# Patient Record
Sex: FEMALE | Race: WHITE | NOT HISPANIC OR LATINO | Employment: UNEMPLOYED | URBAN - METROPOLITAN AREA
[De-identification: names, ages, dates, MRNs, and addresses within clinical notes are randomized per-mention and may not be internally consistent; named-entity substitution may affect disease eponyms.]

---

## 2017-11-14 ENCOUNTER — HOSPITAL ENCOUNTER (OUTPATIENT)
Dept: RADIOLOGY | Facility: HOSPITAL | Age: 52
Discharge: HOME | End: 2017-11-14
Attending: ORTHOPAEDIC SURGERY
Payer: COMMERCIAL

## 2017-11-14 DIAGNOSIS — S83.241A OTH TEAR OF MEDIAL MENISCUS, CURRENT INJURY, R KNEE, INIT: ICD-10-CM

## 2017-11-14 PROCEDURE — 73721 MRI JNT OF LWR EXTRE W/O DYE: CPT | Mod: 26,RT,, | Performed by: RADIOLOGY

## 2017-11-14 PROCEDURE — 73721 MRI JNT OF LWR EXTRE W/O DYE: CPT | Mod: TC,RT

## 2017-12-14 ENCOUNTER — OFFICE VISIT (OUTPATIENT)
Dept: OBSTETRICS AND GYNECOLOGY | Facility: CLINIC | Age: 52
End: 2017-12-14
Payer: COMMERCIAL

## 2017-12-14 VITALS
WEIGHT: 227 LBS | HEART RATE: 88 BPM | RESPIRATION RATE: 10 BRPM | SYSTOLIC BLOOD PRESSURE: 138 MMHG | HEIGHT: 66 IN | DIASTOLIC BLOOD PRESSURE: 90 MMHG | BODY MASS INDEX: 36.48 KG/M2

## 2017-12-14 DIAGNOSIS — N95.1 VAGINAL DRYNESS, MENOPAUSAL: ICD-10-CM

## 2017-12-14 DIAGNOSIS — R23.2 VASOMOTOR FLUSHING: ICD-10-CM

## 2017-12-14 DIAGNOSIS — Z79.890 HORMONE REPLACEMENT THERAPY (HRT): ICD-10-CM

## 2017-12-14 DIAGNOSIS — R68.82 DECREASED LIBIDO: ICD-10-CM

## 2017-12-14 DIAGNOSIS — Z12.39 SCREENING FOR BREAST CANCER: ICD-10-CM

## 2017-12-14 DIAGNOSIS — Z01.419 WELL WOMAN EXAM WITH ROUTINE GYNECOLOGICAL EXAM: Primary | ICD-10-CM

## 2017-12-14 PROCEDURE — 99386 PREV VISIT NEW AGE 40-64: CPT | Mod: S$GLB,,, | Performed by: OBSTETRICS & GYNECOLOGY

## 2017-12-14 PROCEDURE — 99999 PR PBB SHADOW E&M-EST. PATIENT-LVL III: CPT | Mod: PBBFAC,,, | Performed by: OBSTETRICS & GYNECOLOGY

## 2017-12-14 RX ORDER — FUROSEMIDE 20 MG/1
40 TABLET ORAL DAILY
COMMUNITY
Start: 2017-12-12 | End: 2019-07-08 | Stop reason: DRUGHIGH

## 2017-12-14 RX ORDER — CHLORDIAZEPOXIDE HYDROCHLORIDE AND CLIDINIUM BROMIDE 5; 2.5 MG/1; MG/1
1 CAPSULE ORAL NIGHTLY
COMMUNITY
Start: 2017-11-14 | End: 2023-05-24

## 2017-12-14 RX ORDER — ESTRADIOL 0.1 MG/G
1 CREAM VAGINAL DAILY
Qty: 42.5 G | Refills: 6 | Status: SHIPPED | OUTPATIENT
Start: 2017-12-14 | End: 2019-07-08

## 2017-12-14 RX ORDER — ESZOPICLONE 2 MG/1
2 TABLET, FILM COATED ORAL NIGHTLY
COMMUNITY
End: 2020-07-28 | Stop reason: DRUGHIGH

## 2017-12-14 RX ORDER — ESTRADIOL 1 MG/1
1 TABLET ORAL DAILY
Qty: 30 TABLET | Refills: 11 | Status: SHIPPED | OUTPATIENT
Start: 2017-12-14 | End: 2018-12-14

## 2017-12-14 RX ORDER — FENOFIBRATE 160 MG/1
160 TABLET ORAL DAILY
COMMUNITY

## 2017-12-14 RX ORDER — METHOCARBAMOL 500 MG/1
TABLET, FILM COATED ORAL
COMMUNITY
Start: 2017-12-12 | End: 2019-07-08

## 2017-12-14 RX ORDER — LOSARTAN POTASSIUM 50 MG/1
TABLET ORAL
COMMUNITY
Start: 2017-12-12 | End: 2019-07-08

## 2017-12-14 RX ORDER — ESOMEPRAZOLE MAGNESIUM 40 MG/1
40 CAPSULE, DELAYED RELEASE ORAL DAILY
COMMUNITY
Start: 2017-12-12 | End: 2020-01-24

## 2017-12-14 RX ORDER — POTASSIUM CHLORIDE 750 MG/1
10 TABLET, EXTENDED RELEASE ORAL DAILY
COMMUNITY
Start: 2017-12-12

## 2017-12-14 RX ORDER — METOPROLOL TARTRATE 50 MG/1
50 TABLET ORAL 2 TIMES DAILY
COMMUNITY
Start: 2017-12-12

## 2017-12-14 NOTE — PROGRESS NOTES
Subjective:    Patient ID: Shelbie Santamaria is a 52 y.o. y.o. female.     Chief Complaint: Annual Well Woman Exam     History of Present Illness:  Shelbie presents today for Annual Well Woman exam. She describes her menses as absent.She denies pelvic pain.  She denies breast tenderness, masses, nipple discharge. She denies difficulty with urination or bowel movements. She admits to menopausal symptoms such as hotflashes, vaginal dryness, and night sweats. She denies bloating, early satiety, or weight changes. She is not currently sexually active. Contraception is by no method.    Patient had a hysterectomy in the 90s for endometriosis. She reports having vasomotory symptoms since however they are acutely worsening. She reports debilitating hot flashes, night sweats, and mood swings. Previously on HRT however caused headaches. She is interested in restarting. She also report decreased libido and vaginal dryness.     A full discussion of the benefit-risk ratio of hormonal replacement therapy was carried out. Improvement in vasomotor and other climacteric symptoms were discussed, including possible improvements in sleep and mood. Reduction of risk for osteoporosis was explained. We discussed the study data showing increased risk of thrombo-embolic events such as myocardial infarction, stroke and also possibly breast cancer with estrogen replacement, and how this might affect her. The range of side effects such as breast tenderness, weight gain and including possible increases in lifetime risk of breast cancer and possible thrombotic complications was discussed. We also discussed ACOG's recommendation to use hormone replacement therapy for the relief of hot flashes alone and to be on the lowest dose possible for the shortest amount of time.  Alternative such as herbal and soy-based products were reviewed as well as behavioral modifications and non hormonal prescription medications. All of her questions about this  therapy were answered.      Menstrual History:   No LMP recorded. Patient has had a hysterectomy..     OB History      Para Term  AB Living    1 1 1          SAB TAB Ectopic Multiple Live Births                       The following portions of the patient's history were reviewed and updated as appropriate: allergies, current medications, past family history, past medical history, past social history, past surgical history and problem list.        ROS:     Review of Systems   Constitutional: Negative for activity change, appetite change, chills, diaphoresis, fatigue, fever and unexpected weight change.   HENT: Negative for mouth sores and tinnitus.    Eyes: Negative for discharge and visual disturbance.   Respiratory: Negative for cough, shortness of breath and wheezing.    Cardiovascular: Negative for chest pain, palpitations and leg swelling.   Gastrointestinal: Negative for abdominal pain, bloating, blood in stool, constipation, diarrhea, nausea and vomiting.   Endocrine: Positive for hot flashes. Negative for diabetes, hair loss, hyperthyroidism and hypothyroidism.   Genitourinary: Positive for decreased libido and dyspareunia. Negative for dysuria, flank pain, frequency, genital sores, hematuria, menorrhagia, menstrual problem, pelvic pain, urgency, vaginal bleeding, vaginal discharge, vaginal pain, urinary incontinence, postcoital bleeding and vaginal odor.   Musculoskeletal: Negative for back pain, joint swelling and myalgias.   Skin:  Negative for rash and no acne.   Neurological: Negative for seizures, syncope, numbness and headaches.   Hematological: Negative for adenopathy. Does not bruise/bleed easily.   Psychiatric/Behavioral: Negative for depression and sleep disturbance. The patient is not nervous/anxious.    Breast: Negative for breast mass, breast pain, nipple discharge and skin changes      Objective:    Vital Signs:  Vitals:    17 1205   BP: (!) 138/90   Pulse: 88   Resp: 10  "  Weight: 103 kg (227 lb)   Height: 5' 6" (1.676 m)         Physical Exam:  General:  alert, cooperative, appears stated age   Skin:  Skin color, texture, turgor normal. No rashes or lesions   HEENT:  conjunctivae/corneas clear. PERRL.   Neck: supple, trachea midline, no adenopathy or thyromegally   Respiratory:  clear to auscultation bilaterally   Heart:  regular rate and rhythm, S1, S2 normal, no murmur, click, rub or gallop   Breasts:  no discharge, erythema, or tenderness   Abdomen:  normal findings: bowel sounds normal, no masses palpable, no organomegaly and soft, non-tender   Pelvis: External genitalia: normal general appearance  Urinary system: urethral meatus normal, bladder nontender  Vaginal: atrophic mucosa  Cervix: absent, removed surgically  Uterus: absent, removed surgically  Adnexa: non palpable   Extremities: Normal ROM; no edema, no cyanosis   Neurologial: Normal strength and tone. No focal numbness or weakness. Reflexes 2+ and equal.   Psychiatric: normal mood, speech, dress, and thought processes         Assessment:       Healthy female exam.     1. Well woman exam with routine gynecological exam    2. Screening for breast cancer    3. Hormone replacement therapy (HRT)    4. Vaginal dryness, menopausal    5. Vasomotor flushing    6. Decreased libido          Plan:       pap smear deferred    MMG up to date from June 2016  Rx of Estrace 1 mg  Rx of Estrace cream  Discussed weight bearing exercise, calcium, and vitamin d for osteoporosis prevention  Discussed OTC lubricants  RTC in 1-2 months if no improvement        COUNSELING:  Shelbie was counseled on STD pevention, use and side-effects of various contraceptive measures, A.C.O.G. Pap guidelines and recommendations for yearly pelvic exams in addition to recommendations for monthly self breast exams; to see her PCP for other health maintenance.   "

## 2019-07-08 ENCOUNTER — OFFICE VISIT (OUTPATIENT)
Dept: NEUROLOGY | Facility: CLINIC | Age: 54
End: 2019-07-08
Payer: COMMERCIAL

## 2019-07-08 VITALS
SYSTOLIC BLOOD PRESSURE: 142 MMHG | WEIGHT: 255.31 LBS | HEIGHT: 66 IN | BODY MASS INDEX: 41.03 KG/M2 | DIASTOLIC BLOOD PRESSURE: 92 MMHG | HEART RATE: 88 BPM | RESPIRATION RATE: 18 BRPM

## 2019-07-08 DIAGNOSIS — R20.0 NUMBNESS AND TINGLING OF BOTH FEET: Primary | ICD-10-CM

## 2019-07-08 DIAGNOSIS — R20.2 NUMBNESS AND TINGLING OF BOTH FEET: Primary | ICD-10-CM

## 2019-07-08 DIAGNOSIS — I73.9 CLAUDICATION: ICD-10-CM

## 2019-07-08 DIAGNOSIS — Z87.891 FORMER SMOKER: ICD-10-CM

## 2019-07-08 PROCEDURE — 99204 PR OFFICE/OUTPT VISIT, NEW, LEVL IV, 45-59 MIN: ICD-10-PCS | Mod: S$GLB,,, | Performed by: PSYCHIATRY & NEUROLOGY

## 2019-07-08 PROCEDURE — 3008F PR BODY MASS INDEX (BMI) DOCUMENTED: ICD-10-PCS | Mod: CPTII,S$GLB,, | Performed by: PSYCHIATRY & NEUROLOGY

## 2019-07-08 PROCEDURE — 3008F BODY MASS INDEX DOCD: CPT | Mod: CPTII,S$GLB,, | Performed by: PSYCHIATRY & NEUROLOGY

## 2019-07-08 PROCEDURE — 99999 PR PBB SHADOW E&M-EST. PATIENT-LVL III: ICD-10-PCS | Mod: PBBFAC,,, | Performed by: PSYCHIATRY & NEUROLOGY

## 2019-07-08 PROCEDURE — 99204 OFFICE O/P NEW MOD 45 MIN: CPT | Mod: S$GLB,,, | Performed by: PSYCHIATRY & NEUROLOGY

## 2019-07-08 PROCEDURE — 99999 PR PBB SHADOW E&M-EST. PATIENT-LVL III: CPT | Mod: PBBFAC,,, | Performed by: PSYCHIATRY & NEUROLOGY

## 2019-07-08 RX ORDER — METHOCARBAMOL 750 MG/1
750 TABLET, FILM COATED ORAL 4 TIMES DAILY
COMMUNITY
End: 2019-07-25 | Stop reason: SDUPTHER

## 2019-07-08 RX ORDER — AMOXICILLIN 500 MG
1 CAPSULE ORAL 2 TIMES DAILY
COMMUNITY

## 2019-07-08 RX ORDER — FUROSEMIDE 40 MG/1
TABLET ORAL
COMMUNITY

## 2019-07-08 RX ORDER — ACETAMINOPHEN 500 MG
5000 TABLET ORAL 2 TIMES DAILY
COMMUNITY

## 2019-07-08 RX ORDER — ASPIRIN 81 MG/1
81 TABLET ORAL DAILY
COMMUNITY

## 2019-07-08 RX ORDER — BETAMETHASONE DIPROPIONATE 0.5 MG/G
OINTMENT, AUGMENTED TOPICAL
COMMUNITY
End: 2021-10-27

## 2019-07-08 RX ORDER — CETIRIZINE HYDROCHLORIDE 10 MG/1
10 TABLET ORAL DAILY
COMMUNITY

## 2019-07-08 RX ORDER — EZETIMIBE 10 MG/1
10 TABLET ORAL NIGHTLY
Refills: 4 | COMMUNITY
Start: 2019-07-01

## 2019-07-08 NOTE — PROGRESS NOTES
Consult from SEBASTIAN Alexandre    HPI: Shelbie Santamaria is a 54 y.o. female with history of numbness in the feet and pain in the leg  The patient's symptoms started about 6 months ago. She first noted some tingling and numbness in both of her feet (mostly in the toes on the right side, but then all the way to the ankle on the right).  The tingling can occur at rest.  She also has a grabbing and squeezing pain in the left calf mostly with standing/walking.    She has tried biofreeze, different shoes and robaxin. Robaxin helps with some her muscle cramps in the left leg.   The numbness in the toes is mildly burning, but more aggravating than painful currently    She has been on Nexium for years for her GI upset found to be C diff prior. NO history of GI bleeding. No clear ulceration   She has no GERD currently  Patient is a former smoker (quit in 2018)  PCP ordered Venous US legs, BMP, B12, folate, TSH,CBC  She is being treated for lower K per her since those labs    Has been told she has Prediabetes prior by A1C per her    She has seen Dr Kong for back pain prior which later improved (no surgery recommended)  No radicular back pain, no back pain currently, and now pain above the knee    She cares for her son who is a dependent adult as well as her mom.    Review of Systems   Constitutional: Negative for fever.   HENT: Negative for nosebleeds.    Eyes: Negative for double vision.   Respiratory: Negative for hemoptysis.    Cardiovascular: Negative for leg swelling.   Genitourinary: Negative for hematuria.   Musculoskeletal: Negative for back pain.   Skin: Negative for rash.   Neurological: Positive for speech change.   Endo/Heme/Allergies: Does not bruise/bleed easily.         I have reviewed all of this patient's past medical and surgical histories as well as family and social histories and active allergies and medications as documented in the electronic medical record.        Exam:  Gen Appearance, well  developed/nourished in no apparent distress  CV: 2+ distal pulses with no edema or swelling  Neuro:  MS: Awake, alert, oriented to place, person, time, situation. Sustains attention. Recent/remote memory intact, Language is full to spontaneous speech/repetition/naming/comprehension. Fund of Knowledge is full  CN: Optic discs are flat with normal vasculature, PERRL, Extraoccular movements and visual fields are full. Normal facial sensation and strength, Hearing symmetric, Tongue and Palate are midline and strong. Shoulder Shrug symmetric and strong.  Motor: Normal bulk, tone, no abnormal movements. 5/5 strength bilateral upper/lower extremities with 1+ reflexes and bilateral plantar response  Sensory: symmetric to light touch, pain, temp, and vibration except reduced in the feet to all modalities  Romberg negative  Cerebellar: Finger-nose,Heal-shin, Rapid alternating movements intact  Gait: Normal stance, no ataxia      Assessment/Plan: Shelbie Sanatmaria is a 54 y.o. female with 6 months of numbness in the toes on the right sided and numbness in the feet to the ankle on the left side with some cramping in the left calf mostly with standing/walking  I recommend:   1. EMG/NCS of the legs  2. ABIs  3. PCP ordered Venous US legs, BMP, B12, folate, TSH,CBC- will request these results. She is being treated for lower K per her since those labs  -Has been told she has Prediabetes prior by A1C per her which could contribute to neuropathy  4. Patient is a former smoker-quit in 2018  5. Robaxin helps with some her muscle cramps in the left leg.   6. Nexium use noted for prior C. Diff infection per her. These meds have been possibly implicated in Neuropathy. D/C Nexium and use Zantac if able to see if any tingling symptoms improve.   -Will consider gabapentin trial for symptoms going forward depending on the above   RTC for EMG/NCS  CC: SEBASTIAN Alexandre

## 2019-07-08 NOTE — LETTER
July 8, 2019      Sunny Alexandre NP  [address]  [address]  [place]  [place] [address]           [facility]  [address] [facility] [address]  Phone: [phone]  Fax: [phone]          Patient: Shelbie Santamaria   MR Number: [id number]   YOB: 1965   Date of Visit: 7/8/2019       Dear Sunny Alexandre:    Thank you for referring Shelbie Santamaria to me for evaluation. Attached you will find relevant portions of my assessment and plan of care.    If you have questions, please do not hesitate to call me. I look forward to following Shelbie Santamaria along with you.    Sincerely,    Manuel Gray MD    Enclosure  CC:  No Recipients    If you would like to receive this communication electronically, please contact [email or web address] or [phone] to request more information on [facility] access.    For providers and/or their staff who would like to refer a patient to [facility], please contact us through our one-stop-shop provider referral line, [facility], at [phone].    If you feel you have received this communication in error or would no longer like to receive these types of communications, please e-mail [email or web address]

## 2019-07-10 ENCOUNTER — HOSPITAL ENCOUNTER (OUTPATIENT)
Dept: PULMONOLOGY | Facility: HOSPITAL | Age: 54
Discharge: HOME | End: 2019-07-10
Attending: PSYCHIATRY & NEUROLOGY
Payer: COMMERCIAL

## 2019-07-10 DIAGNOSIS — Z87.891 FORMER SMOKER: ICD-10-CM

## 2019-07-10 DIAGNOSIS — I73.9 CLAUDICATION: ICD-10-CM

## 2019-07-10 LAB
LEFT ABI: 1.24
LEFT ARM BP: 187 MMHG
LEFT DORSALIS PEDIS: 195 MMHG
LEFT POSTERIOR TIBIAL: 232 MMHG
LEFT TBI: 0.75
LEFT TOE PRESSURE: 140 MMHG
RIGHT ABI: 1.18
RIGHT ARM BP: 180 MMHG
RIGHT DORSALIS PEDIS: 220 MMHG
RIGHT POSTERIOR TIBIAL: 205 MMHG
RIGHT TBI: 1.07
RIGHT TOE PRESSURE: 200 MMHG

## 2019-07-10 PROCEDURE — 93922 UPR/L XTREMITY ART 2 LEVELS: CPT | Mod: 50

## 2019-07-10 PROCEDURE — 93922 UPR/L XTREMITY ART 2 LEVELS: CPT | Mod: 26,,, | Performed by: INTERNAL MEDICINE

## 2019-07-10 PROCEDURE — 93922 CV US ANKLE BRACHIAL INDICES WO STRESS W TOE TRACINGS (CUPID ONLY): ICD-10-PCS | Mod: 26,,, | Performed by: INTERNAL MEDICINE

## 2019-07-25 ENCOUNTER — PROCEDURE VISIT (OUTPATIENT)
Dept: NEUROLOGY | Facility: CLINIC | Age: 54
End: 2019-07-25
Payer: COMMERCIAL

## 2019-07-25 DIAGNOSIS — Z87.891 FORMER SMOKER: ICD-10-CM

## 2019-07-25 DIAGNOSIS — G62.9 POLYNEUROPATHY: Primary | ICD-10-CM

## 2019-07-25 DIAGNOSIS — R20.0 NUMBNESS AND TINGLING OF BOTH FEET: ICD-10-CM

## 2019-07-25 DIAGNOSIS — R20.2 NUMBNESS AND TINGLING OF BOTH FEET: ICD-10-CM

## 2019-07-25 PROCEDURE — 95910 NRV CNDJ TEST 7-8 STUDIES: CPT | Mod: S$GLB,,, | Performed by: PSYCHIATRY & NEUROLOGY

## 2019-07-25 PROCEDURE — 95886 MUSC TEST DONE W/N TEST COMP: CPT | Mod: S$GLB,,, | Performed by: PSYCHIATRY & NEUROLOGY

## 2019-07-25 PROCEDURE — 95910 PR NERVE CONDUCTION STUDY; 7-8 STUDIES: ICD-10-PCS | Mod: S$GLB,,, | Performed by: PSYCHIATRY & NEUROLOGY

## 2019-07-25 PROCEDURE — 95886 PR EMG COMPLETE, W/ NERVE CONDUCTION STUDIES, 5+ MUSCLES: ICD-10-PCS | Mod: S$GLB,,, | Performed by: PSYCHIATRY & NEUROLOGY

## 2019-07-25 RX ORDER — METHOCARBAMOL 750 MG/1
750 TABLET, FILM COATED ORAL 3 TIMES DAILY
Qty: 90 TABLET | Refills: 5 | Status: SHIPPED | OUTPATIENT
Start: 2019-07-25 | End: 2020-01-24 | Stop reason: SDUPTHER

## 2019-07-25 NOTE — PROCEDURES
EMG W/ ULTRASOUND AND NERVE CONDUCTION TEST 2 Extremities  Date/Time: 7/25/2019 3:18 PM  Performed by: Manuel Gray MD  Authorized by: Manuel Gray MD       REPORT OF EMG and NERVE CONDUCTION STUDY    Name: Shelbie Santamaria  Date of Study:  7/25/19  Referring Physician:  Marina  Test Performed by:  MD Marina  Full Values Attached  Informed Consent Scanned.   No anesthesia used.   Amount of Blood Loss: none. The patient tolerated this procedure well.       Informed consent was obtained prior to performing this study. Two patient identifiers were confirmed with the patient prior to performing this study. A time out to determine correct patient and and agreement on procedure performed was conducted prior to the concentric needle examination.    Reason for the study:  Numb feet      Findings:   Nerve conduction studies of the  bilateral peroneal motor, bilateral tibial motor, bilateral sural nerves and bilateral H-reflexes were performed.   Amplitudes were mildly reduced in some motor CMAPs and both sural snaps, butdistal latencies, conduction velocities, and F-waves were normal. H reflexes were absent  EMG of selected muscles of the bilateral legs were performed as indicated on the attached sheets.  Insertional activity was normal without fasciculation or fibrillation, normal sized and phasia of motor units.      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 [facility].     Recommendations (discussed at this visit):   1. Her symptoms are mildly improved with stopping Nexium to date and she will continue off of this med.   2. ABIs unremarkable and she states Venous US with PCP was negative (that result was never sent)  3. Labs from PCP including B12, Folate, TSH, CBC were all reviewed. Fasting glucose normal at times and 126 at others. This is the likely cause of her mild  neuropathy. Discussed diet with goal 10 pound weight loss in 6 months (lower carb). Repeat A1C at that time and will add SPEP/JESSE if symptoms are not resolved.   4. She declines gabapentin trial as she mostly has numbness since Robaxin has helped her leg cramping. Will continue her Robaxin.   RTC 6 months

## 2020-01-24 ENCOUNTER — OFFICE VISIT (OUTPATIENT)
Dept: NEUROLOGY | Facility: CLINIC | Age: 55
End: 2020-01-24
Payer: COMMERCIAL

## 2020-01-24 VITALS
SYSTOLIC BLOOD PRESSURE: 130 MMHG | DIASTOLIC BLOOD PRESSURE: 92 MMHG | HEART RATE: 73 BPM | WEIGHT: 251.31 LBS | HEIGHT: 66 IN | RESPIRATION RATE: 20 BRPM | BODY MASS INDEX: 40.39 KG/M2

## 2020-01-24 DIAGNOSIS — R25.2 LEG CRAMPING: ICD-10-CM

## 2020-01-24 DIAGNOSIS — R73.03 PREDIABETES: ICD-10-CM

## 2020-01-24 DIAGNOSIS — E78.5 HYPERLIPIDEMIA, UNSPECIFIED HYPERLIPIDEMIA TYPE: ICD-10-CM

## 2020-01-24 DIAGNOSIS — G62.9 POLYNEUROPATHY: Primary | ICD-10-CM

## 2020-01-24 DIAGNOSIS — I10 HYPERTENSION, UNSPECIFIED TYPE: ICD-10-CM

## 2020-01-24 PROCEDURE — 3080F PR MOST RECENT DIASTOLIC BLOOD PRESSURE >= 90 MM HG: ICD-10-PCS | Mod: CPTII,S$GLB,, | Performed by: NURSE PRACTITIONER

## 2020-01-24 PROCEDURE — 3008F PR BODY MASS INDEX (BMI) DOCUMENTED: ICD-10-PCS | Mod: CPTII,S$GLB,, | Performed by: NURSE PRACTITIONER

## 2020-01-24 PROCEDURE — 99214 OFFICE O/P EST MOD 30 MIN: CPT | Mod: S$GLB,,, | Performed by: NURSE PRACTITIONER

## 2020-01-24 PROCEDURE — 3008F BODY MASS INDEX DOCD: CPT | Mod: CPTII,S$GLB,, | Performed by: NURSE PRACTITIONER

## 2020-01-24 PROCEDURE — 3075F SYST BP GE 130 - 139MM HG: CPT | Mod: CPTII,S$GLB,, | Performed by: NURSE PRACTITIONER

## 2020-01-24 PROCEDURE — 3080F DIAST BP >= 90 MM HG: CPT | Mod: CPTII,S$GLB,, | Performed by: NURSE PRACTITIONER

## 2020-01-24 PROCEDURE — 99214 PR OFFICE/OUTPT VISIT, EST, LEVL IV, 30-39 MIN: ICD-10-PCS | Mod: S$GLB,,, | Performed by: NURSE PRACTITIONER

## 2020-01-24 PROCEDURE — 99999 PR PBB SHADOW E&M-EST. PATIENT-LVL IV: CPT | Mod: PBBFAC,,, | Performed by: NURSE PRACTITIONER

## 2020-01-24 PROCEDURE — 3075F PR MOST RECENT SYSTOLIC BLOOD PRESS GE 130-139MM HG: ICD-10-PCS | Mod: CPTII,S$GLB,, | Performed by: NURSE PRACTITIONER

## 2020-01-24 PROCEDURE — 99999 PR PBB SHADOW E&M-EST. PATIENT-LVL IV: ICD-10-PCS | Mod: PBBFAC,,, | Performed by: NURSE PRACTITIONER

## 2020-01-24 RX ORDER — METHOCARBAMOL 750 MG/1
750 TABLET, FILM COATED ORAL 3 TIMES DAILY
Qty: 90 TABLET | Refills: 5 | Status: SHIPPED | OUTPATIENT
Start: 2020-01-24 | End: 2020-07-28 | Stop reason: SDUPTHER

## 2020-01-24 NOTE — PROGRESS NOTES
HPI: Shelbie Santamaria is a 55 y.o. female with history of numbness in the feet and pain in the leg with polyneuropathy noted on EMG BLE 2019. History of borderline DM, as well as HTN, HLD, and insomnia.     She presents today for a follow up visit. Her leg cramping continues to be helped with prn Robaxin.     She continues with pain and numbness to her feet. This is tolerable, and she continues to decline a trial of Gabapentin. She tripped over something on the floor and fell once with some increased soreness to her leg, which has resolved.     She has a headache today. This occurs intermittently from stress, weather changes, odors, smoke exposure. Long history of migraine.     She cares for her son who is a dependent adult as well as her mom.    She has a rash to her hands, which occurs with water exposure. She has seen Dermatology and has taken Prednisone intermittently, which is helpful. She is asking if I can prescribe her something for this.     Review of Systems   Constitutional: Negative for fever.   HENT: Negative for nosebleeds.    Eyes: Negative for double vision.   Respiratory: Negative for hemoptysis.    Cardiovascular: Negative for leg swelling.   Genitourinary: Negative for hematuria.   Musculoskeletal: Negative for back pain.   Skin: Positive for rash.   Neurological: Positive for speech change and headaches.   Endo/Heme/Allergies: Does not bruise/bleed easily.       I have reviewed all of this patient's past medical and surgical histories as well as family and social histories and active allergies and medications as documented in the electronic medical record.    Exam:  Gen Appearance, well developed/nourished in no apparent distress  CV: 2+ distal pulses with no edema or swelling  Neuro:  MS: Awake, alert, oriented to place, person, time, situation. Sustains attention. Recent/remote memory intact, Language is full to spontaneous speech/repetition/naming/comprehension. Fund of Knowledge is  full  CN: Optic discs are flat with normal vasculature, PERRL, Extraoccular movements and visual fields are full. Normal facial sensation and strength, Hearing symmetric, Tongue and Palate are midline and strong. Shoulder Shrug symmetric and strong.  Motor: Normal bulk, tone, no abnormal movements. 5/5 strength bilateral upper/lower extremities with 1+ reflexes and bilateral plantar response  Sensory: symmetric to light touch, pain, temp, and vibration except reduced in the feet to all modalities  Romberg negative  Cerebellar: Finger-nose,Heal-shin, Rapid alternating movements intact  Gait: Normal stance, no ataxia  Skin: severe dermatitis noted to hands bilaterally    Assessment/Plan: Shelbie Santamaria is a 55 y.o. female with history of numbness in the feet and pain in the leg with polyneuropathy noted on EMG BLE 2019. History of borderline DM, as well as HTN, HLD, and insomnia.     1. Her symptoms are mildly improved with stopping Nexium to date and she will continue off of this med. Nexium use noted for prior C. Diff infection per her. These meds have been possibly implicated in Neuropathy.   2. ABIs unremarkable and she states Venous US with PCP was negative (that result was never sent)  3. Labs from PCP including B12, Folate, TSH, CBC were all reviewed. Fasting glucose normal at times and 126 at others. This is the likely cause of her mild neuropathy. Discussed diet with goal 10 pound weight loss in 6 months (lower carb). Repeat A1C at that time and will add SPEP/JESSE if symptoms are not resolved.   4. She declines gabapentin trial as she mostly has numbness since Robaxin has helped her leg cramping. Will continue her Robaxin.   5. Patient is a former smoker-quit in 2018  6. Robaxin helps with some her muscle cramps in the left leg. Continue this medication.   7. Episodic headache noted. No preventative treatment needed.   8. Former tobacco use.   9. Advised to see Dermatology for her chronic severe  bilateral hand dermatitis.     FU 6 months

## 2020-07-28 ENCOUNTER — OFFICE VISIT (OUTPATIENT)
Dept: NEUROLOGY | Facility: CLINIC | Age: 55
End: 2020-07-28
Payer: COMMERCIAL

## 2020-07-28 VITALS
SYSTOLIC BLOOD PRESSURE: 146 MMHG | HEIGHT: 66 IN | DIASTOLIC BLOOD PRESSURE: 82 MMHG | WEIGHT: 235.69 LBS | RESPIRATION RATE: 16 BRPM | HEART RATE: 76 BPM | TEMPERATURE: 98 F | BODY MASS INDEX: 37.88 KG/M2

## 2020-07-28 DIAGNOSIS — G62.9 POLYNEUROPATHY: Primary | ICD-10-CM

## 2020-07-28 DIAGNOSIS — R73.03 PREDIABETES: ICD-10-CM

## 2020-07-28 DIAGNOSIS — R25.2 LEG CRAMPING: ICD-10-CM

## 2020-07-28 DIAGNOSIS — I10 HYPERTENSION, UNSPECIFIED TYPE: ICD-10-CM

## 2020-07-28 DIAGNOSIS — E78.5 HYPERLIPIDEMIA, UNSPECIFIED HYPERLIPIDEMIA TYPE: ICD-10-CM

## 2020-07-28 PROCEDURE — 3079F PR MOST RECENT DIASTOLIC BLOOD PRESSURE 80-89 MM HG: ICD-10-PCS | Mod: CPTII,S$GLB,, | Performed by: NURSE PRACTITIONER

## 2020-07-28 PROCEDURE — 99999 PR PBB SHADOW E&M-EST. PATIENT-LVL IV: CPT | Mod: PBBFAC,,, | Performed by: NURSE PRACTITIONER

## 2020-07-28 PROCEDURE — 3079F DIAST BP 80-89 MM HG: CPT | Mod: CPTII,S$GLB,, | Performed by: NURSE PRACTITIONER

## 2020-07-28 PROCEDURE — 99999 PR PBB SHADOW E&M-EST. PATIENT-LVL IV: ICD-10-PCS | Mod: PBBFAC,,, | Performed by: NURSE PRACTITIONER

## 2020-07-28 PROCEDURE — 99214 OFFICE O/P EST MOD 30 MIN: CPT | Mod: S$GLB,,, | Performed by: NURSE PRACTITIONER

## 2020-07-28 PROCEDURE — 3008F BODY MASS INDEX DOCD: CPT | Mod: CPTII,S$GLB,, | Performed by: NURSE PRACTITIONER

## 2020-07-28 PROCEDURE — 99214 PR OFFICE/OUTPT VISIT, EST, LEVL IV, 30-39 MIN: ICD-10-PCS | Mod: S$GLB,,, | Performed by: NURSE PRACTITIONER

## 2020-07-28 PROCEDURE — 3008F PR BODY MASS INDEX (BMI) DOCUMENTED: ICD-10-PCS | Mod: CPTII,S$GLB,, | Performed by: NURSE PRACTITIONER

## 2020-07-28 PROCEDURE — 3077F PR MOST RECENT SYSTOLIC BLOOD PRESSURE >= 140 MM HG: ICD-10-PCS | Mod: CPTII,S$GLB,, | Performed by: NURSE PRACTITIONER

## 2020-07-28 PROCEDURE — 3077F SYST BP >= 140 MM HG: CPT | Mod: CPTII,S$GLB,, | Performed by: NURSE PRACTITIONER

## 2020-07-28 RX ORDER — ESZOPICLONE 3 MG/1
3 TABLET, FILM COATED ORAL NIGHTLY
COMMUNITY
End: 2023-05-24

## 2020-07-28 RX ORDER — GABAPENTIN 100 MG/1
100 CAPSULE ORAL NIGHTLY
Qty: 90 CAPSULE | Refills: 1 | Status: SHIPPED | OUTPATIENT
Start: 2020-07-28 | End: 2020-09-29 | Stop reason: ALTCHOICE

## 2020-07-28 RX ORDER — METHOCARBAMOL 750 MG/1
750 TABLET, FILM COATED ORAL 3 TIMES DAILY
Qty: 270 TABLET | Refills: 1 | Status: SHIPPED | OUTPATIENT
Start: 2020-07-28 | End: 2020-12-23 | Stop reason: SDUPTHER

## 2020-07-28 NOTE — PATIENT INSTRUCTIONS
[email or web address]    Make an account and you can send me a message on the portal. Update me in 2 weeks on response to Gabapentin at night.

## 2020-07-28 NOTE — PROGRESS NOTES
HPI: Shelbie Santamaria is a 55 y.o. female with history of numbness in the feet and pain in the leg with polyneuropathy noted on EMG BLE 2019. History of borderline DM, as well as HTN, HLD, and insomnia.     She presents today for a follow up visit. Her mother passed away recently from pneumonia.     Her leg cramping continues to be helped with prn Robaxin.     She continues with pain and numbness to her feet. She would like to try Gabapentin at night, when her symptoms are bothersome.     She lost 16 pounds since her last visit.     Headaches occur intermittently from stress, weather changes, odors, smoke exposure. Long history of migraine. She has a mentally handicapped son, who has seizures.     She sees Dermatology for dyshidrotic eczema to her hands, which is improved since her last visit.      Review of Systems   Constitutional: Negative for fever.   HENT: Negative for nosebleeds.    Eyes: Negative for double vision.   Respiratory: Negative for hemoptysis.    Cardiovascular: Negative for leg swelling.   Genitourinary: Negative for hematuria.   Musculoskeletal: Negative for back pain.   Skin: Positive for rash.   Neurological: Positive for speech change and headaches.   Endo/Heme/Allergies: Does not bruise/bleed easily.       I have reviewed all of this patient's past medical and surgical histories as well as family and social histories and active allergies and medications as documented in the electronic medical record.    Exam:  Gen Appearance, well developed/nourished in no apparent distress  CV: 2+ distal pulses with no edema or swelling  Neuro:  MS: Awake, alert, oriented to place, person, time, situation. Sustains attention. Recent/remote memory intact, Language is full to spontaneous speech/repetition/naming/comprehension. Fund of Knowledge is full  CN: Optic discs are flat with normal vasculature, PERRL, Extraoccular movements and visual fields are full. Normal facial sensation and strength,  Hearing symmetric, Tongue and Palate are midline and strong. Shoulder Shrug symmetric and strong.  Motor: Normal bulk, tone, no abnormal movements. 5/5 strength bilateral upper/lower extremities with 1+ reflexes and bilateral plantar response  Sensory: symmetric to light touch, pain, temp, and vibration except reduced in the feet to all modalities  Romberg negative  Cerebellar: Finger-nose,Heal-shin, Rapid alternating movements intact  Gait: Normal stance, no ataxia  Skin: severe dermatitis noted to hands bilaterally    Assessment/Plan: Shelbie Santamaria is a 55 y.o. female with history of numbness in the feet and pain in the leg with polyneuropathy noted on EMG BLE 2019. History of borderline DM, as well as HTN, HLD, and insomnia.     1. Her symptoms are mildly improved with stopping Nexium to date and she will continue off of this med. Nexium use noted for prior C. Diff infection per her. These meds have been possibly implicated in Neuropathy.   2. ABIs unremarkable and she states Venous US with PCP was negative (that result was never sent)  3. Labs from PCP including B12, Folate, TSH, CBC were all reviewed. Fasting glucose normal at times and 126 at others. This is the likely cause of her mild neuropathy. Discussed diet with goal 10 pound weight loss in 6 months (lower carb). Repeat A1C at that time and will add SPEP/JESSE if symptoms are not resolved.   4. Start Gabapentin trial 100 mg qhs for neuropathic complaints.  This may help her to rest to where she no longer requires Lunesta.   -Robaxin has helped her leg cramping. Will continue her Robaxin.   5. Patient is a former smoker-quit in 2018  6. Robaxin helps with some her muscle cramps in the left leg. Continue this medication.   7. Episodic headache noted. No preventative treatment needed.   8. Former tobacco use.   9. Advised to see Dermatology for her chronic severe bilateral hand dermatitis.     FU 6 months

## 2020-09-29 ENCOUNTER — OFFICE VISIT (OUTPATIENT)
Dept: NEUROLOGY | Facility: CLINIC | Age: 55
End: 2020-09-29
Payer: COMMERCIAL

## 2020-09-29 VITALS
HEART RATE: 68 BPM | TEMPERATURE: 97 F | DIASTOLIC BLOOD PRESSURE: 86 MMHG | WEIGHT: 234.56 LBS | BODY MASS INDEX: 37.69 KG/M2 | RESPIRATION RATE: 18 BRPM | HEIGHT: 66 IN | SYSTOLIC BLOOD PRESSURE: 148 MMHG

## 2020-09-29 DIAGNOSIS — G62.9 POLYNEUROPATHY: Primary | ICD-10-CM

## 2020-09-29 DIAGNOSIS — R73.03 PREDIABETES: ICD-10-CM

## 2020-09-29 PROCEDURE — 3008F PR BODY MASS INDEX (BMI) DOCUMENTED: ICD-10-PCS | Mod: CPTII,S$GLB,, | Performed by: NURSE PRACTITIONER

## 2020-09-29 PROCEDURE — 99214 PR OFFICE/OUTPT VISIT, EST, LEVL IV, 30-39 MIN: ICD-10-PCS | Mod: S$GLB,,, | Performed by: NURSE PRACTITIONER

## 2020-09-29 PROCEDURE — 99999 PR PBB SHADOW E&M-EST. PATIENT-LVL IV: ICD-10-PCS | Mod: PBBFAC,,, | Performed by: NURSE PRACTITIONER

## 2020-09-29 PROCEDURE — 3008F BODY MASS INDEX DOCD: CPT | Mod: CPTII,S$GLB,, | Performed by: NURSE PRACTITIONER

## 2020-09-29 PROCEDURE — 3077F SYST BP >= 140 MM HG: CPT | Mod: CPTII,S$GLB,, | Performed by: NURSE PRACTITIONER

## 2020-09-29 PROCEDURE — 3079F PR MOST RECENT DIASTOLIC BLOOD PRESSURE 80-89 MM HG: ICD-10-PCS | Mod: CPTII,S$GLB,, | Performed by: NURSE PRACTITIONER

## 2020-09-29 PROCEDURE — 3079F DIAST BP 80-89 MM HG: CPT | Mod: CPTII,S$GLB,, | Performed by: NURSE PRACTITIONER

## 2020-09-29 PROCEDURE — 99214 OFFICE O/P EST MOD 30 MIN: CPT | Mod: S$GLB,,, | Performed by: NURSE PRACTITIONER

## 2020-09-29 PROCEDURE — 99999 PR PBB SHADOW E&M-EST. PATIENT-LVL IV: CPT | Mod: PBBFAC,,, | Performed by: NURSE PRACTITIONER

## 2020-09-29 PROCEDURE — 3077F PR MOST RECENT SYSTOLIC BLOOD PRESSURE >= 140 MM HG: ICD-10-PCS | Mod: CPTII,S$GLB,, | Performed by: NURSE PRACTITIONER

## 2020-09-29 RX ORDER — DULOXETIN HYDROCHLORIDE 30 MG/1
30 CAPSULE, DELAYED RELEASE ORAL DAILY
Qty: 30 CAPSULE | Refills: 3 | Status: SHIPPED | OUTPATIENT
Start: 2020-09-29 | End: 2020-12-23 | Stop reason: SDUPTHER

## 2020-09-29 NOTE — PATIENT INSTRUCTIONS
If prescription pain cream is not covered or is too expensive, you may try Blue Emu with Lidocaine for your foot pain.

## 2020-09-29 NOTE — PROGRESS NOTES
HPI: Shelbie Santamaria is a 55 y.o. female with history of numbness in the feet and pain in the leg with polyneuropathy noted on EMG BLE 2019. History of borderline DM, as well as HTN, HLD, and insomnia.     She presents today for a follow up visit. Sge was placed on Gabapentin qhs for neuropathic complaints, which initially caused some sadness, then this resolved. This helped the pain, but not her tingling or numbness.     She doesn't tolerate antidepressants well. She took Prozac or Elavil prior, and felt like a zombie. She took Paxil prior for headaches, which was ineffective. She had weight gain with Effexor prior.     She has been active cleaning out her mother's belongings, after she passed away.       Her leg cramping continues to be helped with prn Robaxin.     She lost 1 pound since her last visit.     Headaches occur intermittently from stress, weather changes, odors, smoke exposure. Long history of migraine. She has a mentally handicapped son, who has seizures.     She sees Dermatology for dyshidrotic eczema to her hands, which is improved since her last visit.      Review of Systems   Constitutional: Negative for fever.   HENT: Negative for nosebleeds.    Eyes: Negative for double vision.   Respiratory: Negative for hemoptysis.    Cardiovascular: Negative for leg swelling.   Genitourinary: Negative for hematuria.   Musculoskeletal: Negative for back pain.   Skin: Positive for rash.   Neurological: Positive for speech change and headaches.   Endo/Heme/Allergies: Does not bruise/bleed easily.       I have reviewed all of this patient's past medical and surgical histories as well as family and social histories and active allergies and medications as documented in the electronic medical record.    Exam:  Gen Appearance, well developed/nourished in no apparent distress  CV: 2+ distal pulses with no edema or swelling  Neuro:  MS: Awake, alert, oriented to place, person, time, situation. Sustains  attention. Recent/remote memory intact, Language is full to spontaneous speech/repetition/naming/comprehension. Fund of Knowledge is full  CN: Optic discs are flat with normal vasculature, PERRL, Extraoccular movements and visual fields are full. Normal facial sensation and strength, Hearing symmetric, Tongue and Palate are midline and strong. Shoulder Shrug symmetric and strong.  Motor: Normal bulk, tone, no abnormal movements. 5/5 strength bilateral upper/lower extremities with 1+ reflexes and bilateral plantar response  Sensory: symmetric to light touch, pain, temp, and vibration except reduced in the feet to all modalities  Romberg negative  Cerebellar: Finger-nose,Heal-shin, Rapid alternating movements intact  Gait: Normal stance, no ataxia  Skin: severe dermatitis noted to hands bilaterally    Assessment/Plan: Shelbie Santamaria is a 55 y.o. female with history of numbness in the feet and pain in the leg with polyneuropathy noted on EMG BLE 2019. History of borderline DM, as well as HTN, HLD, and insomnia.     I recommend;   1. Stop Gabapentin, as this provided mild relief, but resulted in depression for a time-would not increase further.   2. Start Cymbalta for neuropathy. Monitor for mood changes/side effects.   3. Start Rx compound cream for foot pain.   -could consider Lyrica, Topamax, Alpha Lipoic acid, or L-Glutamine for neuropathy should she fail Cymbalta or not tolerate this.   4. Consider repeat HgA1c and SPEP/JESSE should her complaints not be improved at next visit.   5. Her neuropathic symptoms are mildly improved with stopping Nexium to date and she will continue off of this med. Nexium use noted for prior C. Diff infection per her. These meds have been possibly implicated in Neuropathy.   6. ABIs unremarkable and she states Venous US with PCP was negative (that result was never sent)  7. Labs from PCP including B12, Folate, TSH, CBC were all reviewed. Fasting glucose normal at times and 126 at  others. This is the likely cause of her mild neuropathy. Discussed diet with goal 10 pound weight loss in 6 months (lower carb). Repeat A1C at that time and will add SPEP/JESSE if symptoms are not resolved.   8. Patient is a former smoker-quit in 2018  9. Robaxin helps with some her muscle cramps in the left leg. Continue this medication.   10. Episodic headache noted. No preventative treatment needed.   11. Former tobacco use.   12. Advised to see Dermatology for her chronic severe bilateral hand dermatitis.     FU 8 weeks

## 2020-12-23 ENCOUNTER — OFFICE VISIT (OUTPATIENT)
Dept: NEUROLOGY | Facility: CLINIC | Age: 55
End: 2020-12-23
Payer: COMMERCIAL

## 2020-12-23 VITALS
SYSTOLIC BLOOD PRESSURE: 136 MMHG | HEIGHT: 66 IN | DIASTOLIC BLOOD PRESSURE: 88 MMHG | WEIGHT: 233.56 LBS | BODY MASS INDEX: 37.54 KG/M2 | RESPIRATION RATE: 18 BRPM | TEMPERATURE: 97 F | HEART RATE: 72 BPM

## 2020-12-23 DIAGNOSIS — G62.9 POLYNEUROPATHY: Primary | ICD-10-CM

## 2020-12-23 DIAGNOSIS — R25.2 LEG CRAMPING: ICD-10-CM

## 2020-12-23 PROCEDURE — 3008F BODY MASS INDEX DOCD: CPT | Mod: CPTII,S$GLB,, | Performed by: NURSE PRACTITIONER

## 2020-12-23 PROCEDURE — 1125F AMNT PAIN NOTED PAIN PRSNT: CPT | Mod: S$GLB,,, | Performed by: NURSE PRACTITIONER

## 2020-12-23 PROCEDURE — 99214 PR OFFICE/OUTPT VISIT, EST, LEVL IV, 30-39 MIN: ICD-10-PCS | Mod: S$GLB,,, | Performed by: NURSE PRACTITIONER

## 2020-12-23 PROCEDURE — 3075F SYST BP GE 130 - 139MM HG: CPT | Mod: CPTII,S$GLB,, | Performed by: NURSE PRACTITIONER

## 2020-12-23 PROCEDURE — 99999 PR PBB SHADOW E&M-EST. PATIENT-LVL IV: ICD-10-PCS | Mod: PBBFAC,,, | Performed by: NURSE PRACTITIONER

## 2020-12-23 PROCEDURE — 3075F PR MOST RECENT SYSTOLIC BLOOD PRESS GE 130-139MM HG: ICD-10-PCS | Mod: CPTII,S$GLB,, | Performed by: NURSE PRACTITIONER

## 2020-12-23 PROCEDURE — 99214 OFFICE O/P EST MOD 30 MIN: CPT | Mod: S$GLB,,, | Performed by: NURSE PRACTITIONER

## 2020-12-23 PROCEDURE — 3079F PR MOST RECENT DIASTOLIC BLOOD PRESSURE 80-89 MM HG: ICD-10-PCS | Mod: CPTII,S$GLB,, | Performed by: NURSE PRACTITIONER

## 2020-12-23 PROCEDURE — 3008F PR BODY MASS INDEX (BMI) DOCUMENTED: ICD-10-PCS | Mod: CPTII,S$GLB,, | Performed by: NURSE PRACTITIONER

## 2020-12-23 PROCEDURE — 99999 PR PBB SHADOW E&M-EST. PATIENT-LVL IV: CPT | Mod: PBBFAC,,, | Performed by: NURSE PRACTITIONER

## 2020-12-23 PROCEDURE — 1125F PR PAIN SEVERITY QUANTIFIED, PAIN PRESENT: ICD-10-PCS | Mod: S$GLB,,, | Performed by: NURSE PRACTITIONER

## 2020-12-23 PROCEDURE — 3079F DIAST BP 80-89 MM HG: CPT | Mod: CPTII,S$GLB,, | Performed by: NURSE PRACTITIONER

## 2020-12-23 RX ORDER — DULOXETIN HYDROCHLORIDE 30 MG/1
30 CAPSULE, DELAYED RELEASE ORAL 2 TIMES DAILY
Qty: 60 CAPSULE | Refills: 3 | Status: SHIPPED | OUTPATIENT
Start: 2020-12-23 | End: 2021-04-29 | Stop reason: SDUPTHER

## 2020-12-23 RX ORDER — KETOCONAZOLE 20 MG/G
1 CREAM TOPICAL DAILY PRN
COMMUNITY
Start: 2020-11-10

## 2020-12-23 RX ORDER — FAMOTIDINE 10 MG/1
10 TABLET ORAL 2 TIMES DAILY
COMMUNITY
End: 2022-06-14

## 2020-12-23 RX ORDER — METHOCARBAMOL 750 MG/1
750 TABLET, FILM COATED ORAL 3 TIMES DAILY
Qty: 270 TABLET | Refills: 1 | Status: SHIPPED | OUTPATIENT
Start: 2020-12-23 | End: 2021-04-29 | Stop reason: SDUPTHER

## 2020-12-23 RX ORDER — TRIAMCINOLONE ACETONIDE 1 MG/G
CREAM TOPICAL
COMMUNITY
Start: 2020-11-10 | End: 2023-09-05

## 2020-12-23 NOTE — PROGRESS NOTES
HPI: Shelbie Santamaria is a 55 y.o. female with history of numbness in the feet and pain in the leg with polyneuropathy noted on EMG BLE 2019. History of borderline DM, as well as HTN, HLD, and insomnia.     She presents today for a follow up visit. At her last visit Gabapentin was stopped, as this was not overall effective for her neuropathic complaints. Cymbalta was started for neuropathy pain, which was very effective. She continues with some neuropathic complaints, and she would like to increase her Cymbalta further. She is tolerating this well. No fatigue.     Compound cream Rx given at last visit aggravated her hand eczema. She was unable to use.     Cymbalta has had a positive effect on her mood. Stress eating has resolved since starting this.     She started Pepcid for GERD. Nexium caused worsening foot pain prior.     Her mother passed away earlier this year.     Her leg cramping continues to be helped with prn Robaxin.     No weight gain since starting Cymbalta. She lost 1 pound.     Episodic headaches with weather changes, odors, smoke exposures, and stress, but this is infrequent.     Long history of migraine. She has a mentally handicapped son, who has seizures.     She sees Dermatology for dyshidrotic eczema to her hands, which is improved currently.     Review of Systems   Constitutional: Negative for fever.   HENT: Negative for nosebleeds.    Eyes: Negative for double vision.   Respiratory: Negative for hemoptysis.    Cardiovascular: Negative for leg swelling.   Genitourinary: Negative for hematuria.   Musculoskeletal: Negative for back pain.   Skin: Negative for rash.   Neurological: Positive for sensory change and headaches. Negative for speech change.   Endo/Heme/Allergies: Does not bruise/bleed easily.       I have reviewed all of this patient's past medical and surgical histories as well as family and social histories and active allergies and medications as documented in the electronic  medical record.    Exam:  Gen Appearance, well developed/nourished in no apparent distress  CV: 2+ distal pulses with no edema or swelling  Neuro:  MS: Awake, alert, oriented to place, person, time, situation. Sustains attention. Recent/remote memory intact, Language is full to spontaneous speech/repetition/naming/comprehension. Fund of Knowledge is full  CN: Optic discs are flat with normal vasculature, PERRL, Extraoccular movements and visual fields are full. Normal facial sensation and strength, Hearing symmetric, Tongue and Palate are midline and strong. Shoulder Shrug symmetric and strong.  Motor: Normal bulk, tone, no abnormal movements. 5/5 strength bilateral upper/lower extremities with 1+ reflexes and bilateral plantar response  Sensory: symmetric to light touch, pain, temp, and vibration except reduced in the feet to all modalities  Romberg negative  Cerebellar: Finger-nose,Heal-shin, Rapid alternating movements intact  Gait: Normal stance, no ataxia  Skin: eczema noted to hands    Assessment/Plan: Shelbie Santamaria is a 55 y.o. female with history of numbness in the feet and pain in the leg with polyneuropathy noted on EMG BLE 2019. History of borderline DM, as well as HTN, HLD, and insomnia.     I recommend;   1. Increase Cymbalta for neuropathy. This has been very effective for neuropathic complaints, and has helped her mood as well.   -she had somnolence with multiple antidepressants prior, but tolerates Cymbalta well.   -Leg cramping no worse on Cymbalta, which can aggravate RLS type complaints.   -Gabapentin provided mild relief, but resulted in depression for a time-would not increase further.   2. Rx compound cream aggravated her hand eczema.   3. Her neuropathic symptoms are mildly improved with stopping Nexium to date and she will continue off of this med. Nexium use noted for prior C. Diff infection per her. These meds have been possibly implicated in Neuropathy.   4. ABIs unremarkable and  she states Venous US with PCP was negative (that result was never sent)  5. Labs from PCP including B12, Folate, TSH, CBC were all reviewed. Fasting glucose normal at times and 126 at others. This is the likely cause of her mild neuropathy. Discussed diet with goal 10 pound weight loss in 6 months (lower carb). Repeat A1C at that time and will add SPEP/JESSE if symptoms are not resolved.   6. Patient is a former smoker-quit in 2018.  7. Robaxin helps with some her muscle cramps in the left leg. Continue this medication.   8. Episodic headache noted. No preventative treatment needed.   9. Former tobacco use.   10. She sees Dermatology for her chronic severe bilateral hand dermatitis.     FU 8 weeks

## 2021-01-25 ENCOUNTER — TELEPHONE (OUTPATIENT)
Dept: NEUROLOGY | Facility: CLINIC | Age: 56
End: 2021-01-25

## 2021-04-29 ENCOUNTER — OFFICE VISIT (OUTPATIENT)
Dept: NEUROLOGY | Facility: CLINIC | Age: 56
End: 2021-04-29
Payer: COMMERCIAL

## 2021-04-29 VITALS
HEART RATE: 78 BPM | SYSTOLIC BLOOD PRESSURE: 138 MMHG | DIASTOLIC BLOOD PRESSURE: 80 MMHG | HEIGHT: 66 IN | WEIGHT: 230.69 LBS | OXYGEN SATURATION: 94 % | BODY MASS INDEX: 37.07 KG/M2 | RESPIRATION RATE: 18 BRPM

## 2021-04-29 DIAGNOSIS — G62.9 POLYNEUROPATHY: Primary | ICD-10-CM

## 2021-04-29 DIAGNOSIS — R25.2 LEG CRAMPING: ICD-10-CM

## 2021-04-29 DIAGNOSIS — I10 HYPERTENSION, UNSPECIFIED TYPE: ICD-10-CM

## 2021-04-29 DIAGNOSIS — R73.03 PREDIABETES: ICD-10-CM

## 2021-04-29 DIAGNOSIS — E78.5 HYPERLIPIDEMIA, UNSPECIFIED HYPERLIPIDEMIA TYPE: ICD-10-CM

## 2021-04-29 PROCEDURE — 99999 PR PBB SHADOW E&M-EST. PATIENT-LVL IV: ICD-10-PCS | Mod: PBBFAC,,, | Performed by: NURSE PRACTITIONER

## 2021-04-29 PROCEDURE — 99999 PR PBB SHADOW E&M-EST. PATIENT-LVL IV: CPT | Mod: PBBFAC,,, | Performed by: NURSE PRACTITIONER

## 2021-04-29 PROCEDURE — 99214 OFFICE O/P EST MOD 30 MIN: CPT | Mod: S$GLB,,, | Performed by: NURSE PRACTITIONER

## 2021-04-29 PROCEDURE — 99214 PR OFFICE/OUTPT VISIT, EST, LEVL IV, 30-39 MIN: ICD-10-PCS | Mod: S$GLB,,, | Performed by: NURSE PRACTITIONER

## 2021-04-29 RX ORDER — PROMETHAZINE HYDROCHLORIDE 25 MG/1
TABLET ORAL
COMMUNITY
End: 2022-06-14

## 2021-04-29 RX ORDER — METHOCARBAMOL 750 MG/1
750 TABLET, FILM COATED ORAL 3 TIMES DAILY
Qty: 270 TABLET | Refills: 1 | Status: SHIPPED | OUTPATIENT
Start: 2021-04-29 | End: 2021-07-27

## 2021-04-29 RX ORDER — DULOXETIN HYDROCHLORIDE 60 MG/1
60 CAPSULE, DELAYED RELEASE ORAL DAILY
Qty: 90 CAPSULE | Refills: 1 | Status: SHIPPED | OUTPATIENT
Start: 2021-04-29 | End: 2021-10-27 | Stop reason: SDUPTHER

## 2021-10-27 ENCOUNTER — OFFICE VISIT (OUTPATIENT)
Dept: NEUROLOGY | Facility: CLINIC | Age: 56
End: 2021-10-27
Payer: COMMERCIAL

## 2021-10-27 VITALS
RESPIRATION RATE: 20 BRPM | SYSTOLIC BLOOD PRESSURE: 122 MMHG | WEIGHT: 239.19 LBS | BODY MASS INDEX: 38.44 KG/M2 | DIASTOLIC BLOOD PRESSURE: 78 MMHG | HEART RATE: 76 BPM | HEIGHT: 66 IN

## 2021-10-27 DIAGNOSIS — G43.109 MIGRAINE WITH AURA AND WITHOUT STATUS MIGRAINOSUS, NOT INTRACTABLE: ICD-10-CM

## 2021-10-27 DIAGNOSIS — R73.03 PREDIABETES: ICD-10-CM

## 2021-10-27 DIAGNOSIS — G62.9 POLYNEUROPATHY: Primary | ICD-10-CM

## 2021-10-27 DIAGNOSIS — R25.2 LEG CRAMPING: ICD-10-CM

## 2021-10-27 PROCEDURE — 99214 OFFICE O/P EST MOD 30 MIN: CPT | Mod: 25,S$GLB,, | Performed by: NURSE PRACTITIONER

## 2021-10-27 PROCEDURE — 99214 PR OFFICE/OUTPT VISIT, EST, LEVL IV, 30-39 MIN: ICD-10-PCS | Mod: 25,S$GLB,, | Performed by: NURSE PRACTITIONER

## 2021-10-27 PROCEDURE — 96372 THER/PROPH/DIAG INJ SC/IM: CPT | Mod: S$GLB,,, | Performed by: NURSE PRACTITIONER

## 2021-10-27 PROCEDURE — 99999 PR PBB SHADOW E&M-EST. PATIENT-LVL IV: CPT | Mod: PBBFAC,,, | Performed by: NURSE PRACTITIONER

## 2021-10-27 PROCEDURE — 96372 PR INJECTION,THERAP/PROPH/DIAG2ST, IM OR SUBCUT: ICD-10-PCS | Mod: S$GLB,,, | Performed by: NURSE PRACTITIONER

## 2021-10-27 PROCEDURE — 99999 PR PBB SHADOW E&M-EST. PATIENT-LVL IV: ICD-10-PCS | Mod: PBBFAC,,, | Performed by: NURSE PRACTITIONER

## 2021-10-27 RX ORDER — KETOROLAC TROMETHAMINE 30 MG/ML
30 INJECTION, SOLUTION INTRAMUSCULAR; INTRAVENOUS
Status: COMPLETED | OUTPATIENT
Start: 2021-10-27 | End: 2021-10-27

## 2021-10-27 RX ORDER — BETAMETHASONE DIPROPIONATE 0.5 MG/G
CREAM TOPICAL 2 TIMES DAILY
COMMUNITY

## 2021-10-27 RX ORDER — PROCHLORPERAZINE MALEATE 10 MG
10 TABLET ORAL DAILY PRN
Qty: 10 TABLET | Refills: 5 | Status: SHIPPED | OUTPATIENT
Start: 2021-10-27 | End: 2022-04-27 | Stop reason: SDUPTHER

## 2021-10-27 RX ORDER — DULOXETIN HYDROCHLORIDE 60 MG/1
60 CAPSULE, DELAYED RELEASE ORAL DAILY
Qty: 90 CAPSULE | Refills: 1 | Status: SHIPPED | OUTPATIENT
Start: 2021-10-27 | End: 2022-04-27 | Stop reason: SDUPTHER

## 2021-10-27 RX ORDER — METHOCARBAMOL 750 MG/1
750 TABLET, FILM COATED ORAL 3 TIMES DAILY
Qty: 270 TABLET | Refills: 1 | Status: SHIPPED | OUTPATIENT
Start: 2021-10-27 | End: 2022-04-27 | Stop reason: SDUPTHER

## 2021-10-27 RX ORDER — BRIMONIDINE TARTRATE 1.5 MG/ML
1 SOLUTION/ DROPS OPHTHALMIC 2 TIMES DAILY
COMMUNITY
Start: 2021-10-21 | End: 2022-06-14

## 2021-10-27 RX ADMIN — KETOROLAC TROMETHAMINE 30 MG: 30 INJECTION, SOLUTION INTRAMUSCULAR; INTRAVENOUS at 01:10

## 2021-10-28 ENCOUNTER — TELEPHONE (OUTPATIENT)
Dept: NEUROLOGY | Facility: CLINIC | Age: 56
End: 2021-10-28
Payer: COMMERCIAL

## 2022-02-10 ENCOUNTER — OFFICE VISIT (OUTPATIENT)
Dept: NEUROLOGY | Facility: CLINIC | Age: 57
End: 2022-02-10
Payer: COMMERCIAL

## 2022-02-10 VITALS
HEART RATE: 68 BPM | HEIGHT: 67 IN | WEIGHT: 246.94 LBS | RESPIRATION RATE: 18 BRPM | DIASTOLIC BLOOD PRESSURE: 82 MMHG | BODY MASS INDEX: 38.76 KG/M2 | SYSTOLIC BLOOD PRESSURE: 128 MMHG | OXYGEN SATURATION: 99 %

## 2022-02-10 DIAGNOSIS — G62.9 POLYNEUROPATHY: Primary | ICD-10-CM

## 2022-02-10 DIAGNOSIS — G43.709 CHRONIC MIGRAINE WITHOUT AURA WITHOUT STATUS MIGRAINOSUS, NOT INTRACTABLE: ICD-10-CM

## 2022-02-10 DIAGNOSIS — R73.03 PREDIABETES: ICD-10-CM

## 2022-02-10 PROCEDURE — 3074F SYST BP LT 130 MM HG: CPT | Mod: CPTII,S$GLB,, | Performed by: NURSE PRACTITIONER

## 2022-02-10 PROCEDURE — 1159F PR MEDICATION LIST DOCUMENTED IN MEDICAL RECORD: ICD-10-PCS | Mod: CPTII,S$GLB,, | Performed by: NURSE PRACTITIONER

## 2022-02-10 PROCEDURE — 1160F PR REVIEW ALL MEDS BY PRESCRIBER/CLIN PHARMACIST DOCUMENTED: ICD-10-PCS | Mod: CPTII,S$GLB,, | Performed by: NURSE PRACTITIONER

## 2022-02-10 PROCEDURE — 1160F RVW MEDS BY RX/DR IN RCRD: CPT | Mod: CPTII,S$GLB,, | Performed by: NURSE PRACTITIONER

## 2022-02-10 PROCEDURE — 96372 THER/PROPH/DIAG INJ SC/IM: CPT | Mod: S$GLB,,, | Performed by: NURSE PRACTITIONER

## 2022-02-10 PROCEDURE — 3008F PR BODY MASS INDEX (BMI) DOCUMENTED: ICD-10-PCS | Mod: CPTII,S$GLB,, | Performed by: NURSE PRACTITIONER

## 2022-02-10 PROCEDURE — 99214 OFFICE O/P EST MOD 30 MIN: CPT | Mod: 25,S$GLB,, | Performed by: NURSE PRACTITIONER

## 2022-02-10 PROCEDURE — 3079F PR MOST RECENT DIASTOLIC BLOOD PRESSURE 80-89 MM HG: ICD-10-PCS | Mod: CPTII,S$GLB,, | Performed by: NURSE PRACTITIONER

## 2022-02-10 PROCEDURE — 3074F PR MOST RECENT SYSTOLIC BLOOD PRESSURE < 130 MM HG: ICD-10-PCS | Mod: CPTII,S$GLB,, | Performed by: NURSE PRACTITIONER

## 2022-02-10 PROCEDURE — 99214 PR OFFICE/OUTPT VISIT, EST, LEVL IV, 30-39 MIN: ICD-10-PCS | Mod: 25,S$GLB,, | Performed by: NURSE PRACTITIONER

## 2022-02-10 PROCEDURE — 1159F MED LIST DOCD IN RCRD: CPT | Mod: CPTII,S$GLB,, | Performed by: NURSE PRACTITIONER

## 2022-02-10 PROCEDURE — 96372 PR INJECTION,THERAP/PROPH/DIAG2ST, IM OR SUBCUT: ICD-10-PCS | Mod: S$GLB,,, | Performed by: NURSE PRACTITIONER

## 2022-02-10 PROCEDURE — 99999 PR PBB SHADOW E&M-EST. PATIENT-LVL V: ICD-10-PCS | Mod: PBBFAC,,, | Performed by: NURSE PRACTITIONER

## 2022-02-10 PROCEDURE — 99999 PR PBB SHADOW E&M-EST. PATIENT-LVL V: CPT | Mod: PBBFAC,,, | Performed by: NURSE PRACTITIONER

## 2022-02-10 PROCEDURE — 3079F DIAST BP 80-89 MM HG: CPT | Mod: CPTII,S$GLB,, | Performed by: NURSE PRACTITIONER

## 2022-02-10 PROCEDURE — 3008F BODY MASS INDEX DOCD: CPT | Mod: CPTII,S$GLB,, | Performed by: NURSE PRACTITIONER

## 2022-02-10 RX ORDER — KETOROLAC TROMETHAMINE 30 MG/ML
30 INJECTION, SOLUTION INTRAMUSCULAR; INTRAVENOUS
Status: COMPLETED | OUTPATIENT
Start: 2022-02-10 | End: 2022-02-10

## 2022-02-10 RX ADMIN — KETOROLAC TROMETHAMINE 30 MG: 30 INJECTION, SOLUTION INTRAMUSCULAR; INTRAVENOUS at 12:02

## 2022-02-10 NOTE — PROGRESS NOTES
"HPI: Shelbie Santamaria is a 57 y.o. female with history of numbness in the feet and pain in the leg with polyneuropathy noted on EMG BLE 2019. History of borderline DM, as well as HTN, HLD, and insomnia.     She was seen in 10/2021 for increased headaches. She received a Toradol injection     She is seeing Ophthalmology for a "pressure cataract", and she is s/p surgery for this. She believes that her visual complaints and stress were contributing to her more severe headaches.     Migraines occur daily. They last for several hours at a time, but can last for days. Severity is 6/10. They are located bitemporally and radiate occipitally to frontally. She couldn't tolerate Topamax prior for headache prevention. She failed Elavil and Prozac. She has neck tension with her headaches. She has mild nausea, photophobia, and phonophobia. Denies "worst headache of my life". She took Fioricet prior, which was helpful. She tried Imitrex prior, but does not recall the effect.     She takes Metoprolol without effect.     Cymbalta is helpful for general anxiety; however, she continues with situational anxiety, secondary to the effects from [place]. This remains helpful for her neuropathic complaints, and gives her energy.     Her leg cramping continues to be helped with prn Robaxin.     She sees Dermatology for dyshidrotic eczema to her hands, which is improved currently.     Review of Systems   Constitutional: Negative for fever.   HENT: Negative for nosebleeds.    Eyes: Negative for double vision.   Respiratory: Negative for hemoptysis.    Cardiovascular: Negative for leg swelling.   Genitourinary: Negative for hematuria.   Musculoskeletal: Negative for back pain.   Skin: Negative for rash.   Neurological: Positive for sensory change and headaches. Negative for speech change.   Endo/Heme/Allergies: Does not bruise/bleed easily.   Psychiatric/Behavioral: Negative for depression. The patient is nervous/anxious.        I " have reviewed all of this patient's past medical and surgical histories as well as family and social histories and active allergies and medications as documented in the electronic medical record.    Exam:  Gen Appearance, well developed/nourished in no apparent distress  CV: 2+ distal pulses with no edema or swelling  Neuro:  MS: Awake, alert, oriented to place, person, time, situation. Sustains attention. Recent/remote memory intact, Language is full to spontaneous speech/repetition/naming/comprehension. Fund of Knowledge is full  CN: Optic discs are flat with normal vasculature, PERRL, Extraoccular movements and visual fields are full. Normal facial sensation and strength, Hearing symmetric, Tongue and Palate are midline and strong. Shoulder Shrug symmetric and strong.  Motor: Normal bulk, tone, no abnormal movements. 5/5 strength bilateral upper/lower extremities with 1+ reflexes and bilateral plantar response  Sensory: symmetric to light touch, pain, temp, and vibration except reduced in the feet to all modalities  Romberg negative  Cerebellar: Finger-nose,Heal-shin, Rapid alternating movements intact  Gait: Normal stance, no ataxia  Skin: eczema noted to hands    Assessment/Plan: Shelbie Santamaria is a 57 y.o. female with history of numbness in the feet and pain in the leg with polyneuropathy noted on EMG BLE 2019. History of borderline DM, as well as HTN, HLD, and insomnia.     I recommend;   1. Continue Cymbalta 60 mg for neuropathy. This has been very effective for neuropathic complaints, and has helped her mood as well.   -she had somnolence with multiple antidepressants prior, but tolerates Cymbalta well.   -Leg cramping no worse on Cymbalta, which can aggravate RLS type complaints.   -Gabapentin provided mild relief, but resulted in depression for a time-would not increase further.     2. Rx compound cream aggravated her hand eczema.     3. Her neuropathic symptoms are mildly improved with stopping  Nexium to date and she will continue off of this med. Nexium use noted for prior C. Diff infection per her. These meds have been possibly implicated in Neuropathy.     4. ABIs unremarkable and she states Venous US with PCP was negative (that result was never sent).     5. Labs from PCP including B12, Folate, TSH, CBC were all reviewed. Fasting glucose normal at times and 126 at others. This is the likely cause of her mild neuropathy. Discussed diet with goal 10 pound weight loss in 6 months (lower carb). Repeat A1C at that time and will add SPEP/JESSE if symptoms are not resolved.     6. Patient is a former smoker-quit in 2018.    7. Robaxin helps with some her muscle cramps in the left leg. Continue this medication.     8. Toradol 30 mg IM today for acute migraine-helpful prior.     Continue prn Compazine to abort migraines.   Unclear of effect of Imitrex.   Fioricet was helpful prior.     9. She has greater than 15 migraine days per month. She has tried and failed Topamax, Elavil, Metoprolol, and Prozac.     Neuro exam is unremarkable today. No need for neuroimaging at this time, unless she fails to improve.     Start Botox per migraine protocol.   Administration: Inject 0.1 mL (5 units) intramuscularly at each injection site:     - muscle- 10 units divided in 2 sites   -Porcerus muscle- 5 units in 1 site   -Frontalis muscle- 20 units divided in 4 sites   -Temporalis muscle- 40 units divided in 8 sites   -Occipitalis muscle- 30 units divided in 6 sites   -Cervical paraspinals muscle- 20 units divided in 4 sites   -Trapezius muscle- 30 units divided in 31 sites     * Doses distributed bilaterally    There is an unavoidable waste of 45 units with Botox administration, as Botox is only supplied in 100 unit and 200 unit vials.     Should she fail Botox, injectable preventative medication could be considered.     -no sulfa allergy or history or renal stones. No history of asthma.     -advised her to call me  tomorrow with response to Toradol/Benadryl. Neuro exam overall unremarkable today.     10. Former tobacco use.     11. She sees Dermatology for her chronic severe bilateral hand dermatitis    RTC for Botox  FU 2 months for Botox follow up.

## 2022-02-24 ENCOUNTER — PROCEDURE VISIT (OUTPATIENT)
Dept: NEUROLOGY | Facility: CLINIC | Age: 57
End: 2022-02-24
Payer: COMMERCIAL

## 2022-02-24 DIAGNOSIS — G43.709 CHRONIC MIGRAINE WITHOUT AURA WITHOUT STATUS MIGRAINOSUS, NOT INTRACTABLE: Primary | ICD-10-CM

## 2022-02-24 PROCEDURE — 64615 CHEMODENERV MUSC MIGRAINE: CPT | Mod: S$GLB,,, | Performed by: NURSE PRACTITIONER

## 2022-02-24 PROCEDURE — 64615 PR CHEMODENERVATION OF MUSCLE FOR CHRONIC MIGRAINE: ICD-10-PCS | Mod: S$GLB,,, | Performed by: NURSE PRACTITIONER

## 2022-02-24 NOTE — PROCEDURES
Procedures BOTOX PROCEDURE NOTE     Date of Procedure: 02/24/2022      Reason for Proceedure: Chronic Migraine       Informed consent was obtained prior to performing this study. Two patient identifiers were confirmed with the patient prior to performing this study. A time out to determine correct patient and and agreement on procedure performed was conducted prior to the injections.     Procedure Details: After informed consent obtained the patient's head and upper neck was cleansed with alcohol rub and 155 Units of Botox (diluted 1:1) was injected in the following bilateral muscles:   10 units in corregator* (over 2 sites), 5 units in Procerus, 20 units Frontalis* (over 4 sites), 40 units in Temporalis* (over 4 sites), 30 units in occipitalis* (over 6 sites), 20 units in the cervical paraspinal muscles* (over 4 sites), and 30 units in Trapezius* (over 4 sites). The remaining 45 units were wasted to follow recommended guidelines for treatment of chonic migraines with Botox   *= denotes bilateral injections    The patient tolerated the procedure well with no more than 1cc of blood loss. He was observed for several minutes post injection and given a handout from [facility] regarding when and where to seek help if side effects are experienced.

## 2022-04-12 ENCOUNTER — OFFICE VISIT (OUTPATIENT)
Dept: NEUROLOGY | Facility: CLINIC | Age: 57
End: 2022-04-12
Payer: COMMERCIAL

## 2022-04-12 VITALS
WEIGHT: 245.69 LBS | SYSTOLIC BLOOD PRESSURE: 122 MMHG | HEART RATE: 64 BPM | RESPIRATION RATE: 16 BRPM | DIASTOLIC BLOOD PRESSURE: 82 MMHG | BODY MASS INDEX: 39.48 KG/M2 | HEIGHT: 66 IN

## 2022-04-12 DIAGNOSIS — G43.709 CHRONIC MIGRAINE WITHOUT AURA WITHOUT STATUS MIGRAINOSUS, NOT INTRACTABLE: ICD-10-CM

## 2022-04-12 DIAGNOSIS — G62.9 POLYNEUROPATHY: Primary | ICD-10-CM

## 2022-04-12 PROCEDURE — 3074F SYST BP LT 130 MM HG: CPT | Mod: CPTII,S$GLB,, | Performed by: NURSE PRACTITIONER

## 2022-04-12 PROCEDURE — 3008F BODY MASS INDEX DOCD: CPT | Mod: CPTII,S$GLB,, | Performed by: NURSE PRACTITIONER

## 2022-04-12 PROCEDURE — 3079F PR MOST RECENT DIASTOLIC BLOOD PRESSURE 80-89 MM HG: ICD-10-PCS | Mod: CPTII,S$GLB,, | Performed by: NURSE PRACTITIONER

## 2022-04-12 PROCEDURE — 99214 PR OFFICE/OUTPT VISIT, EST, LEVL IV, 30-39 MIN: ICD-10-PCS | Mod: S$GLB,,, | Performed by: NURSE PRACTITIONER

## 2022-04-12 PROCEDURE — 1160F RVW MEDS BY RX/DR IN RCRD: CPT | Mod: CPTII,S$GLB,, | Performed by: NURSE PRACTITIONER

## 2022-04-12 PROCEDURE — 1159F PR MEDICATION LIST DOCUMENTED IN MEDICAL RECORD: ICD-10-PCS | Mod: CPTII,S$GLB,, | Performed by: NURSE PRACTITIONER

## 2022-04-12 PROCEDURE — 3074F PR MOST RECENT SYSTOLIC BLOOD PRESSURE < 130 MM HG: ICD-10-PCS | Mod: CPTII,S$GLB,, | Performed by: NURSE PRACTITIONER

## 2022-04-12 PROCEDURE — 99999 PR PBB SHADOW E&M-EST. PATIENT-LVL V: CPT | Mod: PBBFAC,,, | Performed by: NURSE PRACTITIONER

## 2022-04-12 PROCEDURE — 3008F PR BODY MASS INDEX (BMI) DOCUMENTED: ICD-10-PCS | Mod: CPTII,S$GLB,, | Performed by: NURSE PRACTITIONER

## 2022-04-12 PROCEDURE — 1160F PR REVIEW ALL MEDS BY PRESCRIBER/CLIN PHARMACIST DOCUMENTED: ICD-10-PCS | Mod: CPTII,S$GLB,, | Performed by: NURSE PRACTITIONER

## 2022-04-12 PROCEDURE — 99999 PR PBB SHADOW E&M-EST. PATIENT-LVL V: ICD-10-PCS | Mod: PBBFAC,,, | Performed by: NURSE PRACTITIONER

## 2022-04-12 PROCEDURE — 99214 OFFICE O/P EST MOD 30 MIN: CPT | Mod: S$GLB,,, | Performed by: NURSE PRACTITIONER

## 2022-04-12 PROCEDURE — 3079F DIAST BP 80-89 MM HG: CPT | Mod: CPTII,S$GLB,, | Performed by: NURSE PRACTITIONER

## 2022-04-12 PROCEDURE — 1159F MED LIST DOCD IN RCRD: CPT | Mod: CPTII,S$GLB,, | Performed by: NURSE PRACTITIONER

## 2022-04-12 RX ORDER — DULOXETIN HYDROCHLORIDE 30 MG/1
30 CAPSULE, DELAYED RELEASE ORAL DAILY
Qty: 30 CAPSULE | Refills: 4 | Status: SHIPPED | OUTPATIENT
Start: 2022-04-12 | End: 2022-09-07 | Stop reason: SDUPTHER

## 2022-04-12 RX ORDER — RIMEGEPANT SULFATE 75 MG/75MG
75 TABLET, ORALLY DISINTEGRATING ORAL DAILY PRN
Qty: 9 TABLET | Refills: 5 | Status: SHIPPED | OUTPATIENT
Start: 2022-04-12 | End: 2022-05-11
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
## 2022-04-27 DIAGNOSIS — G62.9 POLYNEUROPATHY: ICD-10-CM

## 2022-04-27 DIAGNOSIS — G43.109 MIGRAINE WITH AURA AND WITHOUT STATUS MIGRAINOSUS, NOT INTRACTABLE: ICD-10-CM

## 2022-04-27 DIAGNOSIS — R25.2 LEG CRAMPING: ICD-10-CM

## 2022-04-27 RX ORDER — PROCHLORPERAZINE MALEATE 10 MG
10 TABLET ORAL DAILY PRN
Qty: 10 TABLET | Refills: 5 | Status: SHIPPED | OUTPATIENT
Start: 2022-04-27 | End: 2022-09-07 | Stop reason: SDUPTHER

## 2022-04-27 RX ORDER — METHOCARBAMOL 750 MG/1
750 TABLET, FILM COATED ORAL 3 TIMES DAILY
Qty: 270 TABLET | Refills: 1 | Status: SHIPPED | OUTPATIENT
Start: 2022-04-27 | End: 2022-09-07 | Stop reason: SDUPTHER

## 2022-04-27 RX ORDER — DULOXETIN HYDROCHLORIDE 60 MG/1
60 CAPSULE, DELAYED RELEASE ORAL DAILY
Qty: 90 CAPSULE | Refills: 1 | Status: SHIPPED | OUTPATIENT
Start: 2022-04-27 | End: 2022-09-07 | Stop reason: SDUPTHER

## 2022-04-27 NOTE — TELEPHONE ENCOUNTER
----- Message from Sai Hanson sent at 2022 10:57 AM CDT -----  Contact: self  Shelbie Santamaria  MRN: [id number]  : 1965  PCP: Sunny Alexandre  Home Phone      [phone]  Work Phone      Not on file.  Mobile          [phone]      MESSAGE:     Patient is needing refill on Robaxin, Cymbalta (2strings) Compazine  Pharmacy:[facility]

## 2022-05-05 ENCOUNTER — TELEPHONE (OUTPATIENT)
Dept: NEUROLOGY | Facility: CLINIC | Age: 57
End: 2022-05-05
Payer: COMMERCIAL

## 2022-05-11 NOTE — TELEPHONE ENCOUNTER
I could re prescribe Fioricet, as this was helpful prior, but she could only use 2 days in a row, 3 days per week max. Would she like me to send this in?

## 2022-05-12 RX ORDER — BUTALBITAL, ACETAMINOPHEN AND CAFFEINE 50; 325; 40 MG/1; MG/1; MG/1
1 TABLET ORAL DAILY PRN
Qty: 10 TABLET | Refills: 4 | Status: SHIPPED | OUTPATIENT
Start: 2022-05-12 | End: 2022-06-11

## 2022-06-14 ENCOUNTER — OFFICE VISIT (OUTPATIENT)
Dept: NEUROLOGY | Facility: CLINIC | Age: 57
End: 2022-06-14
Payer: COMMERCIAL

## 2022-06-14 VITALS
DIASTOLIC BLOOD PRESSURE: 70 MMHG | OXYGEN SATURATION: 100 % | RESPIRATION RATE: 18 BRPM | WEIGHT: 240.31 LBS | BODY MASS INDEX: 38.62 KG/M2 | HEART RATE: 64 BPM | SYSTOLIC BLOOD PRESSURE: 140 MMHG | HEIGHT: 66 IN

## 2022-06-14 DIAGNOSIS — G62.9 POLYNEUROPATHY: ICD-10-CM

## 2022-06-14 DIAGNOSIS — R73.03 PREDIABETES: ICD-10-CM

## 2022-06-14 DIAGNOSIS — G43.709 CHRONIC MIGRAINE WITHOUT AURA WITHOUT STATUS MIGRAINOSUS, NOT INTRACTABLE: Primary | ICD-10-CM

## 2022-06-14 PROCEDURE — 1159F MED LIST DOCD IN RCRD: CPT | Mod: CPTII,S$GLB,, | Performed by: NURSE PRACTITIONER

## 2022-06-14 PROCEDURE — 99214 OFFICE O/P EST MOD 30 MIN: CPT | Mod: S$GLB,,, | Performed by: NURSE PRACTITIONER

## 2022-06-14 PROCEDURE — 1160F RVW MEDS BY RX/DR IN RCRD: CPT | Mod: CPTII,S$GLB,, | Performed by: NURSE PRACTITIONER

## 2022-06-14 PROCEDURE — 1160F PR REVIEW ALL MEDS BY PRESCRIBER/CLIN PHARMACIST DOCUMENTED: ICD-10-PCS | Mod: CPTII,S$GLB,, | Performed by: NURSE PRACTITIONER

## 2022-06-14 PROCEDURE — 3077F SYST BP >= 140 MM HG: CPT | Mod: CPTII,S$GLB,, | Performed by: NURSE PRACTITIONER

## 2022-06-14 PROCEDURE — 3078F DIAST BP <80 MM HG: CPT | Mod: CPTII,S$GLB,, | Performed by: NURSE PRACTITIONER

## 2022-06-14 PROCEDURE — 1159F PR MEDICATION LIST DOCUMENTED IN MEDICAL RECORD: ICD-10-PCS | Mod: CPTII,S$GLB,, | Performed by: NURSE PRACTITIONER

## 2022-06-14 PROCEDURE — 3008F BODY MASS INDEX DOCD: CPT | Mod: CPTII,S$GLB,, | Performed by: NURSE PRACTITIONER

## 2022-06-14 PROCEDURE — 99999 PR PBB SHADOW E&M-EST. PATIENT-LVL V: ICD-10-PCS | Mod: PBBFAC,,, | Performed by: NURSE PRACTITIONER

## 2022-06-14 PROCEDURE — 99999 PR PBB SHADOW E&M-EST. PATIENT-LVL V: CPT | Mod: PBBFAC,,, | Performed by: NURSE PRACTITIONER

## 2022-06-14 PROCEDURE — 3077F PR MOST RECENT SYSTOLIC BLOOD PRESSURE >= 140 MM HG: ICD-10-PCS | Mod: CPTII,S$GLB,, | Performed by: NURSE PRACTITIONER

## 2022-06-14 PROCEDURE — 3008F PR BODY MASS INDEX (BMI) DOCUMENTED: ICD-10-PCS | Mod: CPTII,S$GLB,, | Performed by: NURSE PRACTITIONER

## 2022-06-14 PROCEDURE — 99214 PR OFFICE/OUTPT VISIT, EST, LEVL IV, 30-39 MIN: ICD-10-PCS | Mod: S$GLB,,, | Performed by: NURSE PRACTITIONER

## 2022-06-14 PROCEDURE — 3078F PR MOST RECENT DIASTOLIC BLOOD PRESSURE < 80 MM HG: ICD-10-PCS | Mod: CPTII,S$GLB,, | Performed by: NURSE PRACTITIONER

## 2022-06-14 RX ORDER — GALCANEZUMAB 120 MG/ML
120 INJECTION, SOLUTION SUBCUTANEOUS
Qty: 1 EACH | Refills: 5 | Status: SHIPPED | OUTPATIENT
Start: 2022-06-14 | End: 2022-09-07

## 2022-06-14 RX ORDER — GALCANEZUMAB 120 MG/ML
240 INJECTION, SOLUTION SUBCUTANEOUS
Qty: 2 EACH | Refills: 0 | Status: SHIPPED | OUTPATIENT
Start: 2022-06-14 | End: 2022-09-07

## 2022-06-14 NOTE — PROGRESS NOTES
"HPI: Shelbie Santamaria is a 57 y.o. female with history of numbness in the feet and pain in the leg with polyneuropathy noted on EMG BLE 2019. History of borderline DM, as well as HTN, HLD, and insomnia. She is s/p right cataract removal.     She presents today for a follow up visit. Her Cymbalta was increased at her last visit for situational anxiety and increased foot paresthesias/pain. This has been helpful, but she continues with situational frustration/stress in relation to [place] related repairs.     Nurtec Rx at her last visit, which was denied by her insurance. Fioricet then re-prescribed, which is effective. She does not take this frequently. She also takes prn Compazine to abort her headaches.     Migraines unchanged currently. They last for several hours at a time, but can last for days. Severity is 6/10. They are located bitemporally and radiate occipitally to frontally.     She couldn't tolerate Topamax prior for headache prevention. She failed Elavil and Prozac. She has neck tension with her headaches. She has mild nausea, photophobia, and phonophobia. Denies "worst headache of my life". She took Fioricet prior, which was helpful. She tried Imitrex prior, but does not recall the effect.   She takes Metoprolol without effect.     Her leg cramping continues to be helped with prn Robaxin.     She sees Dermatology for dyshidrotic eczema to her hands, which is improved currently.     Review of Systems   Constitutional: Negative for fever.   HENT: Negative for nosebleeds.    Eyes: Negative for double vision.   Respiratory: Negative for hemoptysis.    Cardiovascular: Negative for leg swelling.   Genitourinary: Negative for hematuria.   Musculoskeletal: Negative for back pain.   Skin: Negative for rash.   Neurological: Positive for sensory change and headaches. Negative for speech change.   Endo/Heme/Allergies: Does not bruise/bleed easily.   Psychiatric/Behavioral: Negative for depression. The " patient is nervous/anxious.        I have reviewed all of this patient's past medical and surgical histories as well as family and social histories and active allergies and medications as documented in the electronic medical record.    Exam:  Gen Appearance, well developed/nourished in no apparent distress  CV: 2+ distal pulses with no edema or swelling  Neuro:  MS: Awake, alert, oriented to place, person, time, situation. Sustains attention. Recent/remote memory intact, Language is full to spontaneous speech/repetition/naming/comprehension. Fund of Knowledge is full  CN: Optic discs are flat with normal vasculature, PERRL, Extraoccular movements and visual fields are full. Normal facial sensation and strength, Hearing symmetric, Tongue and Palate are midline and strong. Shoulder Shrug symmetric and strong.  Motor: Normal bulk, tone, no abnormal movements. 5/5 strength bilateral upper/lower extremities with 1+ reflexes and bilateral plantar response  Sensory: symmetric to light touch, pain, temp, and vibration except reduced in the feet to all modalities  Romberg negative  Cerebellar: Finger-nose,Heal-shin, Rapid alternating movements intact  Gait: Normal stance, no ataxia  Skin: eczema noted to hands    Assessment/Plan: Shelbie Santamaria is a 57 y.o. female with history of numbness in the feet and pain in the leg with polyneuropathy noted on EMG BLE 2019. History of borderline DM, as well as HTN, HLD, and insomnia.     I recommend;   1. Continue Cymbalta 90 mg for neuropathy and ANASTACIO complaints.     -she had somnolence with multiple antidepressants prior, but tolerates Cymbalta well.     -Leg cramping no worse on Cymbalta, which can aggravate RLS type complaints.     -Gabapentin provided mild relief, but resulted in depression for a time-would not increase further.     2. Rx compound cream aggravated her hand eczema prior.     3. Her neuropathic symptoms are mildly improved with stopping Nexium to date and she  will continue off of this med. Nexium use noted for prior C. Diff infection per her. These meds have been possibly implicated in Neuropathy.     4. ABIs unremarkable and she states Venous US with PCP was negative (that result was never sent).     5. Labs from PCP including B12, Folate, TSH, CBC were all reviewed. Fasting glucose normal at times and 126 at others. This is the likely cause of her mild neuropathy. Discussed diet with goal 10 pound weight loss in 6 months (lower carb). Repeat A1C at that time and will add SPEP/JESSE if symptoms are not resolved.     6. Patient is a former smoker-quit in 2018.    7. Robaxin helps with some her muscle cramps in the left leg. Continue this medication.     8. Continue prn and Compazine to abort migraines. Limit abortive agent use to no more than 2 days in a row/3 days per week max.   -Nurtec not covered by insurance.     Toradol 30 mg IM for acute migraine was helpful prior. May repeat in clinic prn.     Unclear of effect of Imitrex.     9. She has greater than 15 migraine days per month. She has tried and failed Botox, Topamax, Elavil, Metoprolol, Prozac, and Cymbalta.   Start trial of Emgality for migraine prevention.   -Advised to keep headache log until her next visit.     Neuro exam is unremarkable today. No need for neuroimaging at this time, unless she fails to improve.     -no sulfa allergy or history or renal stones. No history of asthma.     -notify me with any worsening of migraines.     10. Former tobacco use.     11. She sees Dermatology for her chronic severe bilateral hand dermatitis    FU 10 weeks

## 2022-09-07 ENCOUNTER — OFFICE VISIT (OUTPATIENT)
Dept: NEUROLOGY | Facility: CLINIC | Age: 57
End: 2022-09-07
Payer: COMMERCIAL

## 2022-09-07 VITALS
SYSTOLIC BLOOD PRESSURE: 110 MMHG | DIASTOLIC BLOOD PRESSURE: 60 MMHG | HEART RATE: 55 BPM | RESPIRATION RATE: 12 BRPM | BODY MASS INDEX: 37.21 KG/M2 | HEIGHT: 66 IN | WEIGHT: 231.5 LBS

## 2022-09-07 DIAGNOSIS — G43.109 MIGRAINE WITH AURA AND WITHOUT STATUS MIGRAINOSUS, NOT INTRACTABLE: ICD-10-CM

## 2022-09-07 DIAGNOSIS — R73.03 PREDIABETES: ICD-10-CM

## 2022-09-07 DIAGNOSIS — G62.9 POLYNEUROPATHY: ICD-10-CM

## 2022-09-07 DIAGNOSIS — R25.2 LEG CRAMPING: ICD-10-CM

## 2022-09-07 DIAGNOSIS — G43.709 CHRONIC MIGRAINE WITHOUT AURA WITHOUT STATUS MIGRAINOSUS, NOT INTRACTABLE: Primary | ICD-10-CM

## 2022-09-07 PROCEDURE — 3078F PR MOST RECENT DIASTOLIC BLOOD PRESSURE < 80 MM HG: ICD-10-PCS | Mod: CPTII,S$GLB,, | Performed by: NURSE PRACTITIONER

## 2022-09-07 PROCEDURE — 3074F PR MOST RECENT SYSTOLIC BLOOD PRESSURE < 130 MM HG: ICD-10-PCS | Mod: CPTII,S$GLB,, | Performed by: NURSE PRACTITIONER

## 2022-09-07 PROCEDURE — 3074F SYST BP LT 130 MM HG: CPT | Mod: CPTII,S$GLB,, | Performed by: NURSE PRACTITIONER

## 2022-09-07 PROCEDURE — 3008F PR BODY MASS INDEX (BMI) DOCUMENTED: ICD-10-PCS | Mod: CPTII,S$GLB,, | Performed by: NURSE PRACTITIONER

## 2022-09-07 PROCEDURE — 99214 PR OFFICE/OUTPT VISIT, EST, LEVL IV, 30-39 MIN: ICD-10-PCS | Mod: S$GLB,,, | Performed by: NURSE PRACTITIONER

## 2022-09-07 PROCEDURE — 99214 OFFICE O/P EST MOD 30 MIN: CPT | Mod: S$GLB,,, | Performed by: NURSE PRACTITIONER

## 2022-09-07 PROCEDURE — 99999 PR PBB SHADOW E&M-EST. PATIENT-LVL IV: CPT | Mod: PBBFAC,,, | Performed by: NURSE PRACTITIONER

## 2022-09-07 PROCEDURE — 1159F PR MEDICATION LIST DOCUMENTED IN MEDICAL RECORD: ICD-10-PCS | Mod: CPTII,S$GLB,, | Performed by: NURSE PRACTITIONER

## 2022-09-07 PROCEDURE — 3078F DIAST BP <80 MM HG: CPT | Mod: CPTII,S$GLB,, | Performed by: NURSE PRACTITIONER

## 2022-09-07 PROCEDURE — 1159F MED LIST DOCD IN RCRD: CPT | Mod: CPTII,S$GLB,, | Performed by: NURSE PRACTITIONER

## 2022-09-07 PROCEDURE — 99999 PR PBB SHADOW E&M-EST. PATIENT-LVL IV: ICD-10-PCS | Mod: PBBFAC,,, | Performed by: NURSE PRACTITIONER

## 2022-09-07 PROCEDURE — 3008F BODY MASS INDEX DOCD: CPT | Mod: CPTII,S$GLB,, | Performed by: NURSE PRACTITIONER

## 2022-09-07 RX ORDER — METHOCARBAMOL 750 MG/1
750 TABLET, FILM COATED ORAL 3 TIMES DAILY
Qty: 270 TABLET | Refills: 1 | Status: SHIPPED | OUTPATIENT
Start: 2022-09-07 | End: 2022-11-23 | Stop reason: SDUPTHER

## 2022-09-07 RX ORDER — GALCANEZUMAB 120 MG/ML
240 INJECTION, SOLUTION SUBCUTANEOUS
Qty: 2 EACH | Refills: 0 | Status: SHIPPED | OUTPATIENT
Start: 2022-09-07 | End: 2022-09-21

## 2022-09-07 RX ORDER — BUTALBITAL, ACETAMINOPHEN AND CAFFEINE 50; 325; 40 MG/1; MG/1; MG/1
1 TABLET ORAL DAILY PRN
COMMUNITY
Start: 2022-08-17 | End: 2022-09-07 | Stop reason: SDUPTHER

## 2022-09-07 RX ORDER — DULOXETIN HYDROCHLORIDE 30 MG/1
30 CAPSULE, DELAYED RELEASE ORAL DAILY
Qty: 90 CAPSULE | Refills: 1 | Status: SHIPPED | OUTPATIENT
Start: 2022-09-07 | End: 2022-11-23 | Stop reason: SDUPTHER

## 2022-09-07 RX ORDER — BETAMETHASONE SODIUM PHOSPHATE AND BETAMETHASONE ACETATE 3; 3 MG/ML; MG/ML
1 INJECTION, SUSPENSION INTRA-ARTICULAR; INTRALESIONAL; INTRAMUSCULAR; SOFT TISSUE
COMMUNITY

## 2022-09-07 RX ORDER — OXYCODONE AND ACETAMINOPHEN 7.5; 325 MG/1; MG/1
1 TABLET ORAL 4 TIMES DAILY PRN
COMMUNITY
Start: 2022-09-06 | End: 2022-11-23

## 2022-09-07 RX ORDER — BUTALBITAL, ACETAMINOPHEN AND CAFFEINE 50; 325; 40 MG/1; MG/1; MG/1
1 TABLET ORAL DAILY PRN
Qty: 10 TABLET | Refills: 5 | Status: SHIPPED | OUTPATIENT
Start: 2022-09-07 | End: 2022-11-23 | Stop reason: SDUPTHER

## 2022-09-07 RX ORDER — DULOXETIN HYDROCHLORIDE 60 MG/1
60 CAPSULE, DELAYED RELEASE ORAL DAILY
Qty: 90 CAPSULE | Refills: 1 | Status: SHIPPED | OUTPATIENT
Start: 2022-09-07 | End: 2022-11-23 | Stop reason: SDUPTHER

## 2022-09-07 RX ORDER — DICYCLOMINE HYDROCHLORIDE 20 MG/1
20 TABLET ORAL 3 TIMES DAILY PRN
COMMUNITY
Start: 2022-08-02

## 2022-09-07 RX ORDER — GALCANEZUMAB 120 MG/ML
120 INJECTION, SOLUTION SUBCUTANEOUS
Qty: 1 EACH | Refills: 5 | Status: SHIPPED | OUTPATIENT
Start: 2022-09-07 | End: 2022-09-21

## 2022-09-07 RX ORDER — PROMETHAZINE HYDROCHLORIDE 25 MG/1
25 TABLET ORAL EVERY 6 HOURS PRN
COMMUNITY
Start: 2022-09-06 | End: 2023-09-05

## 2022-09-07 RX ORDER — PROCHLORPERAZINE MALEATE 10 MG
10 TABLET ORAL DAILY PRN
Qty: 10 TABLET | Refills: 5 | Status: SHIPPED | OUTPATIENT
Start: 2022-09-07 | End: 2022-11-23 | Stop reason: SDUPTHER

## 2022-09-07 RX ORDER — ONDANSETRON 8 MG/1
8 TABLET, ORALLY DISINTEGRATING ORAL 3 TIMES DAILY PRN
COMMUNITY
Start: 2022-06-28 | End: 2024-02-08

## 2022-09-07 NOTE — PROGRESS NOTES
"HPI: Shelbie Santamaria is a 57 y.o. female with history of numbness in the feet and pain in the leg with polyneuropathy noted on EMG BLE 2019. History of borderline DM, as well as HTN, HLD, and insomnia. She is s/p right cataract removal.     She presents today for a follow up visit. Emgality started at her last visit for migraine prevention, but she never received this Rx.     She continues with some situational stress. Her grandson is living with her now. She continues with home repairs from [place].     Migraines unchanged currently. They last for several hours at a time, but can last for days. Severity is 6/10. They are located bitemporally and radiate occipitally to frontally.     She couldn't tolerate Topamax prior for headache prevention. She failed Elavil and Prozac. She has neck tension with her headaches. She has mild nausea, photophobia, and phonophobia. Denies "worst headache of my life". She took Fioricet prior, which was helpful. She tried Imitrex prior, but does not recall the effect. She takes Metoprolol without effect.     Her leg cramping continues to be helped with prn Robaxin.     She sees Dermatology for dyshidrotic eczema to her hands, which is improved currently.     Review of Systems   Constitutional:  Negative for fever.   HENT:  Negative for nosebleeds.    Eyes:  Negative for double vision.   Respiratory:  Negative for hemoptysis.    Cardiovascular:  Negative for leg swelling.   Genitourinary:  Negative for hematuria.   Musculoskeletal:  Negative for back pain.   Skin:  Negative for rash.   Neurological:  Positive for sensory change and headaches. Negative for speech change.   Endo/Heme/Allergies:  Does not bruise/bleed easily.   Psychiatric/Behavioral:  Negative for depression. The patient is nervous/anxious.      I have reviewed all of this patient's past medical and surgical histories as well as family and social histories and active allergies and medications as documented in " the electronic medical record.    Exam:  Gen Appearance, well developed/nourished in no apparent distress  CV: 2+ distal pulses with no edema or swelling  Neuro:  MS: Awake, alert, oriented to place, person, time, situation. Sustains attention. Recent/remote memory intact, Language is full to spontaneous speech/repetition/naming/comprehension. Fund of Knowledge is full  CN: Optic discs are flat with normal vasculature, PERRL, Extraoccular movements and visual fields are full. Normal facial sensation and strength, Hearing symmetric, Tongue and Palate are midline and strong. Shoulder Shrug symmetric and strong.  Motor: Normal bulk, tone, no abnormal movements. 5/5 strength bilateral upper/lower extremities with 1+ reflexes and bilateral plantar response  Sensory: symmetric to light touch, pain, temp, and vibration except reduced in the feet to all modalities  Romberg negative  Cerebellar: Finger-nose,Heal-shin, Rapid alternating movements intact  Gait: Normal stance, no ataxia  Skin: eczema noted to hands    Assessment/Plan: Shelbie Santamaria is a 57 y.o. female with history of numbness in the feet and pain in the leg with polyneuropathy noted on EMG BLE 2019. History of borderline DM, as well as HTN, HLD, and insomnia.     I recommend;   1. Continue Cymbalta 90 mg for neuropathy and ANASTACIO complaints.     -she had somnolence with multiple antidepressants prior, but tolerates Cymbalta well.     -Leg cramping no worse on Cymbalta, which can aggravate RLS type complaints.     -Gabapentin provided mild relief, but resulted in depression for a time-would not increase further.     2. Rx compound cream aggravated her hand eczema prior.     3. Her neuropathic symptoms are mildly improved with stopping Nexium to date and she will continue off of this med. Nexium use noted for prior C. Diff infection per her. These meds have been possibly implicated in Neuropathy.     4. ABIs unremarkable and she states Venous US with PCP  was negative (that result was never sent).     5. Labs from PCP including B12, Folate, TSH, CBC were all reviewed. Fasting glucose normal at times and 126 at others. This is the likely cause of her mild neuropathy. Discussed diet with goal 10 pound weight loss in 6 months (lower carb). Repeat A1C at that time and will add SPEP/JESSE if symptoms are not resolved.     6. Patient is a former smoker-quit in 2018.    7. Robaxin helps with some her muscle cramps in the left leg. Continue this medication.     8. Continue prn and Compazine to abort migraines. Limit abortive agent use to no more than 2 days in a row/3 days per week max.   -Nurtec not covered by insurance.     Toradol 30 mg IM for acute migraine was helpful prior. May repeat in clinic prn.     Unclear of effect of Imitrex.     9. She has greater than 15 migraine days per month. She has tried and failed Botox, Topamax, Elavil, Metoprolol, Prozac, and Cymbalta.   Start trial of Emgality for migraine prevention.   -Advised to keep headache log until her next visit.     Neuro exam is unremarkable today. No need for neuroimaging at this time, unless she fails to improve.     -no sulfa allergy or history or renal stones. No history of asthma.     -notify me with any worsening of migraines.     10. Former tobacco use.     11. She sees Dermatology for her chronic severe bilateral hand dermatitis    FU 10 weeks

## 2022-09-13 ENCOUNTER — PATIENT MESSAGE (OUTPATIENT)
Dept: NEUROLOGY | Facility: CLINIC | Age: 57
End: 2022-09-13
Payer: COMMERCIAL

## 2022-09-21 ENCOUNTER — TELEPHONE (OUTPATIENT)
Dept: NEUROLOGY | Facility: CLINIC | Age: 57
End: 2022-09-21
Payer: COMMERCIAL

## 2022-09-21 DIAGNOSIS — G43.709 CHRONIC MIGRAINE WITHOUT AURA WITHOUT STATUS MIGRAINOSUS, NOT INTRACTABLE: Primary | ICD-10-CM

## 2022-09-21 RX ORDER — ERENUMAB-AOOE 70 MG/ML
70 INJECTION SUBCUTANEOUS
Qty: 1 EACH | Refills: 5 | Status: SHIPPED | OUTPATIENT
Start: 2022-09-21 | End: 2022-09-21 | Stop reason: SDUPTHER

## 2022-09-21 RX ORDER — ERENUMAB-AOOE 70 MG/ML
70 INJECTION SUBCUTANEOUS
Qty: 1 EACH | Refills: 5 | Status: SHIPPED | OUTPATIENT
Start: 2022-09-21 | End: 2023-09-05

## 2022-09-21 NOTE — TELEPHONE ENCOUNTER
Emgality was not covered by her insurance. I am sending in an Rx for Aimovig,in an attempt to get this covered. Let her know this is a similar agent.

## 2022-09-28 ENCOUNTER — PATIENT MESSAGE (OUTPATIENT)
Dept: NEUROLOGY | Facility: CLINIC | Age: 57
End: 2022-09-28
Payer: COMMERCIAL

## 2022-11-23 ENCOUNTER — OFFICE VISIT (OUTPATIENT)
Dept: NEUROLOGY | Facility: CLINIC | Age: 57
End: 2022-11-23
Payer: COMMERCIAL

## 2022-11-23 VITALS
WEIGHT: 222.31 LBS | BODY MASS INDEX: 35.73 KG/M2 | DIASTOLIC BLOOD PRESSURE: 82 MMHG | HEIGHT: 66 IN | SYSTOLIC BLOOD PRESSURE: 124 MMHG | HEART RATE: 74 BPM | RESPIRATION RATE: 14 BRPM

## 2022-11-23 DIAGNOSIS — R73.03 PREDIABETES: ICD-10-CM

## 2022-11-23 DIAGNOSIS — G62.9 POLYNEUROPATHY: ICD-10-CM

## 2022-11-23 DIAGNOSIS — G43.109 MIGRAINE WITH AURA AND WITHOUT STATUS MIGRAINOSUS, NOT INTRACTABLE: ICD-10-CM

## 2022-11-23 DIAGNOSIS — R25.2 LEG CRAMPING: ICD-10-CM

## 2022-11-23 DIAGNOSIS — G43.709 CHRONIC MIGRAINE WITHOUT AURA WITHOUT STATUS MIGRAINOSUS, NOT INTRACTABLE: Primary | ICD-10-CM

## 2022-11-23 PROCEDURE — 3079F PR MOST RECENT DIASTOLIC BLOOD PRESSURE 80-89 MM HG: ICD-10-PCS | Mod: CPTII,S$GLB,, | Performed by: NURSE PRACTITIONER

## 2022-11-23 PROCEDURE — 1159F PR MEDICATION LIST DOCUMENTED IN MEDICAL RECORD: ICD-10-PCS | Mod: CPTII,S$GLB,, | Performed by: NURSE PRACTITIONER

## 2022-11-23 PROCEDURE — 3079F DIAST BP 80-89 MM HG: CPT | Mod: CPTII,S$GLB,, | Performed by: NURSE PRACTITIONER

## 2022-11-23 PROCEDURE — 99999 PR PBB SHADOW E&M-EST. PATIENT-LVL IV: ICD-10-PCS | Mod: PBBFAC,,, | Performed by: NURSE PRACTITIONER

## 2022-11-23 PROCEDURE — 99999 PR PBB SHADOW E&M-EST. PATIENT-LVL IV: CPT | Mod: PBBFAC,,, | Performed by: NURSE PRACTITIONER

## 2022-11-23 PROCEDURE — 99214 PR OFFICE/OUTPT VISIT, EST, LEVL IV, 30-39 MIN: ICD-10-PCS | Mod: S$GLB,,, | Performed by: NURSE PRACTITIONER

## 2022-11-23 PROCEDURE — 3008F PR BODY MASS INDEX (BMI) DOCUMENTED: ICD-10-PCS | Mod: CPTII,S$GLB,, | Performed by: NURSE PRACTITIONER

## 2022-11-23 PROCEDURE — 3008F BODY MASS INDEX DOCD: CPT | Mod: CPTII,S$GLB,, | Performed by: NURSE PRACTITIONER

## 2022-11-23 PROCEDURE — 1159F MED LIST DOCD IN RCRD: CPT | Mod: CPTII,S$GLB,, | Performed by: NURSE PRACTITIONER

## 2022-11-23 PROCEDURE — 99214 OFFICE O/P EST MOD 30 MIN: CPT | Mod: S$GLB,,, | Performed by: NURSE PRACTITIONER

## 2022-11-23 PROCEDURE — 3074F PR MOST RECENT SYSTOLIC BLOOD PRESSURE < 130 MM HG: ICD-10-PCS | Mod: CPTII,S$GLB,, | Performed by: NURSE PRACTITIONER

## 2022-11-23 PROCEDURE — 3074F SYST BP LT 130 MM HG: CPT | Mod: CPTII,S$GLB,, | Performed by: NURSE PRACTITIONER

## 2022-11-23 RX ORDER — DULOXETIN HYDROCHLORIDE 60 MG/1
60 CAPSULE, DELAYED RELEASE ORAL DAILY
Qty: 90 CAPSULE | Refills: 1 | Status: SHIPPED | OUTPATIENT
Start: 2022-11-23 | End: 2023-05-24 | Stop reason: SDUPTHER

## 2022-11-23 RX ORDER — METHOCARBAMOL 750 MG/1
750 TABLET, FILM COATED ORAL 3 TIMES DAILY
Qty: 270 TABLET | Refills: 1 | Status: SHIPPED | OUTPATIENT
Start: 2022-11-23 | End: 2023-05-24 | Stop reason: SDUPTHER

## 2022-11-23 RX ORDER — BUTALBITAL, ACETAMINOPHEN AND CAFFEINE 50; 325; 40 MG/1; MG/1; MG/1
1 TABLET ORAL DAILY PRN
Qty: 10 TABLET | Refills: 5 | Status: SHIPPED | OUTPATIENT
Start: 2022-11-23 | End: 2023-05-24 | Stop reason: SDUPTHER

## 2022-11-23 RX ORDER — PROCHLORPERAZINE MALEATE 10 MG
10 TABLET ORAL DAILY PRN
Qty: 10 TABLET | Refills: 5 | Status: SHIPPED | OUTPATIENT
Start: 2022-11-23 | End: 2023-05-24 | Stop reason: SDUPTHER

## 2022-11-23 RX ORDER — DULOXETIN HYDROCHLORIDE 30 MG/1
30 CAPSULE, DELAYED RELEASE ORAL DAILY
Qty: 90 CAPSULE | Refills: 1 | Status: SHIPPED | OUTPATIENT
Start: 2022-11-23 | End: 2023-05-24 | Stop reason: SDUPTHER

## 2022-11-23 NOTE — PROGRESS NOTES
HPI: Shelbie Santamaria is a 57 y.o. female with history of numbness in the feet and pain in the leg with polyneuropathy noted on EMG BLE 2019. History of borderline DM, as well as HTN, HLD, and insomnia. She is s/p right cataract removal.     She presents today for a follow up visit. Emgality started at her last visit for migraine prevention, but this was not covered by her insurance. Aimovig was started, which has been completely effective for her migraines, which are now resolved.     Her leg cramping continues to be helped with prn Robaxin. This is improved lately.     She sees Dermatology for dyshidrotic eczema to her hands, which is improved currently.     Neuropathy and depression are still helped with Cymbalta.     Review of Systems   Constitutional:  Negative for fever.   HENT:  Negative for nosebleeds.    Eyes:  Negative for double vision.   Respiratory:  Negative for hemoptysis.    Cardiovascular:  Negative for leg swelling.   Genitourinary:  Negative for hematuria.   Musculoskeletal:  Negative for back pain.   Skin:  Negative for rash.   Neurological:  Positive for sensory change. Negative for speech change and headaches.   Endo/Heme/Allergies:  Does not bruise/bleed easily.   Psychiatric/Behavioral:  Negative for depression. The patient is not nervous/anxious.      I have reviewed all of this patient's past medical and surgical histories as well as family and social histories and active allergies and medications as documented in the electronic medical record.    Exam:  Gen Appearance, well developed/nourished in no apparent distress  CV: 2+ distal pulses with no edema or swelling  Neuro:  MS: Awake, alert, oriented to place, person, time, situation. Sustains attention. Recent/remote memory intact, Language is full to spontaneous speech/repetition/naming/comprehension. Fund of Knowledge is full  CN: Optic discs are flat with normal vasculature, PERRL, Extraoccular movements and visual fields are  full. Normal facial sensation and strength, Hearing symmetric, Tongue and Palate are midline and strong. Shoulder Shrug symmetric and strong.  Motor: Normal bulk, tone, no abnormal movements. 5/5 strength bilateral upper/lower extremities with 1+ reflexes and bilateral plantar response  Sensory: symmetric to light touch, pain, temp, and vibration except reduced in the feet to all modalities  Romberg negative  Cerebellar: Finger-nose,Heal-shin, Rapid alternating movements intact  Gait: Normal stance, no ataxia  Skin: eczema noted to hands    Assessment/Plan: Shelbie Santamaria is a 57 y.o. female with history of numbness in the feet and pain in the leg with polyneuropathy noted on EMG BLE 2019. History of borderline DM, as well as HTN, HLD, and insomnia.     I recommend;   1. Continue Cymbalta 90 mg for neuropathy and ANASTACIO complaints.   -she had somnolence with multiple antidepressants prior, but tolerates Cymbalta well.     -Leg cramping no worse on Cymbalta, which can aggravate RLS type complaints.     -Gabapentin provided mild relief, but resulted in depression for a time-would not increase further.     2. Rx compound cream aggravated her hand eczema prior.     3. Her neuropathic symptoms are mildly improved with stopping Nexium to date and she will continue off of this med. Nexium use noted for prior C. Diff infection per her. These meds have been possibly implicated in Neuropathy.     4. ABIs unremarkable and she states Venous US with PCP was negative (that result was never sent).     5. Neuropathy labs showed elevated fasting glucose.This is the likely cause of her mild neuropathy. Discussed diet/ weight loss for improvement and to prevent worsening    6. Patient is a former smoker-quit in 2018.    7. Robaxin helps with some her muscle cramps in the left leg. Continue this medication.     8. Continue prn and Compazine to abort migraines. Limit abortive agent use to no more than 2 days in a row/3 days per  week max.   -Nurtec not covered by insurance.     Toradol 30 mg IM for acute migraine was helpful prior. May repeat in clinic prn.     Unclear of effect of Imitrex.     9. Continue Aimovig for migraine prevention, as this is fully effective.   -She had greater than 15 migraine days per month prior to starting Aimovig. She has tried and failed Botox, Topamax, Elavil, Metoprolol, Prozac, and Cymbalta.   -Emgality not approved by her insurance.   -Advised to keep headache log until her next visit.     -No need for neuroimaging at this time, unless she fails to improve.     -no sulfa allergy or history or renal stones. No history of asthma.     -notify me with any worsening of migraines.     10. Former tobacco use.     11. She sees Dermatology for her chronic severe bilateral hand dermatitis    FU 6 months

## 2023-02-03 ENCOUNTER — TELEPHONE (OUTPATIENT)
Dept: NEUROLOGY | Facility: CLINIC | Age: 58
End: 2023-02-03
Payer: COMMERCIAL

## 2023-02-03 NOTE — TELEPHONE ENCOUNTER
----- Message from Jeana Lee sent at 2/3/2023  2:52 PM CST -----  Contact: PATIENT  Shelbie Santamaria  MRN: [id number]  : 1965  PCP: Sunny Alexandre  Home Phone      [phone]  Work Phone      Not on file.  Mobile          [phone]      MESSAGE: Patient would like to stop the Aimovig stating it no longer works.        Phone: [phone]

## 2023-02-03 NOTE — TELEPHONE ENCOUNTER
Spoke with patient. States that she has had no relief from the Aimovig that was prescribed. States that she still takes the Compazine and Fioricet as needed. Requesting recommendations. Aware Shanon Crespo NP is out of the office until 2/7/2023. Please advise.

## 2023-02-07 NOTE — TELEPHONE ENCOUNTER
It did appear to be effective at her last visit. If she is having headaches currently, let's schedule her a visit to discuss further.

## 2023-03-02 ENCOUNTER — PATIENT MESSAGE (OUTPATIENT)
Dept: NEUROLOGY | Facility: CLINIC | Age: 58
End: 2023-03-02
Payer: COMMERCIAL

## 2023-05-24 ENCOUNTER — OFFICE VISIT (OUTPATIENT)
Dept: NEUROLOGY | Facility: CLINIC | Age: 58
End: 2023-05-24
Payer: COMMERCIAL

## 2023-05-24 VITALS
BODY MASS INDEX: 36.74 KG/M2 | DIASTOLIC BLOOD PRESSURE: 72 MMHG | OXYGEN SATURATION: 99 % | HEART RATE: 70 BPM | WEIGHT: 228.63 LBS | HEIGHT: 66 IN | SYSTOLIC BLOOD PRESSURE: 124 MMHG | RESPIRATION RATE: 16 BRPM

## 2023-05-24 DIAGNOSIS — G43.109 MIGRAINE WITH AURA AND WITHOUT STATUS MIGRAINOSUS, NOT INTRACTABLE: ICD-10-CM

## 2023-05-24 DIAGNOSIS — E78.5 HYPERLIPIDEMIA, UNSPECIFIED HYPERLIPIDEMIA TYPE: ICD-10-CM

## 2023-05-24 DIAGNOSIS — G43.709 CHRONIC MIGRAINE WITHOUT AURA WITHOUT STATUS MIGRAINOSUS, NOT INTRACTABLE: Primary | ICD-10-CM

## 2023-05-24 DIAGNOSIS — I10 HYPERTENSION, UNSPECIFIED TYPE: ICD-10-CM

## 2023-05-24 DIAGNOSIS — G62.9 POLYNEUROPATHY: ICD-10-CM

## 2023-05-24 DIAGNOSIS — R25.2 LEG CRAMPING: ICD-10-CM

## 2023-05-24 DIAGNOSIS — R73.03 PREDIABETES: ICD-10-CM

## 2023-05-24 DIAGNOSIS — G47.00 INSOMNIA, UNSPECIFIED TYPE: ICD-10-CM

## 2023-05-24 PROCEDURE — 3074F PR MOST RECENT SYSTOLIC BLOOD PRESSURE < 130 MM HG: ICD-10-PCS | Mod: CPTII,S$GLB,, | Performed by: NURSE PRACTITIONER

## 2023-05-24 PROCEDURE — 99999 PR PBB SHADOW E&M-EST. PATIENT-LVL V: ICD-10-PCS | Mod: PBBFAC,,, | Performed by: NURSE PRACTITIONER

## 2023-05-24 PROCEDURE — 99999 PR PBB SHADOW E&M-EST. PATIENT-LVL V: CPT | Mod: PBBFAC,,, | Performed by: NURSE PRACTITIONER

## 2023-05-24 PROCEDURE — 3074F SYST BP LT 130 MM HG: CPT | Mod: CPTII,S$GLB,, | Performed by: NURSE PRACTITIONER

## 2023-05-24 PROCEDURE — 1159F MED LIST DOCD IN RCRD: CPT | Mod: CPTII,S$GLB,, | Performed by: NURSE PRACTITIONER

## 2023-05-24 PROCEDURE — 3078F DIAST BP <80 MM HG: CPT | Mod: CPTII,S$GLB,, | Performed by: NURSE PRACTITIONER

## 2023-05-24 PROCEDURE — 1160F RVW MEDS BY RX/DR IN RCRD: CPT | Mod: CPTII,S$GLB,, | Performed by: NURSE PRACTITIONER

## 2023-05-24 PROCEDURE — 99214 OFFICE O/P EST MOD 30 MIN: CPT | Mod: S$GLB,,, | Performed by: NURSE PRACTITIONER

## 2023-05-24 PROCEDURE — 99214 PR OFFICE/OUTPT VISIT, EST, LEVL IV, 30-39 MIN: ICD-10-PCS | Mod: S$GLB,,, | Performed by: NURSE PRACTITIONER

## 2023-05-24 PROCEDURE — 1160F PR REVIEW ALL MEDS BY PRESCRIBER/CLIN PHARMACIST DOCUMENTED: ICD-10-PCS | Mod: CPTII,S$GLB,, | Performed by: NURSE PRACTITIONER

## 2023-05-24 PROCEDURE — 3008F BODY MASS INDEX DOCD: CPT | Mod: CPTII,S$GLB,, | Performed by: NURSE PRACTITIONER

## 2023-05-24 PROCEDURE — 3008F PR BODY MASS INDEX (BMI) DOCUMENTED: ICD-10-PCS | Mod: CPTII,S$GLB,, | Performed by: NURSE PRACTITIONER

## 2023-05-24 PROCEDURE — 1159F PR MEDICATION LIST DOCUMENTED IN MEDICAL RECORD: ICD-10-PCS | Mod: CPTII,S$GLB,, | Performed by: NURSE PRACTITIONER

## 2023-05-24 PROCEDURE — 3078F PR MOST RECENT DIASTOLIC BLOOD PRESSURE < 80 MM HG: ICD-10-PCS | Mod: CPTII,S$GLB,, | Performed by: NURSE PRACTITIONER

## 2023-05-24 RX ORDER — DULOXETIN HYDROCHLORIDE 30 MG/1
30 CAPSULE, DELAYED RELEASE ORAL DAILY
Qty: 90 CAPSULE | Refills: 1 | Status: SHIPPED | OUTPATIENT
Start: 2023-05-24 | End: 2023-09-05 | Stop reason: SDUPTHER

## 2023-05-24 RX ORDER — METHOCARBAMOL 750 MG/1
750 TABLET, FILM COATED ORAL 3 TIMES DAILY
Qty: 270 TABLET | Refills: 1 | Status: SHIPPED | OUTPATIENT
Start: 2023-05-24 | End: 2023-09-05 | Stop reason: SDUPTHER

## 2023-05-24 RX ORDER — DULOXETIN HYDROCHLORIDE 60 MG/1
60 CAPSULE, DELAYED RELEASE ORAL DAILY
Qty: 90 CAPSULE | Refills: 1 | Status: SHIPPED | OUTPATIENT
Start: 2023-05-24 | End: 2023-09-05 | Stop reason: SDUPTHER

## 2023-05-24 RX ORDER — NORTRIPTYLINE HYDROCHLORIDE 25 MG/1
25 CAPSULE ORAL NIGHTLY
Qty: 30 CAPSULE | Refills: 5 | Status: SHIPPED | OUTPATIENT
Start: 2023-05-24 | End: 2023-09-05

## 2023-05-24 RX ORDER — BUTALBITAL, ACETAMINOPHEN AND CAFFEINE 50; 325; 40 MG/1; MG/1; MG/1
1 TABLET ORAL DAILY PRN
Qty: 10 TABLET | Refills: 5 | Status: SHIPPED | OUTPATIENT
Start: 2023-05-24 | End: 2023-09-05 | Stop reason: SDUPTHER

## 2023-05-24 RX ORDER — PROCHLORPERAZINE MALEATE 10 MG
10 TABLET ORAL DAILY PRN
Qty: 10 TABLET | Refills: 5 | Status: SHIPPED | OUTPATIENT
Start: 2023-05-24 | End: 2023-09-05 | Stop reason: SDUPTHER

## 2023-05-24 NOTE — PATIENT INSTRUCTIONS
Self care activities and time alone each day are advised to help reduce daily stress, which is likely contributing to headaches.     Keep a headache log until your next visit.

## 2023-05-24 NOTE — PROGRESS NOTES
HPI: Shelbie Santamaria is a 58 y.o. female with history of numbness in the feet and pain in the leg with polyneuropathy noted on EMG BLE 2019. History of borderline DM, as well as HTN, HLD, and insomnia. She is s/p right cataract removal.     She presents today for a follow up visit. She stopped taking Aimovig for migraine prevention since her last visit. At her last visit in 11/2022, she reported great efficacy with this. She later called clinic in 2/2023 to report that Aimovig was no longer effective. She was asked to make an appointment to discuss further changes.     She has a great deal of home stress. She has an adult handicapped son, and now her five year old autistic grandson is living with her.    She had pneumonia since her last visit, but this occurred after her headaches worsened. She recently found mold growing behind her bed.     Current headaches occur 4-5x per week. Duration is 4-several hours. Location is bitemporal/bifrontal. Stress does trigger her headaches. + photophobia, + phonophobia, nausea, but no focal weakness, numbness/tingling, dizziness. Laying in a dark room and taking Fioricet or Compazine is helpful. She does limit her Fioricet. Denies neck pain.     She does not sleep well at night. Lunesta has a variable effect on her. She shops excessively at night, then doesn't remember this the next day.     Her leg cramping continues to be helped with prn Robaxin. This is improved lately.     She sees Dermatology for dyshidrotic eczema to her hands, which is improved currently.     Neuropathy and depression are still helped with Cymbalta.     Review of Systems   Constitutional:  Negative for fever.   HENT:  Negative for nosebleeds.    Eyes:  Negative for double vision.   Respiratory:  Negative for hemoptysis.    Cardiovascular:  Negative for leg swelling.   Genitourinary:  Negative for hematuria.   Musculoskeletal:  Negative for back pain.   Skin:  Negative for rash.   Neurological:   Positive for sensory change and headaches. Negative for speech change.   Endo/Heme/Allergies:  Does not bruise/bleed easily.   Psychiatric/Behavioral:  Negative for depression. The patient is nervous/anxious and has insomnia.      I have reviewed all of this patient's past medical and surgical histories as well as family and social histories and active allergies and medications as documented in the electronic medical record.    Exam:  Gen Appearance, well developed/nourished in no apparent distress  CV: 2+ distal pulses with no edema or swelling  Neuro:  MS: Awake, alert, oriented to place, person, time, situation. Sustains attention. Recent/remote memory intact, Language is full to spontaneous speech/repetition/naming/comprehension. Fund of Knowledge is full  CN: PERRL, Extraoccular movements and visual fields are full. Normal facial sensation and strength, Hearing symmetric, Tongue and Palate are midline and strong. Shoulder Shrug symmetric and strong.  Motor: Normal bulk, tone, no abnormal movements. 5/5 strength bilateral upper/lower extremities with 1+ reflexes and bilateral plantar response  Sensory: symmetric to light touch, pain, temp, and vibration except reduced in the feet to all modalities  Romberg negative  Cerebellar: Finger-nose,Heal-shin, Rapid alternating movements intact  Gait: Normal stance, no ataxia  Skin: eczema noted to hands    Assessment/Plan: Shelbie Santamaria is a 58 y.o. female with history of numbness in the feet and pain in the leg with polyneuropathy noted on EMG BLE 2019. History of borderline DM, as well as HTN, HLD, and insomnia.     I recommend;   1. Continue Cymbalta 90 mg for neuropathy and ANASTACIO complaints.   -she had somnolence with multiple antidepressants prior, but tolerates Cymbalta well.     -Leg cramping no worse on Cymbalta, which can aggravate RLS type complaints.     -Gabapentin provided mild relief, but resulted in depression for a time-would not increase further.      2. Rx compound cream aggravated her hand eczema prior.     3. Her neuropathic symptoms are mildly improved with stopping Nexium to date and she will continue off of this med. Nexium use noted for prior C. Diff infection per her. These meds have been possibly implicated in Neuropathy.     4. ABIs unremarkable and she states Venous US with PCP was negative (that result was never sent).     5. Neuropathy labs showed elevated fasting glucose.This is the likely cause of her mild neuropathy. Discussed diet/ weight loss for improvement and to prevent worsening    6. Patient is a former smoker-quit in 2018.    7. Robaxin helps with some her muscle cramps in the left leg. Continue this medication.     8. Continue prn and Compazine to abort migraines. Limit abortive agent use to no more than 2 days in a row/3 days per week max.   -Nurtec not covered by insurance.     Toradol 30 mg IM for acute migraine was helpful prior. May repeat in clinic prn.     Unclear of effect of Imitrex.     9. Start trial of Pamelor 25 mg for migraine prevention and also for insomnis. STOP Lunesta. Her evening memory issues with shopping may be related to this.   -notify me with any mood issues or excess sedation.   -there is a low risk of serotonin syndrome with concurrent use of Cymbalta and Pamelor.   -She has tried and failed Aimovig, Botox, Topamax, Elavil, Metoprolol, Prozac, and Cymbalta.   -Emgality not approved by her insurance.   -Advised to keep headache log until her next visit.     -No need for neuroimaging at this time, unless she fails to improve.     -no sulfa allergy or history or renal stones. No history of asthma.     -notify me with any worsening of migraines.     10. Former tobacco use.     11. She sees Dermatology for her chronic severe bilateral hand dermatitis    FU 3 months

## 2023-09-05 ENCOUNTER — OFFICE VISIT (OUTPATIENT)
Dept: NEUROLOGY | Facility: CLINIC | Age: 58
End: 2023-09-05
Payer: COMMERCIAL

## 2023-09-05 VITALS
OXYGEN SATURATION: 99 % | HEIGHT: 66 IN | DIASTOLIC BLOOD PRESSURE: 82 MMHG | HEART RATE: 70 BPM | RESPIRATION RATE: 16 BRPM | SYSTOLIC BLOOD PRESSURE: 132 MMHG | BODY MASS INDEX: 36.54 KG/M2 | WEIGHT: 227.38 LBS

## 2023-09-05 DIAGNOSIS — E78.5 HYPERLIPIDEMIA, UNSPECIFIED HYPERLIPIDEMIA TYPE: ICD-10-CM

## 2023-09-05 DIAGNOSIS — G62.9 POLYNEUROPATHY: ICD-10-CM

## 2023-09-05 DIAGNOSIS — R25.2 LEG CRAMPING: ICD-10-CM

## 2023-09-05 DIAGNOSIS — R73.03 PREDIABETES: ICD-10-CM

## 2023-09-05 DIAGNOSIS — I10 HYPERTENSION, UNSPECIFIED TYPE: ICD-10-CM

## 2023-09-05 DIAGNOSIS — G43.109 MIGRAINE WITH AURA AND WITHOUT STATUS MIGRAINOSUS, NOT INTRACTABLE: ICD-10-CM

## 2023-09-05 DIAGNOSIS — G43.709 CHRONIC MIGRAINE WITHOUT AURA WITHOUT STATUS MIGRAINOSUS, NOT INTRACTABLE: Primary | ICD-10-CM

## 2023-09-05 PROCEDURE — 3008F PR BODY MASS INDEX (BMI) DOCUMENTED: ICD-10-PCS | Mod: CPTII,S$GLB,, | Performed by: NURSE PRACTITIONER

## 2023-09-05 PROCEDURE — 1159F MED LIST DOCD IN RCRD: CPT | Mod: CPTII,S$GLB,, | Performed by: NURSE PRACTITIONER

## 2023-09-05 PROCEDURE — 3079F PR MOST RECENT DIASTOLIC BLOOD PRESSURE 80-89 MM HG: ICD-10-PCS | Mod: CPTII,S$GLB,, | Performed by: NURSE PRACTITIONER

## 2023-09-05 PROCEDURE — 99999 PR PBB SHADOW E&M-EST. PATIENT-LVL IV: CPT | Mod: PBBFAC,,, | Performed by: NURSE PRACTITIONER

## 2023-09-05 PROCEDURE — 3075F SYST BP GE 130 - 139MM HG: CPT | Mod: CPTII,S$GLB,, | Performed by: NURSE PRACTITIONER

## 2023-09-05 PROCEDURE — 99999 PR PBB SHADOW E&M-EST. PATIENT-LVL IV: ICD-10-PCS | Mod: PBBFAC,,, | Performed by: NURSE PRACTITIONER

## 2023-09-05 PROCEDURE — 3075F PR MOST RECENT SYSTOLIC BLOOD PRESS GE 130-139MM HG: ICD-10-PCS | Mod: CPTII,S$GLB,, | Performed by: NURSE PRACTITIONER

## 2023-09-05 PROCEDURE — 99214 OFFICE O/P EST MOD 30 MIN: CPT | Mod: S$GLB,,, | Performed by: NURSE PRACTITIONER

## 2023-09-05 PROCEDURE — 3079F DIAST BP 80-89 MM HG: CPT | Mod: CPTII,S$GLB,, | Performed by: NURSE PRACTITIONER

## 2023-09-05 PROCEDURE — 1159F PR MEDICATION LIST DOCUMENTED IN MEDICAL RECORD: ICD-10-PCS | Mod: CPTII,S$GLB,, | Performed by: NURSE PRACTITIONER

## 2023-09-05 PROCEDURE — 3008F BODY MASS INDEX DOCD: CPT | Mod: CPTII,S$GLB,, | Performed by: NURSE PRACTITIONER

## 2023-09-05 PROCEDURE — 99214 PR OFFICE/OUTPT VISIT, EST, LEVL IV, 30-39 MIN: ICD-10-PCS | Mod: S$GLB,,, | Performed by: NURSE PRACTITIONER

## 2023-09-05 RX ORDER — PROCHLORPERAZINE MALEATE 10 MG
10 TABLET ORAL DAILY PRN
Qty: 10 TABLET | Refills: 5 | Status: SHIPPED | OUTPATIENT
Start: 2023-09-05 | End: 2024-02-08 | Stop reason: SDUPTHER

## 2023-09-05 RX ORDER — BUTALBITAL, ACETAMINOPHEN AND CAFFEINE 50; 325; 40 MG/1; MG/1; MG/1
1 TABLET ORAL DAILY PRN
Qty: 10 TABLET | Refills: 5 | Status: SHIPPED | OUTPATIENT
Start: 2023-09-05 | End: 2024-02-08 | Stop reason: SDUPTHER

## 2023-09-05 RX ORDER — DULOXETIN HYDROCHLORIDE 30 MG/1
30 CAPSULE, DELAYED RELEASE ORAL DAILY
Qty: 90 CAPSULE | Refills: 1 | Status: SHIPPED | OUTPATIENT
Start: 2023-09-05 | End: 2023-11-14

## 2023-09-05 RX ORDER — METHOCARBAMOL 750 MG/1
750 TABLET, FILM COATED ORAL 3 TIMES DAILY PRN
Qty: 270 TABLET | Refills: 1 | Status: SHIPPED | OUTPATIENT
Start: 2023-09-05 | End: 2024-02-08 | Stop reason: SDUPTHER

## 2023-09-05 RX ORDER — DULOXETIN HYDROCHLORIDE 60 MG/1
60 CAPSULE, DELAYED RELEASE ORAL DAILY
Qty: 90 CAPSULE | Refills: 1 | Status: SHIPPED | OUTPATIENT
Start: 2023-09-05 | End: 2023-11-14

## 2023-09-05 RX ORDER — TOPIRAMATE 50 MG/1
50 TABLET, FILM COATED ORAL 2 TIMES DAILY
Qty: 60 TABLET | Refills: 5 | Status: SHIPPED | OUTPATIENT
Start: 2023-09-05 | End: 2024-02-08

## 2023-09-05 NOTE — PROGRESS NOTES
HPI: Shelbie Santamaria is a 58 y.o. female with history of numbness in the feet and pain in the leg with polyneuropathy noted on EMG BLE 2019. History of borderline DM, as well as HTN, HLD, and insomnia. She is s/p right cataract removal.     She presents today for a follow up visit. Trial of Pamelor started at her last visit for migraine prevention, and this was ineffective.     Current headaches occur 4-5x per week. Duration is 4-several hours. Location is bitemporal/bifrontal. Stress does trigger her headaches. + photophobia, + phonophobia, nausea, but no focal weakness, numbness/tingling, dizziness. Laying in a dark room and taking Fioricet or Compazine is helpful. She does limit her Fioricet. Denies neck pain.     She still has a great deal of home stress. She has an adult handicapped son, and now her five year old autistic grandson is living with her. Neuropathy and depression are still helped with Cymbalta.     She occasionally takes Lunesta. She is not sleep shopping currently.     Her leg cramping continues to be helped with prn Robaxin. This is improved lately.     She sees Dermatology for dyshidrotic eczema to her hands, which is improved currently.     Review of Systems   Constitutional:  Negative for fever.   HENT:  Negative for nosebleeds.    Eyes:  Negative for double vision.   Respiratory:  Negative for hemoptysis.    Cardiovascular:  Negative for leg swelling.   Genitourinary:  Negative for hematuria.   Musculoskeletal:  Negative for back pain.   Skin:  Negative for rash.   Neurological:  Positive for sensory change and headaches. Negative for speech change.   Endo/Heme/Allergies:  Does not bruise/bleed easily.   Psychiatric/Behavioral:  Negative for depression. The patient is nervous/anxious and has insomnia.        I have reviewed all of this patient's past medical and surgical histories as well as family and social histories and active allergies and medications as documented in the  electronic medical record.    Exam:  Gen Appearance, well developed/nourished in no apparent distress  CV: 2+ distal pulses with no edema or swelling  Neuro:  MS: Awake, alert, oriented to place, person, time, situation. Sustains attention. Recent/remote memory intact, Language is full to spontaneous speech/repetition/naming/comprehension. Fund of Knowledge is full  CN: PERRL, Extraoccular movements and visual fields are full. Normal facial sensation and strength, Hearing symmetric, Tongue and Palate are midline and strong. Shoulder Shrug symmetric and strong.  Motor: Normal bulk, tone, no abnormal movements. 5/5 strength bilateral upper/lower extremities with 1+ reflexes and bilateral plantar response  Sensory: symmetric to light touch, pain, temp, and vibration except reduced in the feet to all modalities  Romberg negative  Cerebellar: Finger-nose,Heal-shin, Rapid alternating movements intact  Gait: Normal stance, no ataxia  Skin: eczema noted to hands    Assessment/Plan: Shelbie Santamaria is a 58 y.o. female with history of numbness in the feet and pain in the leg with polyneuropathy noted on EMG BLE 2019. History of borderline DM, as well as HTN, HLD, and insomnia.     I recommend;   1. Continue Cymbalta 90 mg for neuropathy and ANASTACIO complaints.   -she had somnolence with multiple antidepressants prior, but tolerates Cymbalta well.     -Leg cramping no worse on Cymbalta, which can aggravate RLS type complaints.     -Gabapentin provided mild relief, but resulted in depression for a time-would not increase further.     2. Rx compound cream aggravated her hand eczema prior.     3. Her neuropathic symptoms are mildly improved with stopping Nexium to date and she will continue off of this med. Nexium use noted for prior C. Diff infection per her. These meds have been possibly implicated in Neuropathy.     4. ABIs unremarkable and she states Venous US with PCP was negative (that result was never sent).     5.  Neuropathy labs showed elevated fasting glucose.This is the likely cause of her mild neuropathy. Discussed diet/ weight loss for improvement and to prevent worsening    6. Patient is a former smoker-quit in 2018.    7. Robaxin helps with some her muscle cramps in the left leg. Continue this medication.     8. Continue prn Fioricet and Compazine to abort migraines. Limit abortive agent use to no more than 2 days in a row/3 days per week max.   -Nurtec not covered by insurance.     Toradol 30 mg IM for acute migraine was helpful prior. May repeat in clinic prn.     Unclear of effect of Imitrex.     9. Trial of Topamax at lower dose. She experienced sedation when taking this many years ago for migraine, but I suspect that she was taking a much higher dose.   -notify me with any mood issues or excess sedation.   -there is a low risk of serotonin syndrome with concurrent use of Cymbalta and Pamelor.   -She has tried and failed Aimovig, Botox, Topamax, Pamelor, Elavil, Metoprolol, Prozac, and Cymbalta.   -Emgality not approved by her insurance.   -Advised to keep headache log until her next visit.     -No need for neuroimaging at this time, unless she fails to improve.     -no sulfa allergy or history or renal stones. No history of asthma.     -notify me with any worsening of migraines.     10. Former tobacco use.     11. She sees Dermatology for her chronic severe bilateral hand dermatitis    FU 3 months

## 2023-09-05 NOTE — PATIENT INSTRUCTIONS
To start topiramate, take 1/2 tablet (25 mg) twice a day for two weeks, then increase to 1 tablet (50 mg) twice a day afterwards.     Keep a headache log until your next visit.

## 2023-11-14 DIAGNOSIS — G62.9 POLYNEUROPATHY: ICD-10-CM

## 2023-11-14 RX ORDER — DULOXETIN HYDROCHLORIDE 60 MG/1
60 CAPSULE, DELAYED RELEASE ORAL DAILY
Qty: 90 CAPSULE | Refills: 1 | Status: SHIPPED | OUTPATIENT
Start: 2023-11-14 | End: 2024-02-08 | Stop reason: SDUPTHER

## 2023-11-14 RX ORDER — DULOXETIN HYDROCHLORIDE 30 MG/1
30 CAPSULE, DELAYED RELEASE ORAL DAILY
Qty: 90 CAPSULE | Refills: 1 | Status: SHIPPED | OUTPATIENT
Start: 2023-11-14 | End: 2024-02-08 | Stop reason: SDUPTHER

## 2024-02-08 ENCOUNTER — OFFICE VISIT (OUTPATIENT)
Dept: NEUROLOGY | Facility: CLINIC | Age: 59
End: 2024-02-08
Payer: COMMERCIAL

## 2024-02-08 VITALS
HEIGHT: 66 IN | HEART RATE: 74 BPM | RESPIRATION RATE: 20 BRPM | BODY MASS INDEX: 38.27 KG/M2 | SYSTOLIC BLOOD PRESSURE: 128 MMHG | WEIGHT: 238.13 LBS | OXYGEN SATURATION: 100 % | DIASTOLIC BLOOD PRESSURE: 84 MMHG

## 2024-02-08 DIAGNOSIS — G43.709 CHRONIC MIGRAINE WITHOUT AURA WITHOUT STATUS MIGRAINOSUS, NOT INTRACTABLE: Primary | ICD-10-CM

## 2024-02-08 DIAGNOSIS — I10 HYPERTENSION, UNSPECIFIED TYPE: ICD-10-CM

## 2024-02-08 DIAGNOSIS — R25.2 LEG CRAMPING: ICD-10-CM

## 2024-02-08 DIAGNOSIS — E78.49 OTHER HYPERLIPIDEMIA: ICD-10-CM

## 2024-02-08 DIAGNOSIS — G43.109 MIGRAINE WITH AURA AND WITHOUT STATUS MIGRAINOSUS, NOT INTRACTABLE: ICD-10-CM

## 2024-02-08 DIAGNOSIS — G62.9 POLYNEUROPATHY: ICD-10-CM

## 2024-02-08 DIAGNOSIS — R73.03 PREDIABETES: ICD-10-CM

## 2024-02-08 PROCEDURE — 3074F SYST BP LT 130 MM HG: CPT | Mod: CPTII,S$GLB,, | Performed by: NURSE PRACTITIONER

## 2024-02-08 PROCEDURE — 1159F MED LIST DOCD IN RCRD: CPT | Mod: CPTII,S$GLB,, | Performed by: NURSE PRACTITIONER

## 2024-02-08 PROCEDURE — 3079F DIAST BP 80-89 MM HG: CPT | Mod: CPTII,S$GLB,, | Performed by: NURSE PRACTITIONER

## 2024-02-08 PROCEDURE — 99214 OFFICE O/P EST MOD 30 MIN: CPT | Mod: S$GLB,,, | Performed by: NURSE PRACTITIONER

## 2024-02-08 PROCEDURE — 99999 PR PBB SHADOW E&M-EST. PATIENT-LVL III: CPT | Mod: PBBFAC,,, | Performed by: NURSE PRACTITIONER

## 2024-02-08 PROCEDURE — 3008F BODY MASS INDEX DOCD: CPT | Mod: CPTII,S$GLB,, | Performed by: NURSE PRACTITIONER

## 2024-02-08 RX ORDER — ATOGEPANT 60 MG/1
60 TABLET ORAL DAILY
Qty: 30 TABLET | Refills: 5 | Status: SHIPPED | OUTPATIENT
Start: 2024-02-08

## 2024-02-08 RX ORDER — DULOXETIN HYDROCHLORIDE 30 MG/1
30 CAPSULE, DELAYED RELEASE ORAL DAILY
Qty: 90 CAPSULE | Refills: 1 | Status: SHIPPED | OUTPATIENT
Start: 2024-02-08 | End: 2025-02-07

## 2024-02-08 RX ORDER — DULOXETIN HYDROCHLORIDE 60 MG/1
60 CAPSULE, DELAYED RELEASE ORAL DAILY
Qty: 90 CAPSULE | Refills: 1 | Status: SHIPPED | OUTPATIENT
Start: 2024-02-08 | End: 2025-02-07

## 2024-02-08 RX ORDER — PROCHLORPERAZINE MALEATE 10 MG
10 TABLET ORAL DAILY PRN
Qty: 10 TABLET | Refills: 5 | Status: SHIPPED | OUTPATIENT
Start: 2024-02-08

## 2024-02-08 RX ORDER — METHOCARBAMOL 750 MG/1
750 TABLET, FILM COATED ORAL 3 TIMES DAILY PRN
Qty: 270 TABLET | Refills: 1 | Status: SHIPPED | OUTPATIENT
Start: 2024-02-08

## 2024-02-08 RX ORDER — BUTALBITAL, ACETAMINOPHEN AND CAFFEINE 50; 325; 40 MG/1; MG/1; MG/1
1 TABLET ORAL DAILY PRN
Qty: 10 TABLET | Refills: 5 | Status: SHIPPED | OUTPATIENT
Start: 2024-02-08

## 2024-02-08 RX ORDER — BUTALBITAL, ACETAMINOPHEN AND CAFFEINE 50; 325; 40 MG/1; MG/1; MG/1
1 TABLET ORAL DAILY PRN
Qty: 10 TABLET | Refills: 5 | Status: SHIPPED | OUTPATIENT
Start: 2024-02-08 | End: 2024-02-08 | Stop reason: SDUPTHER

## 2024-02-08 NOTE — PROGRESS NOTES
HPI: Shelbie Santamaria is a 59 y.o. female with history of numbness in the feet and pain in the leg with polyneuropathy noted on EMG BLE 2019. History of borderline DM, as well as HTN, HLD, and insomnia. She is s/p right cataract removal.     She presents today for a follow up visit. Topamax trial started at her last visit for migraine prevention. She was unable to tolerate this at even the lowest dose.     Headaches are the same in quality. Current headaches occur 4-5x per week. Duration is 4-several hours. Location is bitemporal/bifrontal. Stress does trigger her headaches. + photophobia, + phonophobia, nausea, but no focal weakness, numbness/tingling, dizziness. Laying in a dark room and taking Fioricet or Compazine is helpful. She does limit her Fioricet. Denies neck pain.     She still has a great deal of home stress. She has an adult handicapped son, and now her five year old autistic grandson is living with her. Neuropathy and depression are still helped with Cymbalta.     She is sleeping better at night now. She is taking Magnesium, and she feels that this can be helpful.     Her leg cramping continues to be helped with prn Robaxin. This is improved lately.     She sees Dermatology for dyshidrotic eczema to her hands, which is improved currently.     Review of Systems   Constitutional:  Negative for fever.   HENT:  Negative for nosebleeds.    Eyes:  Negative for double vision.   Respiratory:  Negative for hemoptysis.    Cardiovascular:  Negative for leg swelling.   Genitourinary:  Negative for hematuria.   Musculoskeletal:  Negative for back pain.   Skin:  Negative for rash.   Neurological:  Positive for sensory change and headaches. Negative for speech change.   Endo/Heme/Allergies:  Does not bruise/bleed easily.   Psychiatric/Behavioral:  Negative for depression. The patient has insomnia. The patient is not nervous/anxious.        I have reviewed all of this patient's past medical and surgical  histories as well as family and social histories and active allergies and medications as documented in the electronic medical record.    Exam:  Gen Appearance, well developed/nourished in no apparent distress  CV: 2+ distal pulses with no edema or swelling  Neuro:  MS: Awake, alert, oriented to place, person, time, situation. Sustains attention. Recent/remote memory intact, Language is full to spontaneous speech/repetition/naming/comprehension. Fund of Knowledge is full  CN: PERRL, Extraoccular movements and visual fields are full. Normal facial sensation and strength, Hearing symmetric, Tongue and Palate are midline and strong. Shoulder Shrug symmetric and strong.  Motor: Normal bulk, tone, no abnormal movements. 5/5 strength bilateral upper/lower extremities with 1+ reflexes and bilateral plantar response  Sensory: symmetric to light touch, pain, temp, and vibration except reduced in the feet to all modalities  Romberg negative  Cerebellar: Finger-nose,Heal-shin, Rapid alternating movements intact  Gait: Normal stance, no ataxia  Skin: eczema noted to hands    Assessment/Plan: Shelbie Santamaria is a 59 y.o. female with history of numbness in the feet and pain in the leg with polyneuropathy noted on EMG BLE 2019. History of borderline DM, as well as HTN, HLD, and insomnia.     I recommend;   1. Continue Cymbalta 90 mg for neuropathy and ANASTACIO complaints.   -she had somnolence with multiple antidepressants prior, but tolerates Cymbalta well.     -Leg cramping no worse on Cymbalta, which can aggravate RLS type complaints.     -Gabapentin provided mild relief, but resulted in depression for a time-would not increase further.     2. Rx compound cream aggravated her hand eczema prior.     3. Her neuropathic symptoms are mildly improved with stopping Nexium to date and she will continue off of this med. Nexium use noted for prior C. Diff infection per her. These meds have been possibly implicated in Neuropathy.      4. ABIs unremarkable and she states Venous US with PCP was negative (that result was never sent).     5. Neuropathy labs showed elevated fasting glucose.This is the likely cause of her mild neuropathy. Discussed diet/ weight loss for improvement and to prevent worsening    6. Patient is a former smoker-quit in 2018.    7. Robaxin helps with some her muscle cramps in the left leg. Continue this medication.     8. Continue prn Fioricet and Compazine to abort migraines. Limit abortive agent use to no more than 2 days in a row/3 days per week max.   -Nurtec not covered by insurance.     Toradol 30 mg IM for acute migraine was helpful prior. May repeat in clinic prn.     Unclear of effect of Imitrex.     9. Start Qulipta for migraine prevention. She has greater than 15 migraine days per month.     -She is taking Cymbalta.   -She has tried and failed Aimovig, Botox, Topamax (was also poorly tolerated), Pamelor, Elavil, Metoprolol, Prozac, and Cymbalta.   -Emgality not approved by her insurance.   -Advised to keep headache log until her next visit.     -remote history of possible renal stones as a child.   -no sulfa allergy. No history of asthma.     -No need for neuroimaging at this time, unless she fails to improve.     -notify me with any worsening of migraines.     10. Former tobacco use.     11. She sees Dermatology for her chronic severe bilateral hand dermatitis    FU 3 months

## 2024-05-07 ENCOUNTER — PATIENT MESSAGE (OUTPATIENT)
Dept: NEUROLOGY | Facility: CLINIC | Age: 59
End: 2024-05-07

## 2025-04-29 ENCOUNTER — TELEPHONE (OUTPATIENT)
Dept: OBSTETRICS AND GYNECOLOGY | Facility: CLINIC | Age: 60
End: 2025-04-29

## 2025-04-29 ENCOUNTER — OFFICE VISIT (OUTPATIENT)
Dept: OBSTETRICS AND GYNECOLOGY | Facility: CLINIC | Age: 60
End: 2025-04-29
Payer: COMMERCIAL

## 2025-04-29 ENCOUNTER — TELEPHONE (OUTPATIENT)
Dept: OBSTETRICS AND GYNECOLOGY | Facility: CLINIC | Age: 60
End: 2025-04-29
Payer: COMMERCIAL

## 2025-04-29 VITALS
HEIGHT: 66 IN | WEIGHT: 184.38 LBS | DIASTOLIC BLOOD PRESSURE: 76 MMHG | BODY MASS INDEX: 29.63 KG/M2 | SYSTOLIC BLOOD PRESSURE: 114 MMHG | HEART RATE: 73 BPM

## 2025-04-29 DIAGNOSIS — R30.0 DYSURIA: ICD-10-CM

## 2025-04-29 DIAGNOSIS — N90.5 ATROPHY OF VULVA: Primary | ICD-10-CM

## 2025-04-29 DIAGNOSIS — N76.0 ACUTE VAGINITIS: ICD-10-CM

## 2025-04-29 LAB
BILIRUB SERPL-MCNC: NORMAL MG/DL
BLOOD URINE, POC: NORMAL
CLARITY, POC UA: CLEAR
COLOR, POC UA: NORMAL
GLUCOSE UR QL STRIP: NORMAL
KETONES UR QL STRIP: NORMAL
LEUKOCYTE ESTERASE URINE, POC: NORMAL
NITRITE, POC UA: NORMAL
PH, POC UA: 6
PROTEIN, POC: NORMAL
SPECIFIC GRAVITY, POC UA: 1.01
UROBILINOGEN, POC UA: 0.2

## 2025-04-29 PROCEDURE — 87086 URINE CULTURE/COLONY COUNT: CPT | Performed by: OBSTETRICS & GYNECOLOGY

## 2025-04-29 PROCEDURE — 81515 NFCT DS BV&VAGINITIS DNA ALG: CPT | Performed by: OBSTETRICS & GYNECOLOGY

## 2025-04-29 PROCEDURE — 99999 PR PBB SHADOW E&M-EST. PATIENT-LVL III: CPT | Mod: PBBFAC,,, | Performed by: OBSTETRICS & GYNECOLOGY

## 2025-04-29 RX ORDER — CLOBETASOL PROPIONATE 0.5 MG/G
OINTMENT TOPICAL 2 TIMES DAILY
Qty: 45 G | Refills: 0 | Status: SHIPPED | OUTPATIENT
Start: 2025-04-29

## 2025-04-29 RX ORDER — ESZOPICLONE 3 MG/1
3 TABLET, FILM COATED ORAL NIGHTLY
COMMUNITY

## 2025-04-29 RX ORDER — ESTRADIOL 0.1 MG/G
1 CREAM VAGINAL NIGHTLY
Qty: 42.5 G | Refills: 0 | Status: SHIPPED | OUTPATIENT
Start: 2025-04-29 | End: 2026-04-29

## 2025-04-29 NOTE — TELEPHONE ENCOUNTER
----- Message from Liudmila sent at 4/29/2025  1:29 PM CDT -----  Contact: Self  Shelbie Bianchi RosalioN: [id number] Phone      [phone] Phone      Not on file.Mobile          [phone] Care Team:Sunny Alexandre, NP as PCP - General (Family Medicine)OB? NoWhat phone number can you be reached at? [phone]: pt states she is having some bleeding with pain. States there is also an abnormal discharge. 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

## 2025-04-29 NOTE — PROGRESS NOTES
"  Subjective:       Patient ID: Shelbie Santamaria is a 60 y.o. female.    Chief Complaint:  Pelvic Pain, Flank Pain, Vaginal Discharge, Urinary Urgency, PELVIC PRESSURE, and Vaginal Pain (/)      History of Present Illness  Pelvic Pain  The patient's primary symptoms include pelvic pain and vaginal discharge. Associated symptoms include abdominal pain, chills, constipation, dysuria, flank pain, frequency, headaches, hematuria and urgency. Pertinent negatives include no back pain, diarrhea, fever, nausea, rash, sore throat or vomiting.   Flank Pain  Associated symptoms include abdominal pain, dysuria, headaches, pelvic pain and hematuria. Pertinent negatives include no chest pain, fever, numbness or weakness.   Vaginal Discharge  The patient's primary symptoms include pelvic pain and vaginal discharge. Associated symptoms include abdominal pain, chills, constipation, dysuria, flank pain, frequency, headaches, hematuria and urgency. Pertinent negatives include no back pain, diarrhea, fever, nausea, rash, sore throat or vomiting.   Vaginal Pain  The patient's primary symptoms include pelvic pain and vaginal discharge. Associated symptoms include abdominal pain, chills, constipation, dysuria, flank pain, frequency, headaches, hematuria and urgency. Pertinent negatives include no back pain, diarrhea, fever, nausea, rash, sore throat or vomiting.     Pt complains of pelvic pain, flank pain.  She was treated for a UTI twice with her PCP over the past 2 months.    She reports that she has acute onset redness and severe pain like she "had battery acid" on the vagina.    She had severe dyspareunia and had to stop.     She also had tablespoon amount of clear brown discharge.    Denies recent trauma to the area.    She has a long h/o leaking with cough/urgency.     She has a h/o MI/BSO for endometriosis/pain.    GYN & OB History  No LMP recorded. Patient has had a hysterectomy.   Date of Last Pap: No result found    OB " History    Para Term  AB Living   1 1 1      SAB IAB Ectopic Multiple Live Births             # Outcome Date GA Lbr Leonid/2nd Weight Sex Type Anes PTL Lv   1 Term     M CS-LTranv          Review of Systems  Review of Systems   Constitutional:  Positive for appetite change, chills and fatigue. Negative for diaphoresis, fever and unexpected weight change.   HENT:  Positive for rhinorrhea and trouble swallowing. Negative for congestion, hearing loss, postnasal drip, sinus pressure, sinus pain, sore throat and tinnitus.    Eyes:  Negative for pain, discharge and visual disturbance.   Respiratory:  Negative for apnea, cough, shortness of breath and wheezing.    Cardiovascular:  Negative for chest pain, palpitations and leg swelling.   Gastrointestinal:  Positive for abdominal pain and constipation. Negative for diarrhea, nausea and vomiting.   Endocrine: Negative for cold intolerance, heat intolerance, polydipsia, polyphagia and polyuria.   Genitourinary:  Positive for difficulty urinating, dyspareunia, dysuria, enuresis, flank pain, frequency, hematuria, pelvic pain, urgency, vaginal bleeding, vaginal discharge and vaginal pain. Negative for genital sores and menstrual problem.   Musculoskeletal:  Positive for arthralgias. Negative for back pain, joint swelling, myalgias, neck pain and neck stiffness.   Skin:  Negative for color change, pallor and rash.   Allergic/Immunologic: Negative for environmental allergies, food allergies and immunocompromised state.   Neurological:  Positive for headaches. Negative for dizziness, weakness, light-headedness and numbness.   Hematological:  Negative for adenopathy. Bruises/bleeds easily.   Psychiatric/Behavioral:  Negative for agitation and confusion. The patient is nervous/anxious.            Objective:    Physical Exam:   Constitutional: She is oriented to person, place, and time. She appears well-developed and well-nourished. No distress.    HENT:   Head:  Normocephalic and atraumatic.    Eyes: Conjunctivae and EOM are normal.      Pulmonary/Chest: Effort normal. No respiratory distress.          Genitourinary:                 Musculoskeletal: Normal range of motion.       Neurological: She is alert and oriented to person, place, and time.    Skin: Skin is warm and dry.    Psychiatric: She has a normal mood and affect. Her behavior is normal. Judgment and thought content normal.          Assessment:        1. Atrophy of vulva    2. Dysuria    3. Acute vaginitis               Plan:      Shelbie was seen today for pelvic pain, flank pain, vaginal discharge, urinary urgency, pelvic pressure and vaginal pain.    Diagnoses and all orders for this visit:    Atrophy of vulva  -     clobetasol 0.05% (TEMOVATE) 0.05 % Oint; Apply topically 2 (two) times daily.  -     estradioL (ESTRACE) 0.01 % (0.1 mg/gram) vaginal cream; Place 1 g vaginally nightly.    Dysuria  -     POCT URINE DIPSTICK WITHOUT MICROSCOPE  -     Urine Culture High Risk    Acute vaginitis  -     Vaginosis Screen by DNA Probe; Future  -     Vaginosis Screen by DNA Probe      Follow up in 4 weeks after topical treatment as discussed. If not improved, will plan for biopsy at appointment. Patient agrees with plan.

## 2025-05-01 LAB — BACTERIA UR CULT: NORMAL

## 2025-05-02 ENCOUNTER — RESULTS FOLLOW-UP (OUTPATIENT)
Dept: OBSTETRICS AND GYNECOLOGY | Facility: CLINIC | Age: 60
End: 2025-05-02

## 2025-05-02 LAB
BACTERIAL VAGINOSIS DNA (OHS): NOT DETECTED
CANDIDA GLABRATA/KRUSEI DNA (OHS): NOT DETECTED
CANDIDA SPECIES DNA (OHS): NOT DETECTED
TRICHOMONAS VAGINALIS DNA (OHS): NOT DETECTED

## 2025-05-28 ENCOUNTER — OFFICE VISIT (OUTPATIENT)
Dept: OBSTETRICS AND GYNECOLOGY | Facility: CLINIC | Age: 60
End: 2025-05-28
Payer: COMMERCIAL

## 2025-05-28 VITALS
BODY MASS INDEX: 28.99 KG/M2 | HEIGHT: 66 IN | WEIGHT: 180.38 LBS | DIASTOLIC BLOOD PRESSURE: 80 MMHG | SYSTOLIC BLOOD PRESSURE: 106 MMHG | HEART RATE: 79 BPM

## 2025-05-28 DIAGNOSIS — N90.5 ATROPHY OF VULVA: ICD-10-CM

## 2025-05-28 PROCEDURE — 99213 OFFICE O/P EST LOW 20 MIN: CPT | Mod: S$GLB,,, | Performed by: OBSTETRICS & GYNECOLOGY

## 2025-05-28 PROCEDURE — 99999 PR PBB SHADOW E&M-EST. PATIENT-LVL III: CPT | Mod: PBBFAC,,, | Performed by: OBSTETRICS & GYNECOLOGY

## 2025-05-28 PROCEDURE — 3074F SYST BP LT 130 MM HG: CPT | Mod: CPTII,S$GLB,, | Performed by: OBSTETRICS & GYNECOLOGY

## 2025-05-28 PROCEDURE — 3008F BODY MASS INDEX DOCD: CPT | Mod: CPTII,S$GLB,, | Performed by: OBSTETRICS & GYNECOLOGY

## 2025-05-28 PROCEDURE — 3079F DIAST BP 80-89 MM HG: CPT | Mod: CPTII,S$GLB,, | Performed by: OBSTETRICS & GYNECOLOGY

## 2025-05-28 PROCEDURE — 1159F MED LIST DOCD IN RCRD: CPT | Mod: CPTII,S$GLB,, | Performed by: OBSTETRICS & GYNECOLOGY

## 2025-05-28 RX ORDER — ESTRADIOL 0.1 MG/G
1 CREAM VAGINAL
Qty: 42.5 G | Refills: 2 | Status: SHIPPED | OUTPATIENT
Start: 2025-05-29 | End: 2026-05-29

## 2025-05-28 NOTE — PROGRESS NOTES
Subjective:       Patient ID: Shelbie Santamaria is a 60 y.o. female.    Chief Complaint:  Follow-up (Vulva atrophy, pt states she is a lot better)      History of Present Illness  Follow-up  Pertinent negatives include no abdominal pain, arthralgias, chest pain, chills, congestion, coughing, diaphoresis, fatigue, fever, headaches, joint swelling, myalgias, nausea, neck pain, numbness, rash, sore throat, vomiting or weakness.     Pt is here today for follow up of vulvar lesion.  She reports resolution of lesion with estrogen and steroid topical treatment.  No complaints.    GYN & OB History  No LMP recorded. Patient has had a hysterectomy.   Date of Last Pap: No result found    OB History    Para Term  AB Living   1 1 1      SAB IAB Ectopic Multiple Live Births             # Outcome Date GA Lbr Leonid/2nd Weight Sex Type Anes PTL Lv   1 Term     M CS-LTranv          Review of Systems  Review of Systems   Constitutional:  Negative for chills, diaphoresis, fatigue, fever and unexpected weight change.   HENT:  Negative for congestion, hearing loss, postnasal drip, rhinorrhea, sinus pressure, sinus pain, sore throat and tinnitus.    Eyes:  Negative for pain, discharge and visual disturbance.   Respiratory:  Negative for apnea, cough, shortness of breath and wheezing.    Cardiovascular:  Negative for chest pain, palpitations and leg swelling.   Gastrointestinal:  Negative for abdominal pain, constipation, diarrhea, nausea and vomiting.   Endocrine: Negative for cold intolerance, heat intolerance, polydipsia, polyphagia and polyuria.   Genitourinary:  Negative for difficulty urinating, dyspareunia, dysuria, enuresis, flank pain, frequency, genital sores, hematuria, menstrual problem, pelvic pain, urgency, vaginal bleeding, vaginal discharge and vaginal pain.   Musculoskeletal:  Negative for arthralgias, back pain, joint swelling, myalgias, neck pain and neck stiffness.   Skin:  Negative for color change,  pallor and rash.   Allergic/Immunologic: Negative for environmental allergies, food allergies and immunocompromised state.   Neurological:  Negative for dizziness, weakness, light-headedness, numbness and headaches.   Hematological:  Negative for adenopathy. Does not bruise/bleed easily.   Psychiatric/Behavioral:  Negative for agitation and confusion. The patient is not nervous/anxious.            Objective:    Physical Exam:   Constitutional: She is oriented to person, place, and time. She appears well-developed and well-nourished. No distress.    HENT:   Head: Normocephalic and atraumatic.    Eyes: Conjunctivae and EOM are normal.      Pulmonary/Chest: Effort normal. No respiratory distress.          Genitourinary: The external female genitalia was normal.   Vaginal atrophy noted.    Genitourinary Comments: Previously seen lesion healed             Musculoskeletal: Normal range of motion.       Neurological: She is alert and oriented to person, place, and time.    Skin: Skin is warm and dry.    Psychiatric: She has a normal mood and affect. Her behavior is normal. Judgment and thought content normal.          Assessment:        1. Atrophy of vulva              Plan:      Shelbie was seen today for follow-up.    Diagnoses and all orders for this visit:    Atrophy of vulva  -     estradioL (ESTRACE) 0.01 % (0.1 mg/gram) vaginal cream; Place 1 g vaginally twice a week.    Resolution of previously seen lesion.    Now just use estrace twice weekly.

## 2025-07-07 ENCOUNTER — HOSPITAL ENCOUNTER (EMERGENCY)
Facility: HOSPITAL | Age: 60
Discharge: ANOTHER HOSPITAL | End: 2025-07-07
Attending: STUDENT IN AN ORGANIZED HEALTH CARE EDUCATION/TRAINING PROGRAM
Payer: COMMERCIAL

## 2025-07-07 ENCOUNTER — HOSPITAL ENCOUNTER (INPATIENT)
Facility: HOSPITAL | Age: 60
LOS: 9 days | Discharge: HOME | DRG: 269 | End: 2025-07-16
Attending: STUDENT IN AN ORGANIZED HEALTH CARE EDUCATION/TRAINING PROGRAM | Admitting: INTERNAL MEDICINE
Payer: COMMERCIAL

## 2025-07-07 VITALS
TEMPERATURE: 98 F | RESPIRATION RATE: 18 BRPM | BODY MASS INDEX: 28.28 KG/M2 | WEIGHT: 176 LBS | SYSTOLIC BLOOD PRESSURE: 126 MMHG | HEIGHT: 66 IN | OXYGEN SATURATION: 100 % | DIASTOLIC BLOOD PRESSURE: 59 MMHG | HEART RATE: 84 BPM

## 2025-07-07 DIAGNOSIS — R10.13 EPIGASTRIC ABDOMINAL PAIN: ICD-10-CM

## 2025-07-07 DIAGNOSIS — I71.00 AORTIC DISSECTION: ICD-10-CM

## 2025-07-07 DIAGNOSIS — I71.012 DISSECTION OF DESCENDING THORACIC AORTA: Primary | ICD-10-CM

## 2025-07-07 DIAGNOSIS — R10.9 ABDOMINAL PAIN: ICD-10-CM

## 2025-07-07 PROBLEM — K29.80 DUODENITIS: Status: ACTIVE | Noted: 2025-07-07

## 2025-07-07 LAB
ABSOLUTE EOSINOPHIL (OHS): 0 K/UL
ABSOLUTE EOSINOPHIL (OHS): 0.08 K/UL
ABSOLUTE MONOCYTE (OHS): 0.39 K/UL (ref 0.3–1)
ABSOLUTE MONOCYTE (OHS): 0.69 K/UL (ref 0.3–1)
ABSOLUTE NEUTROPHIL COUNT (OHS): 12.16 K/UL (ref 1.8–7.7)
ABSOLUTE NEUTROPHIL COUNT (OHS): 6.04 K/UL (ref 1.8–7.7)
ALBUMIN SERPL BCP-MCNC: 4.4 G/DL (ref 3.5–5.2)
ALBUMIN SERPL BCP-MCNC: 4.5 G/DL (ref 3.5–5.2)
ALP SERPL-CCNC: 39 UNIT/L (ref 40–150)
ALP SERPL-CCNC: 41 UNIT/L (ref 40–150)
ALT SERPL W/O P-5'-P-CCNC: 13 UNIT/L (ref 10–44)
ALT SERPL W/O P-5'-P-CCNC: 15 UNIT/L (ref 10–44)
AMPHET UR QL SCN: NEGATIVE
ANION GAP (OHS): 13 MMOL/L (ref 8–16)
ANION GAP (OHS): 15 MMOL/L (ref 8–16)
APTT PPP: 24.5 SECONDS (ref 21–32)
APTT PPP: 26.2 SECONDS (ref 21–32)
AST SERPL-CCNC: 19 UNIT/L (ref 11–45)
AST SERPL-CCNC: 27 UNIT/L (ref 11–45)
BARBITURATE SCN PRESENT UR: NEGATIVE
BASOPHILS # BLD AUTO: 0.02 K/UL
BASOPHILS # BLD AUTO: 0.02 K/UL
BASOPHILS NFR BLD AUTO: 0.1 %
BASOPHILS NFR BLD AUTO: 0.2 %
BENZODIAZ UR QL SCN: NEGATIVE
BILIRUB SERPL-MCNC: 0.6 MG/DL (ref 0.1–1)
BILIRUB SERPL-MCNC: 0.6 MG/DL (ref 0.1–1)
BILIRUB UR QL STRIP.AUTO: NEGATIVE
BNP SERPL-MCNC: 149 PG/ML (ref 0–99)
BUN SERPL-MCNC: 16 MG/DL (ref 8–23)
BUN SERPL-MCNC: 20 MG/DL (ref 8–23)
CALCIUM SERPL-MCNC: 10.3 MG/DL (ref 8.7–10.5)
CALCIUM SERPL-MCNC: 9.6 MG/DL (ref 8.7–10.5)
CANNABINOIDS UR QL SCN: ABNORMAL
CHLORIDE SERPL-SCNC: 105 MMOL/L (ref 95–110)
CHLORIDE SERPL-SCNC: 107 MMOL/L (ref 95–110)
CK SERPL-CCNC: 69 U/L (ref 20–180)
CLARITY UR: CLEAR
CO2 SERPL-SCNC: 15 MMOL/L (ref 23–29)
CO2 SERPL-SCNC: 20 MMOL/L (ref 23–29)
COCAINE UR QL SCN: NEGATIVE
COLOR UR AUTO: YELLOW
CREAT SERPL-MCNC: 0.7 MG/DL (ref 0.5–1.4)
CREAT SERPL-MCNC: 1 MG/DL (ref 0.5–1.4)
CREAT UR-MCNC: 33.1 MG/DL (ref 15–325)
ERYTHROCYTE [DISTWIDTH] IN BLOOD BY AUTOMATED COUNT: 13.6 % (ref 11.5–14.5)
ERYTHROCYTE [DISTWIDTH] IN BLOOD BY AUTOMATED COUNT: 13.6 % (ref 11.5–14.5)
GFR SERPLBLD CREATININE-BSD FMLA CKD-EPI: >60 ML/MIN/1.73/M2
GFR SERPLBLD CREATININE-BSD FMLA CKD-EPI: >60 ML/MIN/1.73/M2
GLUCOSE SERPL-MCNC: 124 MG/DL (ref 70–110)
GLUCOSE SERPL-MCNC: 132 MG/DL (ref 70–110)
GLUCOSE UR QL STRIP: NEGATIVE
HCT VFR BLD AUTO: 37.9 % (ref 37–48.5)
HCT VFR BLD AUTO: 40.6 % (ref 37–48.5)
HGB BLD-MCNC: 12.9 GM/DL (ref 12–16)
HGB BLD-MCNC: 14 GM/DL (ref 12–16)
HGB UR QL STRIP: NEGATIVE
IMM GRANULOCYTES # BLD AUTO: 0.03 K/UL (ref 0–0.04)
IMM GRANULOCYTES # BLD AUTO: 0.04 K/UL (ref 0–0.04)
IMM GRANULOCYTES NFR BLD AUTO: 0.3 % (ref 0–0.5)
IMM GRANULOCYTES NFR BLD AUTO: 0.3 % (ref 0–0.5)
INDIRECT COOMBS: NORMAL
INDIRECT COOMBS: NORMAL
INR PPP: 1.1 (ref 0.8–1.2)
INR PPP: 1.1 (ref 0.8–1.2)
KETONES UR QL STRIP: NEGATIVE
LACTATE SERPL-SCNC: 0.9 MMOL/L (ref 0.5–2.2)
LEUKOCYTE ESTERASE UR QL STRIP: NEGATIVE
LIPASE SERPL-CCNC: 22 U/L (ref 4–60)
LYMPHOCYTES # BLD AUTO: 1.57 K/UL (ref 1–4.8)
LYMPHOCYTES # BLD AUTO: 4.32 K/UL (ref 1–4.8)
MAGNESIUM SERPL-MCNC: 1.9 MG/DL (ref 1.6–2.6)
MCH RBC QN AUTO: 29.1 PG (ref 27–31)
MCH RBC QN AUTO: 29.4 PG (ref 27–31)
MCHC RBC AUTO-ENTMCNC: 34 G/DL (ref 32–36)
MCHC RBC AUTO-ENTMCNC: 34.5 G/DL (ref 32–36)
MCV RBC AUTO: 85 FL (ref 82–98)
MCV RBC AUTO: 85 FL (ref 82–98)
METHADONE UR QL SCN: NEGATIVE
NITRITE UR QL STRIP: NEGATIVE
NUCLEATED RBC (/100WBC) (OHS): 0 /100 WBC
NUCLEATED RBC (/100WBC) (OHS): 0 /100 WBC
OPIATES UR QL SCN: ABNORMAL
PCP UR QL: NEGATIVE
PH UR STRIP: 8 [PH]
PLATELET # BLD AUTO: 327 K/UL (ref 150–450)
PLATELET # BLD AUTO: 347 K/UL (ref 150–450)
PMV BLD AUTO: 10.5 FL (ref 9.2–12.9)
PMV BLD AUTO: 9.8 FL (ref 9.2–12.9)
POTASSIUM SERPL-SCNC: 3.4 MMOL/L (ref 3.5–5.1)
POTASSIUM SERPL-SCNC: 3.8 MMOL/L (ref 3.5–5.1)
PROT SERPL-MCNC: 8.3 GM/DL (ref 6–8.4)
PROT SERPL-MCNC: 8.4 GM/DL (ref 6–8.4)
PROT UR QL STRIP: NEGATIVE
PROTHROMBIN TIME: 11.8 SECONDS (ref 9–12.5)
PROTHROMBIN TIME: 11.9 SECONDS (ref 9–12.5)
RBC # BLD AUTO: 4.44 M/UL (ref 4–5.4)
RBC # BLD AUTO: 4.77 M/UL (ref 4–5.4)
RELATIVE EOSINOPHIL (OHS): 0 %
RELATIVE EOSINOPHIL (OHS): 0.7 %
RELATIVE LYMPHOCYTE (OHS): 11.1 % (ref 18–48)
RELATIVE LYMPHOCYTE (OHS): 38.6 % (ref 18–48)
RELATIVE MONOCYTE (OHS): 2.8 % (ref 4–15)
RELATIVE MONOCYTE (OHS): 6.2 % (ref 4–15)
RELATIVE NEUTROPHIL (OHS): 54 % (ref 38–73)
RELATIVE NEUTROPHIL (OHS): 85.7 % (ref 38–73)
RH BLD: NORMAL
RH BLD: NORMAL
SODIUM SERPL-SCNC: 137 MMOL/L (ref 136–145)
SODIUM SERPL-SCNC: 138 MMOL/L (ref 136–145)
SP GR UR STRIP: 1.01
SPECIMEN OUTDATE: NORMAL
SPECIMEN OUTDATE: NORMAL
TROPONIN I SERPL DL<=0.01 NG/ML-MCNC: <0.006 NG/ML
TROPONIN I SERPL HS-MCNC: 9 NG/L
UROBILINOGEN UR STRIP-ACNC: NEGATIVE EU/DL
WBC # BLD AUTO: 11.18 K/UL (ref 3.9–12.7)
WBC # BLD AUTO: 14.18 K/UL (ref 3.9–12.7)

## 2025-07-07 PROCEDURE — 96368 THER/DIAG CONCURRENT INF: CPT

## 2025-07-07 PROCEDURE — 83880 ASSAY OF NATRIURETIC PEPTIDE: CPT

## 2025-07-07 PROCEDURE — 80053 COMPREHEN METABOLIC PANEL: CPT | Performed by: NURSE PRACTITIONER

## 2025-07-07 PROCEDURE — 96375 TX/PRO/DX INJ NEW DRUG ADDON: CPT

## 2025-07-07 PROCEDURE — 80053 COMPREHEN METABOLIC PANEL: CPT | Performed by: STUDENT IN AN ORGANIZED HEALTH CARE EDUCATION/TRAINING PROGRAM

## 2025-07-07 PROCEDURE — 84484 ASSAY OF TROPONIN QUANT: CPT

## 2025-07-07 PROCEDURE — 86900 BLOOD TYPING SEROLOGIC ABO: CPT | Performed by: STUDENT IN AN ORGANIZED HEALTH CARE EDUCATION/TRAINING PROGRAM

## 2025-07-07 PROCEDURE — 25500020 PHARM REV CODE 255: Performed by: STUDENT IN AN ORGANIZED HEALTH CARE EDUCATION/TRAINING PROGRAM

## 2025-07-07 PROCEDURE — 86901 BLOOD TYPING SEROLOGIC RH(D): CPT | Mod: 91 | Performed by: STUDENT IN AN ORGANIZED HEALTH CARE EDUCATION/TRAINING PROGRAM

## 2025-07-07 PROCEDURE — 83690 ASSAY OF LIPASE: CPT | Performed by: NURSE PRACTITIONER

## 2025-07-07 PROCEDURE — 96361 HYDRATE IV INFUSION ADD-ON: CPT

## 2025-07-07 PROCEDURE — 63600175 PHARM REV CODE 636 W HCPCS: Performed by: STUDENT IN AN ORGANIZED HEALTH CARE EDUCATION/TRAINING PROGRAM

## 2025-07-07 PROCEDURE — 99285 EMERGENCY DEPT VISIT HI MDM: CPT | Mod: 25

## 2025-07-07 PROCEDURE — 93010 ELECTROCARDIOGRAM REPORT: CPT | Mod: ,,, | Performed by: INTERNAL MEDICINE

## 2025-07-07 PROCEDURE — 81003 URINALYSIS AUTO W/O SCOPE: CPT | Performed by: NURSE PRACTITIONER

## 2025-07-07 PROCEDURE — 25000003 PHARM REV CODE 250: Performed by: NURSE PRACTITIONER

## 2025-07-07 PROCEDURE — 63600175 PHARM REV CODE 636 W HCPCS

## 2025-07-07 PROCEDURE — 96365 THER/PROPH/DIAG IV INF INIT: CPT

## 2025-07-07 PROCEDURE — 12000002 HC ACUTE/MED SURGE SEMI-PRIVATE ROOM

## 2025-07-07 PROCEDURE — 85025 COMPLETE CBC W/AUTO DIFF WBC: CPT | Performed by: NURSE PRACTITIONER

## 2025-07-07 PROCEDURE — 80307 DRUG TEST PRSMV CHEM ANLYZR: CPT | Performed by: NURSE PRACTITIONER

## 2025-07-07 PROCEDURE — 83735 ASSAY OF MAGNESIUM: CPT | Performed by: NURSE PRACTITIONER

## 2025-07-07 PROCEDURE — 83605 ASSAY OF LACTIC ACID: CPT

## 2025-07-07 PROCEDURE — 84484 ASSAY OF TROPONIN QUANT: CPT | Performed by: NURSE PRACTITIONER

## 2025-07-07 PROCEDURE — 85730 THROMBOPLASTIN TIME PARTIAL: CPT | Performed by: STUDENT IN AN ORGANIZED HEALTH CARE EDUCATION/TRAINING PROGRAM

## 2025-07-07 PROCEDURE — 82550 ASSAY OF CK (CPK): CPT | Performed by: NURSE PRACTITIONER

## 2025-07-07 PROCEDURE — 85025 COMPLETE CBC W/AUTO DIFF WBC: CPT | Performed by: STUDENT IN AN ORGANIZED HEALTH CARE EDUCATION/TRAINING PROGRAM

## 2025-07-07 PROCEDURE — 96376 TX/PRO/DX INJ SAME DRUG ADON: CPT

## 2025-07-07 PROCEDURE — 85610 PROTHROMBIN TIME: CPT | Performed by: STUDENT IN AN ORGANIZED HEALTH CARE EDUCATION/TRAINING PROGRAM

## 2025-07-07 PROCEDURE — 25000003 PHARM REV CODE 250

## 2025-07-07 PROCEDURE — 96366 THER/PROPH/DIAG IV INF ADDON: CPT

## 2025-07-07 PROCEDURE — 93005 ELECTROCARDIOGRAM TRACING: CPT

## 2025-07-07 PROCEDURE — 99223 1ST HOSP IP/OBS HIGH 75: CPT | Mod: ,,, | Performed by: STUDENT IN AN ORGANIZED HEALTH CARE EDUCATION/TRAINING PROGRAM

## 2025-07-07 PROCEDURE — 63600175 PHARM REV CODE 636 W HCPCS: Performed by: NURSE PRACTITIONER

## 2025-07-07 PROCEDURE — 99291 CRITICAL CARE FIRST HOUR: CPT

## 2025-07-07 PROCEDURE — 20000000 HC ICU ROOM

## 2025-07-07 RX ORDER — NICARDIPINE HYDROCHLORIDE 0.2 MG/ML
0-15 INJECTION INTRAVENOUS CONTINUOUS
Status: DISCONTINUED | OUTPATIENT
Start: 2025-07-07 | End: 2025-07-07

## 2025-07-07 RX ORDER — SODIUM CHLORIDE 9 MG/ML
1000 INJECTION, SOLUTION INTRAVENOUS
Status: COMPLETED | OUTPATIENT
Start: 2025-07-07 | End: 2025-07-07

## 2025-07-07 RX ORDER — HYDROMORPHONE HYDROCHLORIDE 1 MG/ML
0.5 INJECTION, SOLUTION INTRAMUSCULAR; INTRAVENOUS; SUBCUTANEOUS
Refills: 0 | Status: COMPLETED | OUTPATIENT
Start: 2025-07-07 | End: 2025-07-07

## 2025-07-07 RX ORDER — HYDROMORPHONE HYDROCHLORIDE 1 MG/ML
0.2 INJECTION, SOLUTION INTRAMUSCULAR; INTRAVENOUS; SUBCUTANEOUS EVERY 6 HOURS PRN
Status: DISCONTINUED | OUTPATIENT
Start: 2025-07-07 | End: 2025-07-08

## 2025-07-07 RX ORDER — CARVEDILOL 12.5 MG/1
12.5 TABLET ORAL EVERY 12 HOURS
Status: DISCONTINUED | OUTPATIENT
Start: 2025-07-07 | End: 2025-07-07

## 2025-07-07 RX ORDER — FENOFIBRATE 160 MG/1
160 TABLET ORAL DAILY
Status: DISCONTINUED | OUTPATIENT
Start: 2025-07-08 | End: 2025-07-16 | Stop reason: HOSPADM

## 2025-07-07 RX ORDER — ONDANSETRON HYDROCHLORIDE 2 MG/ML
4 INJECTION, SOLUTION INTRAVENOUS EVERY 6 HOURS PRN
Status: DISCONTINUED | OUTPATIENT
Start: 2025-07-07 | End: 2025-07-16 | Stop reason: HOSPADM

## 2025-07-07 RX ORDER — NICARDIPINE HYDROCHLORIDE 0.2 MG/ML
0-15 INJECTION INTRAVENOUS CONTINUOUS
Status: DISCONTINUED | OUTPATIENT
Start: 2025-07-07 | End: 2025-07-07 | Stop reason: HOSPADM

## 2025-07-07 RX ORDER — NAPROXEN SODIUM 220 MG/1
81 TABLET, FILM COATED ORAL DAILY
Status: DISCONTINUED | OUTPATIENT
Start: 2025-07-08 | End: 2025-07-16 | Stop reason: HOSPADM

## 2025-07-07 RX ORDER — AMLODIPINE BESYLATE 2.5 MG/1
5 TABLET ORAL DAILY
Status: DISCONTINUED | OUTPATIENT
Start: 2025-07-07 | End: 2025-07-08

## 2025-07-07 RX ORDER — EZETIMIBE 10 MG/1
10 TABLET ORAL NIGHTLY
Status: DISCONTINUED | OUTPATIENT
Start: 2025-07-08 | End: 2025-07-16 | Stop reason: HOSPADM

## 2025-07-07 RX ORDER — ONDANSETRON HYDROCHLORIDE 2 MG/ML
4 INJECTION, SOLUTION INTRAVENOUS
Status: COMPLETED | OUTPATIENT
Start: 2025-07-07 | End: 2025-07-07

## 2025-07-07 RX ORDER — PANTOPRAZOLE SODIUM 40 MG/10ML
40 INJECTION, POWDER, LYOPHILIZED, FOR SOLUTION INTRAVENOUS DAILY
Status: DISCONTINUED | OUTPATIENT
Start: 2025-07-07 | End: 2025-07-15

## 2025-07-07 RX ORDER — ESMOLOL HYDROCHLORIDE 20 MG/ML
0-300 INJECTION, SOLUTION INTRAVENOUS CONTINUOUS
Status: DISCONTINUED | OUTPATIENT
Start: 2025-07-07 | End: 2025-07-07 | Stop reason: HOSPADM

## 2025-07-07 RX ORDER — METOPROLOL TARTRATE 50 MG/1
50 TABLET ORAL 2 TIMES DAILY
Status: DISCONTINUED | OUTPATIENT
Start: 2025-07-07 | End: 2025-07-10

## 2025-07-07 RX ORDER — ALUMINUM HYDROXIDE, MAGNESIUM HYDROXIDE, AND SIMETHICONE 1200; 120; 1200 MG/30ML; MG/30ML; MG/30ML
30 SUSPENSION ORAL ONCE
Status: COMPLETED | OUTPATIENT
Start: 2025-07-07 | End: 2025-07-07

## 2025-07-07 RX ORDER — HYDROMORPHONE HYDROCHLORIDE 1 MG/ML
1 INJECTION, SOLUTION INTRAMUSCULAR; INTRAVENOUS; SUBCUTANEOUS
Refills: 0 | Status: COMPLETED | OUTPATIENT
Start: 2025-07-07 | End: 2025-07-07

## 2025-07-07 RX ORDER — ESMOLOL HYDROCHLORIDE 20 MG/ML
0-300 INJECTION, SOLUTION INTRAVENOUS CONTINUOUS
Status: DISCONTINUED | OUTPATIENT
Start: 2025-07-07 | End: 2025-07-08

## 2025-07-07 RX ORDER — AMLODIPINE BESYLATE 5 MG/1
5 TABLET ORAL DAILY
Status: DISCONTINUED | OUTPATIENT
Start: 2025-07-08 | End: 2025-07-07

## 2025-07-07 RX ORDER — OMEGA-3-ACID ETHYL ESTERS 1 G/1
1 CAPSULE, LIQUID FILLED ORAL DAILY
Status: DISCONTINUED | OUTPATIENT
Start: 2025-07-08 | End: 2025-07-10

## 2025-07-07 RX ORDER — METOCLOPRAMIDE HYDROCHLORIDE 5 MG/ML
10 INJECTION INTRAMUSCULAR; INTRAVENOUS
Status: COMPLETED | OUTPATIENT
Start: 2025-07-07 | End: 2025-07-07

## 2025-07-07 RX ORDER — LIDOCAINE HYDROCHLORIDE 20 MG/ML
15 SOLUTION OROPHARYNGEAL ONCE
Status: COMPLETED | OUTPATIENT
Start: 2025-07-07 | End: 2025-07-07

## 2025-07-07 RX ORDER — MORPHINE SULFATE 2 MG/ML
2 INJECTION, SOLUTION INTRAMUSCULAR; INTRAVENOUS
Status: COMPLETED | OUTPATIENT
Start: 2025-07-07 | End: 2025-07-07

## 2025-07-07 RX ORDER — DULOXETIN HYDROCHLORIDE 60 MG/1
60 CAPSULE, DELAYED RELEASE ORAL DAILY
Status: DISCONTINUED | OUTPATIENT
Start: 2025-07-08 | End: 2025-07-16 | Stop reason: HOSPADM

## 2025-07-07 RX ORDER — SODIUM CHLORIDE 0.9 % (FLUSH) 0.9 %
10 SYRINGE (ML) INJECTION
Status: DISCONTINUED | OUTPATIENT
Start: 2025-07-07 | End: 2025-07-16 | Stop reason: HOSPADM

## 2025-07-07 RX ADMIN — LIDOCAINE HYDROCHLORIDE 15 ML: 20 SOLUTION ORAL at 01:07

## 2025-07-07 RX ADMIN — NICARDIPINE HYDROCHLORIDE 10 MG/HR: 0.2 INJECTION, SOLUTION INTRAVENOUS at 05:07

## 2025-07-07 RX ADMIN — HYDROMORPHONE HYDROCHLORIDE 0.5 MG: 1 INJECTION, SOLUTION INTRAMUSCULAR; INTRAVENOUS; SUBCUTANEOUS at 01:07

## 2025-07-07 RX ADMIN — LIDOCAINE HYDROCHLORIDE 15 ML: 20 SOLUTION ORAL at 10:07

## 2025-07-07 RX ADMIN — HYDROMORPHONE HYDROCHLORIDE 1 MG: 1 INJECTION, SOLUTION INTRAMUSCULAR; INTRAVENOUS; SUBCUTANEOUS at 04:07

## 2025-07-07 RX ADMIN — ESMOLOL HYDROCHLORIDE 300 MCG/KG/MIN: 20 INJECTION INTRAVENOUS at 10:07

## 2025-07-07 RX ADMIN — IOHEXOL 75 ML: 350 INJECTION, SOLUTION INTRAVENOUS at 06:07

## 2025-07-07 RX ADMIN — IOHEXOL 75 ML: 350 INJECTION, SOLUTION INTRAVENOUS at 02:07

## 2025-07-07 RX ADMIN — NICARDIPINE HYDROCHLORIDE 2.5 MG/HR: 0.2 INJECTION, SOLUTION INTRAVENOUS at 03:07

## 2025-07-07 RX ADMIN — ESMOLOL HYDROCHLORIDE 300 MCG/KG/MIN: 20 INJECTION INTRAVENOUS at 05:07

## 2025-07-07 RX ADMIN — ALUMINUM HYDROXIDE, MAGNESIUM HYDROXIDE, AND DIMETHICONE 30 ML: 200; 20; 200 SUSPENSION ORAL at 01:07

## 2025-07-07 RX ADMIN — HYDROMORPHONE HYDROCHLORIDE 0.2 MG: 1 INJECTION, SOLUTION INTRAMUSCULAR; INTRAVENOUS; SUBCUTANEOUS at 10:07

## 2025-07-07 RX ADMIN — PANTOPRAZOLE SODIUM 40 MG: 40 INJECTION, POWDER, LYOPHILIZED, FOR SOLUTION INTRAVENOUS at 10:07

## 2025-07-07 RX ADMIN — ONDANSETRON 4 MG: 2 INJECTION INTRAMUSCULAR; INTRAVENOUS at 01:07

## 2025-07-07 RX ADMIN — HYDROMORPHONE HYDROCHLORIDE 0.5 MG: 1 INJECTION, SOLUTION INTRAMUSCULAR; INTRAVENOUS; SUBCUTANEOUS at 02:07

## 2025-07-07 RX ADMIN — METOCLOPRAMIDE 10 MG: 5 INJECTION, SOLUTION INTRAMUSCULAR; INTRAVENOUS at 04:07

## 2025-07-07 RX ADMIN — MORPHINE SULFATE 2 MG: 2 INJECTION, SOLUTION INTRAMUSCULAR; INTRAVENOUS at 12:07

## 2025-07-07 RX ADMIN — ONDANSETRON 4 MG: 2 INJECTION INTRAMUSCULAR; INTRAVENOUS at 12:07

## 2025-07-07 RX ADMIN — PROMETHAZINE HYDROCHLORIDE 12.5 MG: 25 INJECTION INTRAMUSCULAR; INTRAVENOUS at 11:07

## 2025-07-07 RX ADMIN — ESMOLOL HYDROCHLORIDE 25 MCG/KG/MIN: 20 INJECTION INTRAVENOUS at 03:07

## 2025-07-07 RX ADMIN — ESMOLOL HYDROCHLORIDE 300 MCG/KG/MIN: 20 INJECTION INTRAVENOUS at 08:07

## 2025-07-07 RX ADMIN — ESMOLOL HYDROCHLORIDE 300 MCG/KG/MIN: 20 INJECTION INTRAVENOUS at 06:07

## 2025-07-07 RX ADMIN — HYDROMORPHONE HYDROCHLORIDE 1 MG: 1 INJECTION, SOLUTION INTRAMUSCULAR; INTRAVENOUS; SUBCUTANEOUS at 05:07

## 2025-07-07 RX ADMIN — HYDROMORPHONE HYDROCHLORIDE 1 MG: 1 INJECTION, SOLUTION INTRAMUSCULAR; INTRAVENOUS; SUBCUTANEOUS at 03:07

## 2025-07-07 RX ADMIN — ALUMINUM HYDROXIDE, MAGNESIUM HYDROXIDE, AND SIMETHICONE 30 ML: 200; 200; 20 SUSPENSION ORAL at 10:07

## 2025-07-07 RX ADMIN — ONDANSETRON 4 MG: 2 INJECTION INTRAMUSCULAR; INTRAVENOUS at 05:07

## 2025-07-07 RX ADMIN — SODIUM CHLORIDE 1000 ML: 9 INJECTION, SOLUTION INTRAVENOUS at 12:07

## 2025-07-07 RX ADMIN — NICARDIPINE HYDROCHLORIDE 2.5 MG/HR: 0.2 INJECTION, SOLUTION INTRAVENOUS at 04:07

## 2025-07-07 RX ADMIN — ESMOLOL HYDROCHLORIDE 225 MCG/KG/MIN: 20 INJECTION INTRAVENOUS at 11:07

## 2025-07-07 NOTE — ED TRIAGE NOTES
Patient arrive to ed via [facility] for the chief complaint of having a triple AAA. Patient arrived maxed out of esmolol and on 10 of cardine

## 2025-07-07 NOTE — ED NOTES
Patient laying in bed with family at bedside, reminded that she needs to stay in the bed and limit movement

## 2025-07-07 NOTE — ED PROVIDER NOTES
Encounter Date: 2025       History     Chief Complaint   Patient presents with    Gastroesophageal Reflux    Nausea     Shelbie Santamaria is a 60 y.o. female with PMH of hypertension and green headaches presenting to the ED for evaluation of epigastric abdominal pain.  Patient reports that she was eating [employer] when she suddenly developed a sharp pain in her epigastric area with associated nausea and indigestion and frequent belching. She presents diaphoretic with 10/10 pain in her epigastric area.  She denies vomiting.  Denies chest pain or SOB.  Denies feeling syncopal.  No prior hx of GERD.     The history is provided by the patient.     Review of patient's allergies indicates:   Allergen Reactions    Actifed plus      As a child    Erythromycin Other (See Comments)     unknown    Trazodone Palpitations     Past Medical History:   Diagnosis Date    Essential (primary) hypertension     Headache      Past Surgical History:   Procedure Laterality Date    APPENDECTOMY      BREAST LUMPECTOMY      right     SECTION      COLONOSCOPY      CYSTOSCOPY      HYSTERECTOMY      pain- MI/BSO    PELVIC LAPAROSCOPY      TONSILLECTOMY       Family History   Problem Relation Name Age of Onset    Breast cancer Neg Hx      Colon cancer Neg Hx      Ovarian cancer Neg Hx       Social History[1]  Review of Systems   Constitutional:  Positive for activity change and appetite change. Negative for fever.   HENT:  Negative for sore throat.    Respiratory:  Negative for chest tightness and shortness of breath.    Cardiovascular:  Negative for chest pain.   Gastrointestinal:  Positive for abdominal pain and nausea.   Genitourinary:  Negative for dysuria, frequency and urgency.   Musculoskeletal:  Negative for back pain.   Skin:  Negative for rash.   Neurological:  Negative for dizziness, weakness, light-headedness and numbness.   Hematological:  Does not bruise/bleed easily.       Physical Exam     Initial Vitals  [07/07/25 1200]   BP Pulse Resp Temp SpO2   (!) 195/89 64 20 97.6 °F (36.4 °C) 100 %      MAP       --         Physical Exam    Nursing note and vitals reviewed.  Constitutional: She appears well-developed and well-nourished. She is diaphoretic. She appears ill.   HENT:   Head: Normocephalic and atraumatic.   Nose: Nose normal. Mouth/Throat: Uvula is midline, oropharynx is clear and moist and mucous membranes are normal. Mucous membranes are not pale and not dry.   Eyes: Conjunctivae and EOM are normal. Pupils are equal, round, and reactive to light.   Neck: Neck supple.   Normal range of motion.  Cardiovascular:  Normal rate, regular rhythm, normal heart sounds and intact distal pulses.           Pulmonary/Chest: Effort normal and breath sounds normal. She has no decreased breath sounds. She has no wheezes. She has no rhonchi. She has no rales.   Abdominal: Abdomen is soft. Bowel sounds are normal. There is abdominal tenderness in the epigastric area. There is guarding.   Musculoskeletal:         General: Normal range of motion.      Cervical back: Normal range of motion and neck supple.     Neurological: She is alert and oriented to person, place, and time. She has normal strength. She displays normal reflexes. No cranial nerve deficit or sensory deficit.   Skin: Skin is warm. Capillary refill takes less than 2 seconds. No rash noted.   Psychiatric: She has a normal mood and affect. Her behavior is normal. Judgment and thought content normal.         ED Course   Critical Care    Date/Time: 7/8/2025 6:51 AM    Performed by: Sushil Marvin MD  Authorized by: Sushil Marvin MD  Direct patient critical care time: 10 minutes  Additional history critical care time: 5 minutes  Ordering / reviewing critical care time: 5 minutes  Documentation critical care time: 10 minutes  Consulting other physicians critical care time: 5 minutes  Consult with family critical care time: 0 minutes  Total critical care time  (exclusive of procedural time) : 35 minutes  Critical care was necessary to treat or prevent imminent or life-threatening deterioration of the following conditions: Aortic dissection with IV hypertensive management.  Critical care was time spent personally by me on the following activities: interpretation of cardiac output measurements, evaluation of patient's response to treatment, examination of patient, obtaining history from patient or surrogate, ordering and performing treatments and interventions, ordering and review of laboratory studies, ordering and review of radiographic studies, pulse oximetry, re-evaluation of patient's condition and review of old charts.        Labs Reviewed   COMPREHENSIVE METABOLIC PANEL - Abnormal       Result Value    Sodium 138      Potassium 3.4 (*)     Chloride 105      CO2 20 (*)     Glucose 132 (*)     BUN 20      Creatinine 1.0      Calcium 10.3      Protein Total 8.4      Albumin 4.4      Bilirubin Total 0.6      ALP 39 (*)     AST 19      ALT 15      Anion Gap 13      eGFR >60     DRUG SCREEN PANEL, URINE EMERGENCY - Abnormal    Benzodiazepine, Urine Negative      Methadone, Urine Negative      Cocaine, Urine Negative      Opiates, Urine Presumptive Positive (*)     Barbiturates, Urine Negative      Amphetamines, Urine Negative      THC Presumptive Positive (*)     Phencyclidine, Urine Negative      Urine Creatinine 33.1      Narrative:     This screen includes the following classes of drugs at the listed cut-off:     Benzodiazepines:        200 ng/ml   Methadone:              300 ng/ml   Cocaine metabolite:     300 ng/ml   Opiates:                300 ng/ml   Barbiturates:           200 ng/ml   Amphetamines:           1000 ng/ml   Marijuana metabs (THC): 50 ng/ml   Phencyclidine (PCP):    25 ng/ml     This is a screening test. If results do not correlate with clinical presentation, then a confirmatory send out test is advised.    This report is intended for use in clinical  "monitoring and management of patients. It is not intended for use in employment related drug testing."   LIPASE - Normal    Lipase Level 22     URINALYSIS, REFLEX TO URINE CULTURE - Normal    Color, UA Yellow      Appearance, UA Clear      pH, UA 8.0      Spec Grav UA 1.015      Protein, UA Negative      Glucose, UA Negative      Ketones, UA Negative      Bilirubin, UA Negative      Blood, UA Negative      Nitrites, UA Negative      Urobilinogen, UA Negative      Leukocyte Esterase, UA Negative     TROPONIN I - Normal    Troponin-I <0.006     CK - Normal    CPK 69     MAGNESIUM - Normal    Magnesium  1.9     CBC WITH DIFFERENTIAL - Normal    WBC 11.18      RBC 4.77      HGB 14.0      HCT 40.6      MCV 85      MCH 29.4      MCHC 34.5      RDW 13.6      Platelet Count 347      MPV 9.8      Nucleated RBC 0      Neut % 54.0      Lymph % 38.6      Mono % 6.2      Eos % 0.7      Basophil % 0.2      Imm Grans % 0.3      Neut # 6.04      Lymph # 4.32      Mono # 0.69      Eos # 0.08      Baso # 0.02      Imm Grans # 0.03     APTT - Normal    PTT 24.5     PROTIME-INR - Normal    PT 11.8      INR 1.1     CBC W/ AUTO DIFFERENTIAL    Narrative:     The following orders were created for panel order CBC W/ AUTO DIFFERENTIAL.  Procedure                               Abnormality         Status                     ---------                               -----------         ------                     CBC with Differential[[id number]]       Normal              Final result                 Please view results for these tests on the individual orders.   [facility] URINE HOLD   TYPE & SCREEN    Specimen Outdate 07/10/2025 23:59      Group & Rh A POS      Indirect Court NEG            Imaging Results              CTA Chest Abdomen Pelvis (XPD) (Final result)  Result time 07/07/25 16:04:07      Final result by Mel Cooney MD (07/07/25 16:04:07)                   Impression:      Just below the level of the renal arteries, there is a " focal dissection along the right posterior margin of the aorta that extends for a length of approximately 2 cm.  At the inferior margin of this dissection, there is an abnormal lobulated appearance of the aorta.  Is difficult to determine if this is related to age tube aneurysm with a dissection, a pseudoaneurysm or a penetrating atherosclerotic ulcer.  This extends for a length of approximately 4 cm.  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  Vascular consult recommended.    Additional nonemergent findings as above.    Findings were discussed with Dr. Marvin at 1400 on7/7/2025.      Electronically signed by: Mel Cooney MD  Date:    07/07/2025  Time:    16:04               Narrative:    EXAMINATION:  CTA CHEST ABDOMEN PELVIS (XPD)    CLINICAL HISTORY:  acute chest and abdominal pain;    TECHNIQUE:  Axial CT images of the chest, abdomen and pelvis were obtained without and with contrast enhancement for the dissection protocol.  Coronal and sagittal reformats from the axial acquisition.    COMPARISON:  None    FINDINGS:  Calcified atheromatous disease of the aorta.  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  There is a focal dissection along the posterior wall of the abdominal aorta on the right just below the level of the renal arteries that extends inferiorly for approximately 2 cm.  At the inferior margin of this focal dissection on the right, there is an abnormal appearance of the aorta that extends for a length of approximately 4 cm.  It is difficult to determine if this is related to a true aneurysm with dissection, a pseudoaneurysm or possibly a penetrating atherosclerotic ulcer.  A lumbar artery arises from its inferior margin.    The thyroid appears normal.  The main central airways are patent.  The esophagus appears normal.  The heart is normal in size.  No pericardial effusion.  No mediastinal, hilar or axillary adenopathy is seen.    The  lungs are clear.  No consolidation, pleural effusions or pulmonary nodules.    No radiopaque gallstones are seen.  No intrahepatic or extrahepatic biliary ductal dilatation is identified.  The liver, spleen, pancreas, adrenal glands and kidneys are normal in size, shape and contour.  No hydronephrosis or hydroureter.  The urinary bladder is well distended and appears normal.    Moderate colonic stool retention.  No free air or obstruction.  No pathologically enlarged abdominal or pelvic lymph nodes are seen.    Age-appropriate degenerative changes affect the skeleton.                                       Medications   0.9% NaCl infusion (0 mLs Intravenous Stopped 7/7/25 1335)   ondansetron injection 4 mg (4 mg Intravenous Given 7/7/25 1218)   morphine injection 2 mg (2 mg Intravenous Given 7/7/25 1233)   aluminum-magnesium hydroxide-simethicone 200-200-20 mg/5 mL suspension 30 mL (30 mLs Oral Given 7/7/25 1312)     And   LIDOcaine viscous HCl 2% oral solution 15 mL (15 mLs Oral Given 7/7/25 1312)   HYDROmorphone injection 0.5 mg (0.5 mg Intravenous Given 7/7/25 1324)   iohexoL (OMNIPAQUE 350) injection 75 mL (75 mLs Intravenous Given 7/7/25 1405)   ondansetron injection 4 mg (4 mg Intravenous Given 7/7/25 1350)   HYDROmorphone injection 0.5 mg (0.5 mg Intravenous Given 7/7/25 1442)   HYDROmorphone injection 1 mg (1 mg Intravenous Given 7/7/25 1538)   metoclopramide injection 10 mg (10 mg Intravenous Given 7/7/25 1619)   HYDROmorphone injection 1 mg (1 mg Intravenous Given 7/7/25 1640)     Medical Decision Making  Emergent evaluation of a 60-year-old female presenting with acute epigastric pain and nausea after eating [employer]  Presents diaphoretic with stable vital signs; mildly hypertensive with a BP of 195/89.  + epigastric TTP with guarding  Good distal pulses bilaterally     Diff dx includes ACS, Aortic dissection, GERD, Gastritis, PUD       1310- CT scan down; will notify ASAP when working     Amount and/or  Complexity of Data Reviewed  Labs: ordered. Decision-making details documented in ED Course.  Radiology: ordered. Decision-making details documented in ED Course.  ECG/medicine tests: ordered and independent interpretation performed.     Details: Sinus bradycardia, rate 58.  Normal MA and QRS interval.  No STEMI.  No previous EKG for comparison    Risk  OTC drugs.  Prescription drug management.  Risk Details: Lab workup with no leukocytosis.  CMP with a serum potassium of 3.4, otherwise unremarkable.  Negative troponin.  Patient given both morphine and GI cocktail with minimal improvement in pain. Aortic dissection in differential therefore CTA Chest Abd Pelv ordered stat that shows just below the level of the renal arteries, there is a focal dissection along the right posterior margin of the aorta that extends for a length of approximately 2 cm.  At the inferior margin of this dissection, there is an abnormal lobulated appearance of the aorta.  Is difficult to determine if this is related to age tube aneurysm with a dissection, a pseudoaneurysm or a penetrating atherosclerotic ulcer.  This extends for a length of approximately 4 cm.  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  Vascular consult recommended.    Esmolol gtt and Cardene gtt initiated for BP and HR control    Dilauded for pain control.     Dr. Marvin spoke to Vascular Card for stat transfer. Accepted at [facility] [place]                                                Clinical Impression:  Final diagnoses:  [R10.13] Epigastric abdominal pain  [I71.012] Dissection of descending thoracic aorta (Primary)          ED Disposition Condition    Transfer to Another Facility Stable                    Angeli Stapleton NP  [phone]         [1]   Social History  Tobacco Use    Smoking status: Former     Current packs/day: 0.00     Types: Cigarettes     Quit date: 2018     Years since quittin.4    Smokeless tobacco: Never   Substance Use Topics     Alcohol use: Never    Drug use: Never        Sushil Marvin MD  07/08/25 0652

## 2025-07-07 NOTE — ED TRIAGE NOTES
Patient reports began with indigestion and nausea with diaphoresis after eating [employer]. Patient diaphoretic upon arrival.

## 2025-07-08 PROBLEM — K29.80 DUODENITIS: Status: RESOLVED | Noted: 2025-07-07 | Resolved: 2025-07-08

## 2025-07-08 PROBLEM — R11.2 NAUSEA & VOMITING: Status: ACTIVE | Noted: 2025-07-08

## 2025-07-08 PROBLEM — E87.6 HYPOKALEMIA: Status: ACTIVE | Noted: 2025-07-08

## 2025-07-08 LAB
ABSOLUTE EOSINOPHIL (OHS): 0 K/UL
ABSOLUTE MONOCYTE (OHS): 0.71 K/UL (ref 0.3–1)
ABSOLUTE NEUTROPHIL COUNT (OHS): 8.29 K/UL (ref 1.8–7.7)
ALBUMIN SERPL BCP-MCNC: 4.4 G/DL (ref 3.5–5.2)
ALLENS TEST: ABNORMAL
ALP SERPL-CCNC: 40 UNIT/L (ref 40–150)
ALT SERPL W/O P-5'-P-CCNC: 13 UNIT/L (ref 10–44)
ANION GAP (OHS): 13 MMOL/L (ref 8–16)
AORTIC SIZE INDEX (SOV): 1.5 CM/M2
AST SERPL-CCNC: 20 UNIT/L (ref 11–45)
AV AREA BY CONTINUOUS VTI: 2.8 CM2
AV INDEX (PROSTH): 0.87
AV LVOT MEAN GRADIENT: 2 MMHG
AV LVOT PEAK GRADIENT: 4 MMHG
AV MEAN GRADIENT: 4 MMHG
AV PEAK GRADIENT: 7 MMHG
AV VALVE AREA BY VELOCITY RATIO: 2.2 CM²
AV VALVE AREA: 2.7 CM2
AV VELOCITY RATIO: 0.69
BASOPHILS # BLD AUTO: 0.01 K/UL
BASOPHILS NFR BLD AUTO: 0.1 %
BILIRUB SERPL-MCNC: 0.5 MG/DL (ref 0.1–1)
BSA FOR ECHO PROCEDURE: 1.92 M2
BUN SERPL-MCNC: 14 MG/DL (ref 8–23)
CALCIUM SERPL-MCNC: 9.7 MG/DL (ref 8.7–10.5)
CHLORIDE SERPL-SCNC: 111 MMOL/L (ref 95–110)
CO2 SERPL-SCNC: 16 MMOL/L (ref 23–29)
CREAT SERPL-MCNC: 0.9 MG/DL (ref 0.5–1.4)
CV ECHO LV RWT: 0.4 CM
DELSYS: ABNORMAL
DOP CALC AO PEAK VEL: 1.3 M/S
DOP CALC AO VTI: 26.8 CM
DOP CALC LVOT AREA: 3.1 CM2
DOP CALC LVOT DIAMETER: 2 CM
DOP CALC LVOT PEAK VEL: 0.9 M/S
DOP CALC LVOT STROKE VOLUME: 72.8 CM3
DOP CALCLVOT PEAK VEL VTI: 23.2 CM
E WAVE DECELERATION TIME: 303 MS
E WAVE DECELERATION TIME: 321 MS
E/A RATIO: 0.86
E/E' RATIO: 9 M/S
ECHO EF ESTIMATED: 65 %
ECHO LV POSTERIOR WALL: 0.9 CM (ref 0.6–1.1)
ERYTHROCYTE [DISTWIDTH] IN BLOOD BY AUTOMATED COUNT: 13.7 % (ref 11.5–14.5)
FRACTIONAL SHORTENING: 48.9 % (ref 28–44)
GFR SERPLBLD CREATININE-BSD FMLA CKD-EPI: >60 ML/MIN/1.73/M2
GLUCOSE SERPL-MCNC: 138 MG/DL (ref 70–110)
HCO3 UR-SCNC: 20.7 MMOL/L (ref 24–28)
HCT VFR BLD AUTO: 37.1 % (ref 37–48.5)
HCT VFR BLD CALC: 35 %PCV (ref 36–54)
HGB BLD-MCNC: 12.5 GM/DL (ref 12–16)
HOLD SPECIMEN: NORMAL
IMM GRANULOCYTES # BLD AUTO: 0.02 K/UL (ref 0–0.04)
IMM GRANULOCYTES NFR BLD AUTO: 0.2 % (ref 0–0.5)
INTERVENTRICULAR SEPTUM: 1.1 CM (ref 0.6–1.1)
LA MAJOR: 3.7 CM
LA MINOR: 4.2 CM
LA WIDTH: 3.1 CM
LACTATE SERPL-SCNC: 1.2 MMOL/L (ref 0.5–2.2)
LACTATE SERPL-SCNC: 1.3 MMOL/L (ref 0.5–2.2)
LEFT ATRIUM SIZE: 3 CM
LEFT ATRIUM VOLUME INDEX: 16 ML/M2
LEFT ATRIUM VOLUME: 31 CM3
LEFT INTERNAL DIMENSION IN SYSTOLE: 2.3 CM (ref 2.1–4)
LEFT VENTRICLE DIASTOLIC VOLUME INDEX: 25.93 ML/M2
LEFT VENTRICLE DIASTOLIC VOLUME: 49 ML
LEFT VENTRICLE MASS INDEX: 81.1 G/M2
LEFT VENTRICLE SYSTOLIC VOLUME INDEX: 9 ML/M2
LEFT VENTRICLE SYSTOLIC VOLUME: 17 ML
LEFT VENTRICULAR INTERNAL DIMENSION IN DIASTOLE: 4.5 CM (ref 3.5–6)
LEFT VENTRICULAR MASS: 153.3 G
LV LATERAL E/E' RATIO: 7.5
LV SEPTAL E/E' RATIO: 10.4
LYMPHOCYTES # BLD AUTO: 2.24 K/UL (ref 1–4.8)
MAGNESIUM SERPL-MCNC: 1.9 MG/DL (ref 1.6–2.6)
MCH RBC QN AUTO: 29.1 PG (ref 27–31)
MCHC RBC AUTO-ENTMCNC: 33.7 G/DL (ref 32–36)
MCV RBC AUTO: 87 FL (ref 82–98)
MV PEAK A VEL: 0.97 M/S
MV PEAK E VEL: 0.83 M/S
NUCLEATED RBC (/100WBC) (OHS): 0 /100 WBC
OHS CV RV/LV RATIO: 0.64 CM
OHS QRS DURATION: 88 MS
OHS QRS DURATION: 96 MS
OHS QTC CALCULATION: 439 MS
OHS QTC CALCULATION: 483 MS
PCO2 BLDA: 32.1 MMHG (ref 35–45)
PH SMN: 7.42 [PH] (ref 7.35–7.45)
PHOSPHATE SERPL-MCNC: 2.6 MG/DL (ref 2.7–4.5)
PLATELET # BLD AUTO: 310 K/UL (ref 150–450)
PMV BLD AUTO: 10.1 FL (ref 9.2–12.9)
PO2 BLDA: 30 MMHG (ref 80–100)
POC BE: -4 MMOL/L (ref -2–2)
POC IONIZED CALCIUM: 1.25 MMOL/L (ref 1.06–1.42)
POC SATURATED O2: 60 % (ref 95–100)
POC TCO2: 22 MMOL/L (ref 23–27)
POCT GLUCOSE: 100 MG/DL (ref 70–110)
POCT GLUCOSE: 86 MG/DL (ref 70–110)
POTASSIUM BLD-SCNC: 3.3 MMOL/L (ref 3.5–5.1)
POTASSIUM SERPL-SCNC: 3.3 MMOL/L (ref 3.5–5.1)
PROT SERPL-MCNC: 7.6 GM/DL (ref 6–8.4)
RA MAJOR: 3.98 CM
RA PRESSURE ESTIMATED: 3 MMHG
RA WIDTH: 3.06 CM
RBC # BLD AUTO: 4.29 M/UL (ref 4–5.4)
RELATIVE EOSINOPHIL (OHS): 0 %
RELATIVE LYMPHOCYTE (OHS): 19.9 % (ref 18–48)
RELATIVE MONOCYTE (OHS): 6.3 % (ref 4–15)
RELATIVE NEUTROPHIL (OHS): 73.5 % (ref 38–73)
RIGHT VENTRICLE DIASTOLIC BASEL DIMENSION: 2.9 CM
SAMPLE: ABNORMAL
SINUS: 2.8 CM
SITE: ABNORMAL
SODIUM BLD-SCNC: 141 MMOL/L (ref 136–145)
SODIUM SERPL-SCNC: 140 MMOL/L (ref 136–145)
STJ: 2.3 CM
TDI LATERAL: 0.11 M/S
TDI SEPTAL: 0.08 M/S
TDI: 0.1 M/S
TRICUSPID ANNULAR PLANE SYSTOLIC EXCURSION: 2.3 CM
TROPONIN I SERPL HS-MCNC: 10 NG/L
WBC # BLD AUTO: 11.27 K/UL (ref 3.9–12.7)
Z-SCORE OF LEFT VENTRICULAR DIMENSION IN END DIASTOLE: -1.62
Z-SCORE OF LEFT VENTRICULAR DIMENSION IN END SYSTOLE: -2.76

## 2025-07-08 PROCEDURE — 82803 BLOOD GASES ANY COMBINATION: CPT

## 2025-07-08 PROCEDURE — 80053 COMPREHEN METABOLIC PANEL: CPT

## 2025-07-08 PROCEDURE — 25000003 PHARM REV CODE 250: Performed by: STUDENT IN AN ORGANIZED HEALTH CARE EDUCATION/TRAINING PROGRAM

## 2025-07-08 PROCEDURE — 94761 N-INVAS EAR/PLS OXIMETRY MLT: CPT | Mod: XB

## 2025-07-08 PROCEDURE — 20000000 HC ICU ROOM

## 2025-07-08 PROCEDURE — 85025 COMPLETE CBC W/AUTO DIFF WBC: CPT

## 2025-07-08 PROCEDURE — 99291 CRITICAL CARE FIRST HOUR: CPT | Mod: ,,,

## 2025-07-08 PROCEDURE — 83605 ASSAY OF LACTIC ACID: CPT

## 2025-07-08 PROCEDURE — 63600175 PHARM REV CODE 636 W HCPCS

## 2025-07-08 PROCEDURE — 25000242 PHARM REV CODE 250 ALT 637 W/ HCPCS

## 2025-07-08 PROCEDURE — 99900035 HC TECH TIME PER 15 MIN (STAT)

## 2025-07-08 PROCEDURE — 84100 ASSAY OF PHOSPHORUS: CPT

## 2025-07-08 PROCEDURE — 25000003 PHARM REV CODE 250

## 2025-07-08 PROCEDURE — 83735 ASSAY OF MAGNESIUM: CPT

## 2025-07-08 PROCEDURE — 84484 ASSAY OF TROPONIN QUANT: CPT

## 2025-07-08 RX ORDER — HYDROMORPHONE HYDROCHLORIDE 1 MG/ML
1 INJECTION, SOLUTION INTRAMUSCULAR; INTRAVENOUS; SUBCUTANEOUS EVERY 4 HOURS PRN
Status: DISCONTINUED | OUTPATIENT
Start: 2025-07-08 | End: 2025-07-09

## 2025-07-08 RX ORDER — SYRING-NEEDL,DISP,INSUL,0.3 ML 29 G X1/2"
296 SYRINGE, EMPTY DISPOSABLE MISCELLANEOUS
Status: COMPLETED | OUTPATIENT
Start: 2025-07-08 | End: 2025-07-08

## 2025-07-08 RX ORDER — AMOXICILLIN 250 MG
1 CAPSULE ORAL DAILY PRN
Status: DISCONTINUED | OUTPATIENT
Start: 2025-07-08 | End: 2025-07-16 | Stop reason: HOSPADM

## 2025-07-08 RX ORDER — HYDROMORPHONE HYDROCHLORIDE 1 MG/ML
0.5 INJECTION, SOLUTION INTRAMUSCULAR; INTRAVENOUS; SUBCUTANEOUS ONCE
Status: COMPLETED | OUTPATIENT
Start: 2025-07-08 | End: 2025-07-08

## 2025-07-08 RX ORDER — FAMOTIDINE 10 MG/ML
20 INJECTION, SOLUTION INTRAVENOUS 2 TIMES DAILY
Status: DISCONTINUED | OUTPATIENT
Start: 2025-07-08 | End: 2025-07-08

## 2025-07-08 RX ORDER — METOCLOPRAMIDE HYDROCHLORIDE 5 MG/ML
10 INJECTION INTRAMUSCULAR; INTRAVENOUS ONCE
Status: COMPLETED | OUTPATIENT
Start: 2025-07-09 | End: 2025-07-08

## 2025-07-08 RX ORDER — SODIUM CHLORIDE 0.9 G/100ML
500 IRRIGANT IRRIGATION
Status: COMPLETED | OUTPATIENT
Start: 2025-07-08 | End: 2025-07-08

## 2025-07-08 RX ORDER — GLUCAGON 1 MG
1 KIT INJECTION
Status: DISCONTINUED | OUTPATIENT
Start: 2025-07-08 | End: 2025-07-14

## 2025-07-08 RX ORDER — HYDROMORPHONE HYDROCHLORIDE 1 MG/ML
0.2 INJECTION, SOLUTION INTRAMUSCULAR; INTRAVENOUS; SUBCUTANEOUS EVERY 6 HOURS PRN
Status: DISCONTINUED | OUTPATIENT
Start: 2025-07-08 | End: 2025-07-08

## 2025-07-08 RX ORDER — DICYCLOMINE HYDROCHLORIDE 10 MG/ML
20 INJECTION INTRAMUSCULAR ONCE
Status: COMPLETED | OUTPATIENT
Start: 2025-07-08 | End: 2025-07-08

## 2025-07-08 RX ORDER — ENOXAPARIN SODIUM 100 MG/ML
40 INJECTION SUBCUTANEOUS EVERY 24 HOURS
Status: DISCONTINUED | OUTPATIENT
Start: 2025-07-08 | End: 2025-07-14

## 2025-07-08 RX ORDER — HYDROMORPHONE HYDROCHLORIDE 1 MG/ML
1 INJECTION, SOLUTION INTRAMUSCULAR; INTRAVENOUS; SUBCUTANEOUS ONCE
Status: COMPLETED | OUTPATIENT
Start: 2025-07-08 | End: 2025-07-08

## 2025-07-08 RX ORDER — AMLODIPINE BESYLATE 10 MG/1
10 TABLET ORAL DAILY
Status: DISCONTINUED | OUTPATIENT
Start: 2025-07-08 | End: 2025-07-16 | Stop reason: HOSPADM

## 2025-07-08 RX ORDER — INSULIN ASPART 100 [IU]/ML
0-5 INJECTION, SOLUTION INTRAVENOUS; SUBCUTANEOUS EVERY 6 HOURS PRN
Status: DISCONTINUED | OUTPATIENT
Start: 2025-07-08 | End: 2025-07-14

## 2025-07-08 RX ORDER — NICARDIPINE HYDROCHLORIDE 0.2 MG/ML
0-15 INJECTION INTRAVENOUS CONTINUOUS
Status: DISCONTINUED | OUTPATIENT
Start: 2025-07-08 | End: 2025-07-08

## 2025-07-08 RX ORDER — MAGNESIUM SULFATE HEPTAHYDRATE 40 MG/ML
2 INJECTION, SOLUTION INTRAVENOUS ONCE
Status: COMPLETED | OUTPATIENT
Start: 2025-07-08 | End: 2025-07-08

## 2025-07-08 RX ORDER — DICYCLOMINE HYDROCHLORIDE 10 MG/ML
20 INJECTION INTRAMUSCULAR ONCE
Status: DISCONTINUED | OUTPATIENT
Start: 2025-07-08 | End: 2025-07-08

## 2025-07-08 RX ORDER — DROPERIDOL 2.5 MG/ML
0.62 INJECTION, SOLUTION INTRAMUSCULAR; INTRAVENOUS ONCE
Status: COMPLETED | OUTPATIENT
Start: 2025-07-08 | End: 2025-07-08

## 2025-07-08 RX ORDER — HYDROMORPHONE HYDROCHLORIDE 1 MG/ML
1 INJECTION, SOLUTION INTRAMUSCULAR; INTRAVENOUS; SUBCUTANEOUS EVERY 6 HOURS PRN
Status: DISCONTINUED | OUTPATIENT
Start: 2025-07-08 | End: 2025-07-08

## 2025-07-08 RX ORDER — POLYETHYLENE GLYCOL 3350 17 G/17G
17 POWDER, FOR SOLUTION ORAL DAILY PRN
Status: DISCONTINUED | OUTPATIENT
Start: 2025-07-08 | End: 2025-07-15

## 2025-07-08 RX ORDER — NICARDIPINE HYDROCHLORIDE 0.2 MG/ML
0-15 INJECTION INTRAVENOUS CONTINUOUS
Status: DISCONTINUED | OUTPATIENT
Start: 2025-07-08 | End: 2025-07-10

## 2025-07-08 RX ORDER — PSEUDOEPHEDRINE/ACETAMINOPHEN 30MG-500MG
100 TABLET ORAL
Status: COMPLETED | OUTPATIENT
Start: 2025-07-08 | End: 2025-07-08

## 2025-07-08 RX ORDER — POTASSIUM CHLORIDE 7.45 MG/ML
10 INJECTION INTRAVENOUS
Status: COMPLETED | OUTPATIENT
Start: 2025-07-08 | End: 2025-07-08

## 2025-07-08 RX ORDER — FAMOTIDINE 10 MG/ML
20 INJECTION, SOLUTION INTRAVENOUS ONCE
Status: COMPLETED | OUTPATIENT
Start: 2025-07-08 | End: 2025-07-08

## 2025-07-08 RX ORDER — ESMOLOL HYDROCHLORIDE 20 MG/ML
0-300 INJECTION, SOLUTION INTRAVENOUS CONTINUOUS
Status: DISCONTINUED | OUTPATIENT
Start: 2025-07-08 | End: 2025-07-12

## 2025-07-08 RX ADMIN — HYDROMORPHONE HYDROCHLORIDE 1 MG: 1 INJECTION, SOLUTION INTRAMUSCULAR; INTRAVENOUS; SUBCUTANEOUS at 06:07

## 2025-07-08 RX ADMIN — NICARDIPINE HYDROCHLORIDE 2.5 MG/HR: 0.2 INJECTION INTRAVENOUS at 04:07

## 2025-07-08 RX ADMIN — HYDROMORPHONE HYDROCHLORIDE 1 MG: 1 INJECTION, SOLUTION INTRAMUSCULAR; INTRAVENOUS; SUBCUTANEOUS at 11:07

## 2025-07-08 RX ADMIN — SODIUM CHLORIDE 500 ML: 900 IRRIGANT IRRIGATION at 11:07

## 2025-07-08 RX ADMIN — ESMOLOL HYDROCHLORIDE 150 MCG/KG/MIN: 20 INJECTION INTRAVENOUS at 01:07

## 2025-07-08 RX ADMIN — PROMETHAZINE HYDROCHLORIDE 25 MG: 25 INJECTION INTRAMUSCULAR; INTRAVENOUS at 09:07

## 2025-07-08 RX ADMIN — DULOXETINE 60 MG: 60 CAPSULE, DELAYED RELEASE ORAL at 08:07

## 2025-07-08 RX ADMIN — FENOFIBRATE 160 MG: 160 TABLET ORAL at 08:07

## 2025-07-08 RX ADMIN — METOPROLOL TARTRATE 50 MG: 50 TABLET, FILM COATED ORAL at 08:07

## 2025-07-08 RX ADMIN — HYDROMORPHONE HYDROCHLORIDE 1 MG: 1 INJECTION, SOLUTION INTRAMUSCULAR; INTRAVENOUS; SUBCUTANEOUS at 08:07

## 2025-07-08 RX ADMIN — HYDROMORPHONE HYDROCHLORIDE 0.5 MG: 1 INJECTION, SOLUTION INTRAMUSCULAR; INTRAVENOUS; SUBCUTANEOUS at 04:07

## 2025-07-08 RX ADMIN — MAGNESIUM SULFATE HEPTAHYDRATE 2 G: 40 INJECTION, SOLUTION INTRAVENOUS at 06:07

## 2025-07-08 RX ADMIN — POTASSIUM CHLORIDE 10 MEQ: 7.46 INJECTION, SOLUTION INTRAVENOUS at 08:07

## 2025-07-08 RX ADMIN — ESMOLOL HYDROCHLORIDE 175 MCG/KG/MIN: 20 INJECTION INTRAVENOUS at 04:07

## 2025-07-08 RX ADMIN — HYDROMORPHONE HYDROCHLORIDE 1 MG: 1 INJECTION, SOLUTION INTRAMUSCULAR; INTRAVENOUS; SUBCUTANEOUS at 05:07

## 2025-07-08 RX ADMIN — DROPERIDOL 0.62 MG: 2.5 INJECTION, SOLUTION INTRAMUSCULAR; INTRAVENOUS at 06:07

## 2025-07-08 RX ADMIN — HYDROMORPHONE HYDROCHLORIDE 0.2 MG: 1 INJECTION, SOLUTION INTRAMUSCULAR; INTRAVENOUS; SUBCUTANEOUS at 05:07

## 2025-07-08 RX ADMIN — ESMOLOL HYDROCHLORIDE 50 MCG/KG/MIN: 20 INJECTION INTRAVENOUS at 08:07

## 2025-07-08 RX ADMIN — SODIUM PHOSPHATE, MONOBASIC, MONOHYDRATE AND SODIUM PHOSPHATE, DIBASIC, ANHYDROUS 15 MMOL: 142; 276 INJECTION, SOLUTION INTRAVENOUS at 06:07

## 2025-07-08 RX ADMIN — MAGNESIUM CITRATE 296 ML: 1.75 LIQUID ORAL at 11:07

## 2025-07-08 RX ADMIN — SODIUM CHLORIDE, POTASSIUM CHLORIDE, SODIUM LACTATE AND CALCIUM CHLORIDE 500 ML: 600; 310; 30; 20 INJECTION, SOLUTION INTRAVENOUS at 11:07

## 2025-07-08 RX ADMIN — POTASSIUM CHLORIDE 10 MEQ: 7.46 INJECTION, SOLUTION INTRAVENOUS at 07:07

## 2025-07-08 RX ADMIN — ONDANSETRON 4 MG: 2 INJECTION INTRAMUSCULAR; INTRAVENOUS at 06:07

## 2025-07-08 RX ADMIN — DICYCLOMINE HYDROCHLORIDE 20 MG: 10 INJECTION, SOLUTION INTRAMUSCULAR at 09:07

## 2025-07-08 RX ADMIN — POTASSIUM CHLORIDE 10 MEQ: 7.46 INJECTION, SOLUTION INTRAVENOUS at 06:07

## 2025-07-08 RX ADMIN — FAMOTIDINE 20 MG: 10 INJECTION, SOLUTION INTRAVENOUS at 06:07

## 2025-07-08 RX ADMIN — PROMETHAZINE HYDROCHLORIDE 25 MG: 25 INJECTION INTRAMUSCULAR; INTRAVENOUS at 04:07

## 2025-07-08 RX ADMIN — Medication 100 ML: at 11:07

## 2025-07-08 RX ADMIN — NICARDIPINE HYDROCHLORIDE 2.5 MG/HR: 0.2 INJECTION, SOLUTION INTRAVENOUS at 06:07

## 2025-07-08 RX ADMIN — ASPIRIN 81 MG CHEWABLE TABLET 81 MG: 81 TABLET CHEWABLE at 08:07

## 2025-07-08 RX ADMIN — HYDROMORPHONE HYDROCHLORIDE 0.2 MG: 1 INJECTION, SOLUTION INTRAMUSCULAR; INTRAVENOUS; SUBCUTANEOUS at 03:07

## 2025-07-08 RX ADMIN — HYDROMORPHONE HYDROCHLORIDE 0.5 MG: 1 INJECTION, SOLUTION INTRAMUSCULAR; INTRAVENOUS; SUBCUTANEOUS at 03:07

## 2025-07-08 RX ADMIN — METOCLOPRAMIDE 10 MG: 5 INJECTION, SOLUTION INTRAMUSCULAR; INTRAVENOUS at 11:07

## 2025-07-08 RX ADMIN — ENOXAPARIN SODIUM 40 MG: 40 INJECTION SUBCUTANEOUS at 05:07

## 2025-07-08 RX ADMIN — PANTOPRAZOLE SODIUM 40 MG: 40 INJECTION, POWDER, LYOPHILIZED, FOR SOLUTION INTRAVENOUS at 08:07

## 2025-07-08 RX ADMIN — AMLODIPINE BESYLATE 10 MG: 10 TABLET ORAL at 08:07

## 2025-07-08 RX ADMIN — ONDANSETRON 4 MG: 2 INJECTION INTRAMUSCULAR; INTRAVENOUS at 03:07

## 2025-07-08 NOTE — SUBJECTIVE & OBJECTIVE
Medications:  Continuous Infusions:   esmolol  0-300 mcg/kg/min Intravenous Continuous 41.9 mL/hr at 07/08/25 0447 175 mcg/kg/min at 07/08/25 0447    nicardipine  0-15 mg/hr Intravenous Continuous 12.5 mL/hr at 07/08/25 0440 2.5 mg/hr at 07/08/25 0440     Scheduled Meds:   amLODIPine  10 mg Oral Daily    aspirin  81 mg Oral Daily    dicyclomine  20 mg Intramuscular Once    DULoxetine  60 mg Oral Daily    enoxparin  40 mg Subcutaneous Q24H (prophylaxis, 1700)    ezetimibe  10 mg Oral Nightly    fenofibrate  160 mg Oral Daily    magnesium sulfate 2 g IVPB  2 g Intravenous Once    metoprolol tartrate  50 mg Oral BID    omega-3 acid ethyl esters  1 g Oral Daily    pantoprazole  40 mg Intravenous Daily    potassium chloride  10 mEq Intravenous Q1H    sodium phosphate 15 mmol in D5W 250 mL IVPB  15 mmol Intravenous Once     PRN Meds:  Current Facility-Administered Medications:     dextrose 50%, 12.5 g, Intravenous, PRN    glucagon (human recombinant), 1 mg, Intramuscular, PRN    HYDROmorphone, 0.2 mg, Intravenous, Q6H PRN    insulin aspart U-100, 0-5 Units, Subcutaneous, Q6H PRN    ondansetron, 4 mg, Intravenous, Q6H PRN    polyethylene glycol, 17 g, Oral, Daily PRN    senna-docusate, 1 tablet, Oral, Daily PRN    sodium chloride 0.9%, 10 mL, Intravenous, PRN     Objective:     Vital Signs (Most Recent):  Temp: 98 °F (36.7 °C) (07/08/25 0504)  Pulse: 88 (07/08/25 0541)  Resp: 20 (07/08/25 0504)  BP: (!) 173/81 (07/08/25 0447)  SpO2: 97 % (07/08/25 0541) Vital Signs (24h Range):  Temp:  [97.6 °F (36.4 °C)-98.9 °F (37.2 °C)] 98 °F (36.7 °C)  Pulse:  [64-93] 88  Resp:  [13-33] 20  SpO2:  [95 %-100 %] 97 %  BP: ()/(48-89) 173/81          Physical Exam  Constitutional:       General: She is not in acute distress.     Appearance: She is obese.   HENT:      Mouth/Throat:      Mouth: Mucous membranes are dry.   Cardiovascular:      Rate and Rhythm: Normal rate.      Pulses: Normal pulses.      Comments: Palpable radial,  femoral, popliteal, and pedal pulses bilaterally.   Non pulsatile aortic mass.   Epigastric fullness/distention.     Pulmonary:      Effort: Pulmonary effort is normal.   Abdominal:      Tenderness: There is no abdominal tenderness. There is no guarding.      Comments: Distension and tenderness improved from yesterday   Musculoskeletal:      Cervical back: Neck supple.   Skin:     General: Skin is warm.   Neurological:      Mental Status: She is alert and oriented to person, place, and time.      Sensory: No sensory deficit.      Motor: No weakness.          Significant Labs:  All pertinent labs from the last 24 hours have been reviewed.    Significant Diagnostics:  I have reviewed all pertinent imaging results/findings within the past 24 hours.

## 2025-07-08 NOTE — EICU
Virtual ICU Admission    Admit Date: 2025  LOS: 1  Code Status: Full Code   : 1965  Bed: 7092/7092 A:     Diagnosis: Aortic dissection    Patient  has a past medical history of Essential (primary) hypertension and Headache.    Last VS: BP (!) 116/59   Pulse 81   Temp 98.9 °F (37.2 °C) (Oral)   Resp (!) 23   Wt 79.8 kg (175 lb 14.8 oz)   SpO2 100%   BMI 28.40 kg/m²       VICU Review    VICU nurse assessment :  [place] completed, LDA documentation reconciliation completed, and VTE prophylaxis review        Nursing orders placed : IP DEMIAN Peripheral IV Access

## 2025-07-08 NOTE — PROGRESS NOTES
[facility] - Medical ICU  Vascular Surgery  Progress Note    Patient Name: Shelbie Santamaria  MRN: [id number]  Admission Date: 7/7/2025  Primary Care Provider: Sunny Alexandre NP    Subjective:     Interval History: Pt vomited yesterday and has interval improvement in abdominal pain/tenderness/swelling. NG tube in place, but had not yet been placed to suction, so emesis was likely reason for improvement in pain.    Post-Op Info:  * No surgery found *         Medications:  Continuous Infusions:   esmolol  0-300 mcg/kg/min Intravenous Continuous 41.9 mL/hr at 07/08/25 0447 175 mcg/kg/min at 07/08/25 0447    nicardipine  0-15 mg/hr Intravenous Continuous 12.5 mL/hr at 07/08/25 0440 2.5 mg/hr at 07/08/25 0440     Scheduled Meds:   amLODIPine  10 mg Oral Daily    aspirin  81 mg Oral Daily    dicyclomine  20 mg Intramuscular Once    DULoxetine  60 mg Oral Daily    enoxparin  40 mg Subcutaneous Q24H (prophylaxis, 1700)    ezetimibe  10 mg Oral Nightly    fenofibrate  160 mg Oral Daily    magnesium sulfate 2 g IVPB  2 g Intravenous Once    metoprolol tartrate  50 mg Oral BID    omega-3 acid ethyl esters  1 g Oral Daily    pantoprazole  40 mg Intravenous Daily    potassium chloride  10 mEq Intravenous Q1H    sodium phosphate 15 mmol in D5W 250 mL IVPB  15 mmol Intravenous Once     PRN Meds:  Current Facility-Administered Medications:     dextrose 50%, 12.5 g, Intravenous, PRN    glucagon (human recombinant), 1 mg, Intramuscular, PRN    HYDROmorphone, 0.2 mg, Intravenous, Q6H PRN    insulin aspart U-100, 0-5 Units, Subcutaneous, Q6H PRN    ondansetron, 4 mg, Intravenous, Q6H PRN    polyethylene glycol, 17 g, Oral, Daily PRN    senna-docusate, 1 tablet, Oral, Daily PRN    sodium chloride 0.9%, 10 mL, Intravenous, PRN     Objective:     Vital Signs (Most Recent):  Temp: 98 °F (36.7 °C) (07/08/25 0504)  Pulse: 88 (07/08/25 0541)  Resp: 20 (07/08/25 0504)  BP: (!) 173/81 (07/08/25 0447)  SpO2: 97 % (07/08/25 5690) Vital Signs  (24h Range):  Temp:  [97.6 °F (36.4 °C)-98.9 °F (37.2 °C)] 98 °F (36.7 °C)  Pulse:  [64-93] 88  Resp:  [13-33] 20  SpO2:  [95 %-100 %] 97 %  BP: ()/(48-89) 173/81          Physical Exam  Constitutional:       General: She is not in acute distress.     Appearance: She is obese.   HENT:      Mouth/Throat:      Mouth: Mucous membranes are dry.   Cardiovascular:      Rate and Rhythm: Normal rate.      Pulses: Normal pulses.      Comments: Palpable radial, femoral, popliteal, and pedal pulses bilaterally.   Non pulsatile aortic mass.   Epigastric fullness/distention.     Pulmonary:      Effort: Pulmonary effort is normal.   Abdominal:      Tenderness: There is no abdominal tenderness. There is no guarding.      Comments: Distension and tenderness improved from yesterday   Musculoskeletal:      Cervical back: Neck supple.   Skin:     General: Skin is warm.   Neurological:      Mental Status: She is alert and oriented to person, place, and time.      Sensory: No sensory deficit.      Motor: No weakness.          Significant Labs:  All pertinent labs from the last 24 hours have been reviewed.    Significant Diagnostics:  I have reviewed all pertinent imaging results/findings within the past 24 hours.  Assessment/Plan:     Duodenitis  60F with likely incidental discovery of short segment paravisceral aortic dissection w/o malperfusion with concomitant massive gastric distention, antral and duodenal inflammation in patient with long standing GI/enteral complaints and new foregut symptoms.     - Suspect her severe gastric dissection is cause of pain, not her dissection, which may be chronic.  - NG tube in place to decompress  - Recommend ASA with BP goal <140, HR <80. May resume oral anti hypertensives.    No plan for further imaging, intervention from vascular perspective at this time.         Garcia Ruiz MD  Vascular Surgery  [facility] - Medical ICU

## 2025-07-08 NOTE — ASSESSMENT & PLAN NOTE
60F with likely incidental discovery of short segment paravisceral aortic dissection with concomitant massive gastric distention, antral and duodenal inflammation in patient with long standing GI/enteral complaints and new foregut symptoms.     Suspect her severe gastric dissection is cause of pain, not her dissection, which may be chronic.   Recommend ASA with BP goal <140, HR <80.   Consider nasogastric decompression    No plan for further imaging, intervention from vascular perspective at this time.

## 2025-07-08 NOTE — SUBJECTIVE & OBJECTIVE
Prescriptions Prior to Admission[1]    Review of patient's allergies indicates:   Allergen Reactions    Actifed plus      As a child    Erythromycin Other (See Comments)     unknown    Trazodone Palpitations       Past Medical History:   Diagnosis Date    Essential (primary) hypertension     Headache      Past Surgical History:   Procedure Laterality Date    APPENDECTOMY      BREAST LUMPECTOMY      right     SECTION      COLONOSCOPY      CYSTOSCOPY      HYSTERECTOMY      pain- MI/BSO    PELVIC LAPAROSCOPY      TONSILLECTOMY       Family History    None       Tobacco Use    Smoking status: Former     Current packs/day: 0.00     Types: Cigarettes     Quit date: 2018     Years since quittin.4    Smokeless tobacco: Never   Substance and Sexual Activity    Alcohol use: Never    Drug use: Never    Sexual activity: Yes     Partners: Male     Birth control/protection: See Surgical Hx     Comment:      Review of Systems   All other systems reviewed and are negative.    Objective:     Vital Signs (Most Recent):  Temp: 98.9 °F (37.2 °C) (25)  Pulse: 79 (25)  Resp: 18 (25)  BP: (!) 98/54 (25)  SpO2: 95 % (25) Vital Signs (24h Range):  Temp:  [97.6 °F (36.4 °C)-98.9 °F (37.2 °C)] 98.9 °F (37.2 °C)  Pulse:  [64-92] 79  Resp:  [13-33] 18  SpO2:  [95 %-100 %] 95 %  BP: ()/(51-89) 98/54     Weight: 79.8 kg (175 lb 14.8 oz)  Body mass index is 28.4 kg/m².      Physical Exam  Constitutional:       General: She is not in acute distress.     Appearance: She is obese.   HENT:      Mouth/Throat:      Mouth: Mucous membranes are dry.   Cardiovascular:      Rate and Rhythm: Normal rate.      Pulses: Normal pulses.      Comments: Palpable radial, femoral, popliteal, and pedal pulses bilaterally.   Non pulsatile aortic mass.   Epigastric fullness/distention.     Pulmonary:      Effort: Pulmonary effort is normal.   Abdominal:      General: There is  distension.      Tenderness: There is abdominal tenderness. There is no guarding.   Musculoskeletal:      Cervical back: Neck supple.   Skin:     General: Skin is warm.   Neurological:      Mental Status: She is alert and oriented to person, place, and time.      Sensory: No sensory deficit.      Motor: No weakness.          Significant Labs:  All pertinent labs from the last 24 hours have been reviewed.    Significant Diagnostics:  I have reviewed all pertinent imaging results/findings within the past 24 hours.       [1] (Not in a hospital admission)

## 2025-07-08 NOTE — ASSESSMENT & PLAN NOTE
Intermittent nausea, vomiting, and abd pain throughout stay. CTA A/P with gaseous distention. KUB with moderate stool burden.    -  on most recent EKG  - Antiemetics PRN; Zofran, phenergan, and droperidol given  - Bentyl IM x1 for suspected gas pain  - NGT placed for decompression in the setting of nausea with vomiting and inability to tolerate PO  - PRN EKG for QTC monitoring  - Strict I/Os  - Serial abd exams  - Bowel reg

## 2025-07-08 NOTE — HPI
This patient is a 60-year-old female past medical history significant for hypertension and hyperlipidemia presenting with 10 days of general malaise, anorexia, and chills.  She went out to lunch today and after trying to eat, had an exacerbation of these ongoing symptoms prompting her presentation to an outside hospital.  Enhanced imaging was obtained which demonstrated a aortic dissection in her paravisceral segment.  She was transferred to [facility] for further evaluation.  At bedside, she endorses a longstanding history of for that and GI illnesses.  Her chief complaint is nausea and epigastric pain.  She is maintained on aspirin, Zetia.  She is a former smoker. Denies back pain, chest pain, dyspnea. Denies sick contacts.

## 2025-07-08 NOTE — ASSESSMENT & PLAN NOTE
60-year-old female with PMHx of HTN, HLD, and neuropathy, who presents for aortic dissection type B with hypertensive emergency. Received cardene and esmolol drip at ED. Now off cardene drip. CTA: c/w  focal dissection along the right posterior margin of the aorta that extends for a length of approximately 2cm. Vascular Surgery initially consulted and suspects her dissection is subacute or chronic; is not a surgical candidate.    Admitted to the MICU for medical management of AAA dissection.    - Esmolol gtt; titrate to maintain HR < 80 and SBP < 140  - PO lopressor and amlodipine  - May require Cardene gtt again if BP becomes elevated  - PRN pain medications  - PRN anti-emetics  - Bentyl IM x1 for suspected gas pain (noted on CTA A/P)

## 2025-07-08 NOTE — H&P
[facility] - Medical ICU  Critical Care Medicine  History & Physical    Patient Name: Shelbie Santamaria  MRN: [id number]  Admission Date: 7/7/2025  Hospital Length of Stay: 1 days  Code Status: Full Code  Attending Physician: Chacorta Whittaker*   Primary Care Provider: Sunny Alexandre NP   Principal Problem: Aortic dissection    Subjective:     HPI:  Ms. Santamaria is a 60yoF with PMHx significant for HTN, HLD, and endometriosis who presented to the ED with c/o general malaise, anorexia, and chills x10 days. Per report, went to lunch today and after trying to eat, had an exacerbation of these ongoing symptoms prompting her presentation to an OSH. Imaging was obtained that demonstrated an aortic dissection in her paravisceral segment; chronicity unknown. She was transferred to [facility] for further evaluation and Vascular Surgery consult. States long-standing history of foregut and GI illnesses. Chief complaint upon exam in our ED was nausea and epigastric pain. Takes aspirin and Zetia at home and is also a former smoker. Denies back pain, chest pain, shortness of breath, or other complaints. Vascular consulted and deemed not surgical candidate; thought that AAA is either subacute or chronic. Pt. Was placed on esmolol gtt and cardene gtt but has since been weaned off cardene. Hemodynamic goals of SBP <140 and HR <80 per Vascular recs.    While in the ED, labs and imaging significant for WBC 14.18, H/H 12.9/37.9, plts 327, PTT 26.2, Pt 11.9, INR 1.1, Na 137, K 3.8, Cl 107, CO2 15, glucose 124, BUN 16, creatinine 0.7, ALP 41, AST/ALT 27/13, anion gap 15.    [facility] consulted for medical management of type B aortic dissection. Will admit to the MICU for further management.      Hospital/ICU Course:  No notes on file     Past Medical History:   Diagnosis Date    Essential (primary) hypertension     Headache        Past Surgical History:   Procedure Laterality Date    APPENDECTOMY      BREAST LUMPECTOMY       right     SECTION      COLONOSCOPY      CYSTOSCOPY      HYSTERECTOMY      pain- MI/BSO    PELVIC LAPAROSCOPY      TONSILLECTOMY         Review of patient's allergies indicates:   Allergen Reactions    Actifed plus      As a child    Erythromycin Other (See Comments)     unknown    Trazodone Palpitations       Family History    None       Tobacco Use    Smoking status: Former     Current packs/day: 0.00     Types: Cigarettes     Quit date: 2018     Years since quittin.4    Smokeless tobacco: Never   Substance and Sexual Activity    Alcohol use: Never    Drug use: Never    Sexual activity: Yes     Partners: Male     Birth control/protection: See Surgical Hx     Comment:         Objective:     Vital Signs (Most Recent):  Temp: 98.9 °F (37.2 °C) (25)  Pulse: 79 (25)  Resp: 20 (25)  BP: (!) 100/55 (25)  SpO2: 99 % (25) Vital Signs (24h Range):  Temp:  [97.6 °F (36.4 °C)-98.9 °F (37.2 °C)] 98.9 °F (37.2 °C)  Pulse:  [64-92] 79  Resp:  [13-33] 20  SpO2:  [95 %-100 %] 99 %  BP: ()/(50-89) 100/55   Weight: 79.8 kg (175 lb 14.8 oz)  Body mass index is 28.4 kg/m².      Intake/Output Summary (Last 24 hours) at 2025  Last data filed at 2025  Gross per 24 hour   Intake 199.43 ml   Output --   Net 199.43 ml          Physical Exam  Vitals and nursing note reviewed.   Constitutional:       General: She is awake. She is not in acute distress.     Appearance: She is ill-appearing.   HENT:      Mouth/Throat:      Mouth: Mucous membranes are moist.      Pharynx: Oropharynx is clear.   Eyes:      Extraocular Movements: Extraocular movements intact.      Pupils: Pupils are equal, round, and reactive to light.   Cardiovascular:      Rate and Rhythm: Normal rate and regular rhythm.      Pulses: Normal pulses.      Heart sounds: Normal heart sounds.      Comments: NSR  Pulmonary:      Effort: Pulmonary effort is normal. No tachypnea or  respiratory distress.      Breath sounds: Normal breath sounds. No wheezing or rhonchi.   Abdominal:      General: There is distension.      Palpations: Abdomen is soft.      Tenderness: There is no abdominal tenderness.      Comments: Intermittent nausea   Musculoskeletal:         General: Normal range of motion.      Right lower leg: No edema.      Left lower leg: No edema.   Skin:     General: Skin is warm and dry.      Capillary Refill: Capillary refill takes less than 2 seconds.      Coloration: Skin is pale.   Neurological:      General: No focal deficit present.      Mental Status: She is alert and oriented to person, place, and time. Mental status is at baseline.      GCS: GCS eye subscore is 4. GCS verbal subscore is 5. GCS motor subscore is 6.   Psychiatric:         Attention and Perception: Attention normal.         Mood and Affect: Mood normal.         Speech: Speech normal.         Behavior: Behavior normal. Behavior is cooperative.         Thought Content: Thought content normal.         Judgment: Judgment normal.          Vents:     Lines/Drains/Airways       Peripheral Intravenous Line  Duration             Peripheral IV Single Lumen 07/07/25 1740 18 G Anterior;Left;Proximal Forearm <1 day                  Significant Labs:    CBC/Anemia Profile:  Recent Labs   Lab 07/07/25  1217 07/07/25  1754   WBC 11.18 14.18*   HGB 14.0 12.9   HCT 40.6 37.9    327   MCV 85 85   RDW 13.6 13.6        Chemistries:  Recent Labs   Lab 07/07/25  1217 07/07/25  1754    137   K 3.4* 3.8    107   CO2 20* 15*   BUN 20 16   CREATININE 1.0 0.7   CALCIUM 10.3 9.6   ALBUMIN 4.4 4.5   PROT 8.4 8.3   BILITOT 0.6 0.6   ALKPHOS 39* 41   ALT 15 13   AST 19 27   MG 1.9  --        All pertinent labs within the past 24 hours have been reviewed.    Significant Imaging: I have reviewed all pertinent imaging results/findings within the past 24 hours.  Assessment/Plan:     Cardiac/Vascular  * Aortic  dissection  60-year-old female with PMHx of HTN, HLD, and neuropathy, who presents for aortic dissection type B with hypertensive emergency. Received cardene and esmolol drip at ED. Now off cardene drip. CTA: c/w  focal dissection along the right posterior margin of the aorta that extends for a length of approximately 2cm. Vascular Surgery initially consulted and suspects her dissection is subacute or chronic; is not a surgical candidate.    Admitted to the MICU for medical management of AAA dissection.    - Esmolol gtt; titrate to maintain HR < 80 and SBP < 140  - PO lopressor and amlodipine  - May require Cardene gtt again if BP becomes elevated  - PRN pain medications  - PRN anti-emetics  - Bentyl IM x1 for suspected gas pain (noted on CTA A/P)    HTN (hypertension)  Takes amlodipine and metoprolol at baseline.    - Continue home medications  - Telemetry    HLD (hyperlipidemia)  Takes fenofibrate and ezetimibe at baseline.    - Continue home medications    Renal/  Hypokalemia  On admit, K 3.3. Likely s/t n/v.    - Replace PRN to maintain >4    GI  Nausea & vomiting  Intermittent nausea, vomiting, and abd pain throughout stay. CTA A/P with gaseous distention. KUB with moderate stool burden.    -  on most recent EKG  - Antiemetics PRN; Zofran, phenergan, and droperidol given  - Bentyl IM x1 for suspected gas pain  - NGT placed for decompression in the setting of nausea with vomiting and inability to tolerate PO  - PRN EKG for QTC monitoring  - Strict I/Os  - Serial abd exams  - Bowel reg        Critical Care Daily Checklist:    A: Awake: RASS Goal/Actual Goal:    Actual:     B: Spontaneous Breathing Trial Performed?     C: SAT & SBT Coordinated?  N/A                      D: Delirium: CAM-ICU     E: Early Mobility Performed? No   F: Feeding Goal:    Status:     Current Diet Order   Procedures    Diet NPO      AS: Analgesia/Sedation PRNs   T: Thromboembolic Prophylaxis Lovenox SQ   H: HOB > 300 Yes   U:  Stress Ulcer Prophylaxis (if needed) PPI   G: Glucose Control 140-180, LD SSI   B: Bowel Function     I: Indwelling Catheter (Lines & Tom) Necessity PIVs   D: De-escalation of Antimicrobials/Pharmacotherapies N/A    Plan for the day/ETD Admit to the MICU for medical management of AAA    Code Status:  Family/Goals of Care: Full Code  Family (spouse) updated at bedside     Critical Care Time: 70 minutes  Critical secondary to Aortic dissection, HTN, N/V.    Discussed with CCM fellow. Full attestation from CCM attending to follow.    Critical care was time spent personally by me on the following activities: development of treatment plan with patient or surrogate and bedside caregivers, discussions with consultants, evaluation of patient's response to treatment, examination of patient, ordering and performing treatments and interventions, ordering and review of laboratory studies, ordering and review of radiographic studies, pulse oximetry, re-evaluation of patient's condition. This critical care time did not overlap with that of any other provider or involve time for any procedures.     Quynh Walter, Redwood LLCLYNNE-BC  Critical Care Medicine  [facility] - Medical ICU

## 2025-07-08 NOTE — PLAN OF CARE
[facility] - Medical ICU  Initial Discharge Assessment       Primary Care Provider: Sunny Alexandre NP    Admission Diagnosis: Aortic dissection [I71.00]    Admission Date: 7/7/2025  Expected Discharge Date: 7/11/2025    Transition of Care Barriers: None    Payor: BLUE CROSS BLUE SHIELD / Plan: [place] HMO / Product Type: HMO /     Extended Emergency Contact Information  Primary Emergency Contact: Jed Santamaria  Address: [address] [place] [address] [place]  Mobile Phone: [phone]  Relation: Significant other    Discharge Plan A: Home with family  Discharge Plan B: Home Health, Home with family      [facility] - [place], [place] - [address]  [address]  [place] [address]  Phone: [phone] Fax: [phone]    [id number] @ [facility], [place] - [address]  [address]  [place] [id number]  [facility] [address]  Phone: [phone] Fax: [phone]      Initial Assessment (most recent)       Adult Discharge Assessment - 07/08/25 1528          Discharge Assessment    Assessment Type Discharge Planning Assessment     Confirmed/corrected address, phone number and insurance Yes     Confirmed Demographics Correct on Facesheet     Source of Information patient     Communicated ANNABELLE with patient/caregiver Yes     People in Home child(nory), adult;significant other;grandchild(nory)     Do you expect to return to your current living situation? Yes     Do you have help at home or someone to help you manage your care at home? Yes     Who are your caregiver(s) and their phone number(s)? spouse Jed     Prior to hospitilization cognitive status: Alert/Oriented     Current cognitive status: Alert/Oriented     Home Layout Able to live on 1st floor     Equipment Currently Used at Home blood pressure machine     Do you currently have service(s) that help you manage your care at home? No     Do you take prescription  medications? Yes     Do you have prescription coverage? Yes     Do you have any problems affording any of your prescribed medications? No     Is the patient taking medications as prescribed? yes     How do you get to doctors appointments? car, drives self     Are you on dialysis? No     Do you take coumadin? No     Discharge Plan A Home with family     Discharge Plan B Home Health;Home with family     DME Needed Upon Discharge  none     Discharge Plan discussed with: Patient     Transition of Care Barriers None                   Patient lives with spouse, son, daughter in law, and grandson in a single story home with entry ramp from [facility]. Patient does not feel she will need any post acute services upon hospital discharge. Patient spouse is primary caregiver and he will assist tin pt care once home. He will also provide transportation home.

## 2025-07-08 NOTE — SUBJECTIVE & OBJECTIVE
Past Medical History:   Diagnosis Date    Essential (primary) hypertension     Headache        Past Surgical History:   Procedure Laterality Date    APPENDECTOMY      BREAST LUMPECTOMY      right     SECTION      COLONOSCOPY      CYSTOSCOPY      HYSTERECTOMY      pain- MI/BSO    PELVIC LAPAROSCOPY      TONSILLECTOMY         Review of patient's allergies indicates:   Allergen Reactions    Actifed plus      As a child    Erythromycin Other (See Comments)     unknown    Trazodone Palpitations       Family History    None       Tobacco Use    Smoking status: Former     Current packs/day: 0.00     Types: Cigarettes     Quit date: 2018     Years since quittin.4    Smokeless tobacco: Never   Substance and Sexual Activity    Alcohol use: Never    Drug use: Never    Sexual activity: Yes     Partners: Male     Birth control/protection: See Surgical Hx     Comment:         Objective:     Vital Signs (Most Recent):  Temp: 98.9 °F (37.2 °C) (25)  Pulse: 79 (25)  Resp: 20 (25)  BP: (!) 100/55 (25)  SpO2: 99 % (25) Vital Signs (24h Range):  Temp:  [97.6 °F (36.4 °C)-98.9 °F (37.2 °C)] 98.9 °F (37.2 °C)  Pulse:  [64-92] 79  Resp:  [13-33] 20  SpO2:  [95 %-100 %] 99 %  BP: ()/(50-89) 100/55   Weight: 79.8 kg (175 lb 14.8 oz)  Body mass index is 28.4 kg/m².      Intake/Output Summary (Last 24 hours) at 2025  Last data filed at 2025  Gross per 24 hour   Intake 199.43 ml   Output --   Net 199.43 ml          Physical Exam  Vitals and nursing note reviewed.   Constitutional:       General: She is awake. She is not in acute distress.     Appearance: She is ill-appearing.   HENT:      Mouth/Throat:      Mouth: Mucous membranes are moist.      Pharynx: Oropharynx is clear.   Eyes:      Extraocular Movements: Extraocular movements intact.      Pupils: Pupils are equal, round, and reactive to light.   Cardiovascular:      Rate and  Rhythm: Normal rate and regular rhythm.      Pulses: Normal pulses.      Heart sounds: Normal heart sounds.      Comments: NSR  Pulmonary:      Effort: Pulmonary effort is normal. No tachypnea or respiratory distress.      Breath sounds: Normal breath sounds. No wheezing or rhonchi.   Abdominal:      General: There is distension.      Palpations: Abdomen is soft.      Tenderness: There is no abdominal tenderness.      Comments: Intermittent nausea   Musculoskeletal:         General: Normal range of motion.      Right lower leg: No edema.      Left lower leg: No edema.   Skin:     General: Skin is warm and dry.      Capillary Refill: Capillary refill takes less than 2 seconds.      Coloration: Skin is pale.   Neurological:      General: No focal deficit present.      Mental Status: She is alert and oriented to person, place, and time. Mental status is at baseline.      GCS: GCS eye subscore is 4. GCS verbal subscore is 5. GCS motor subscore is 6.   Psychiatric:         Attention and Perception: Attention normal.         Mood and Affect: Mood normal.         Speech: Speech normal.         Behavior: Behavior normal. Behavior is cooperative.         Thought Content: Thought content normal.         Judgment: Judgment normal.          Vents:     Lines/Drains/Airways       Peripheral Intravenous Line  Duration             Peripheral IV Single Lumen 07/07/25 1740 18 G Anterior;Left;Proximal Forearm <1 day                  Significant Labs:    CBC/Anemia Profile:  Recent Labs   Lab 07/07/25  1217 07/07/25  1754   WBC 11.18 14.18*   HGB 14.0 12.9   HCT 40.6 37.9    327   MCV 85 85   RDW 13.6 13.6        Chemistries:  Recent Labs   Lab 07/07/25  1217 07/07/25  1754    137   K 3.4* 3.8    107   CO2 20* 15*   BUN 20 16   CREATININE 1.0 0.7   CALCIUM 10.3 9.6   ALBUMIN 4.4 4.5   PROT 8.4 8.3   BILITOT 0.6 0.6   ALKPHOS 39* 41   ALT 15 13   AST 19 27   MG 1.9  --        All pertinent labs within the past 24  hours have been reviewed.    Significant Imaging: I have reviewed all pertinent imaging results/findings within the past 24 hours.

## 2025-07-08 NOTE — CONSULTS
"[facility] - Emergency Dept  Vascular Surgery  Consult Note    Inpatient consult to Vascular Surgery  Consult performed by: Adebayo Camargo MD  Consult ordered by: Rao Lombardi MD        Subjective:     Reason for consult: "Dissection"    History of Present Illness: This patient is a 60-year-old female past medical history significant for hypertension, hyperlipidemia, endometriosis presenting with 10 days of general malaise, anorexia, and chills.  She went out to lunch today and after trying to eat, had an exacerbation of these ongoing symptoms prompting her presentation to an outside hospital.  Enhanced imaging was obtained which demonstrated an aortic dissection in her paravisceral segment, chronicity unknown.  She was transferred to [facility] for further evaluation.  At bedside, she endorses a longstanding history of foregut and GI illnesses.  Her chief complaint is nausea and epigastric pain.  She is maintained on aspirin, Zetia.  She is a former smoker. Denies back pain, chest pain, dyspnea. Denies sick contacts.    Review of Systems   All other systems reviewed and are negative.    Objective:     Vital Signs (Most Recent):  Temp: 98.9 °F (37.2 °C) (07/07/25 1901)  Pulse: 79 (07/07/25 1952)  Resp: 18 (07/07/25 1952)  BP: (!) 98/54 (07/07/25 1952)  SpO2: 95 % (07/07/25 1952) Vital Signs (24h Range):  Temp:  [97.6 °F (36.4 °C)-98.9 °F (37.2 °C)] 98.9 °F (37.2 °C)  Pulse:  [64-92] 79  Resp:  [13-33] 18  SpO2:  [95 %-100 %] 95 %  BP: ()/(51-89) 98/54     Weight: 79.8 kg (175 lb 14.8 oz)  Body mass index is 28.4 kg/m².      Physical Exam  Constitutional:       General: She is not in acute distress.     Appearance: She is obese.   HENT:      Mouth/Throat:      Mouth: Mucous membranes are dry.   Cardiovascular:      Rate and Rhythm: Normal rate.      Pulses: Normal pulses.      Comments:   Palpable radial, femoral, popliteal, and pedal pulses bilaterally.   Non pulsatile aortic mass.   Epigastric " fullness/distention.     Pulmonary:      Effort: Pulmonary effort is normal.   Abdominal:      General: There is distension.      Tenderness: There is abdominal tenderness. There is no guarding.   Musculoskeletal:      Cervical back: Neck supple.   Skin:     General: Skin is warm.   Neurological:      Mental Status: She is alert and oriented to person, place, and time.      Sensory: No sensory deficit.      Motor: No weakness.       Significant Labs:  All pertinent labs from the last 24 hours have been reviewed.    Significant Diagnostics:  I have reviewed all pertinent imaging results/findings within the past 24 hours.    Assessment/Plan:     Duodenitis  60F with likely incidental discovery of short segment paravisceral aortic dissection w/o malperfusion with concomitant massive gastric distention, antral and duodenal inflammation in patient with long standing GI/enteral complaints and new foregut symptoms.     Suspect her severe gastric dissection is cause of pain, not her dissection, which may be chronic.   Recommend ASA with BP goal <140, HR <80. May resume oral anti hypertensives.   Consider nasogastric decompression    No plan for further imaging, intervention from vascular perspective at this time.         Thank you for your consult.     Adebayo Camargo MD  Vascular Surgery Fellow  [facility] - Emergency Dept

## 2025-07-08 NOTE — EICU
Virtual ICU Quality Rounds    Admit Date: 7/7/2025  Hospital Day: 1    ICU Day: 9h    24H Vital Sign Range:  Temp:  [97.6 °F (36.4 °C)-98.9 °F (37.2 °C)]   Pulse:  [64-93]   Resp:  [13-33]   BP: ()/(48-89)   SpO2:  [95 %-100 %]     VICU Surveillance Screening                    LDA reconciliation : Yes

## 2025-07-08 NOTE — HPI
Ms. Santamaria is a 60yoF with PMHx significant for HTN, HLD, and endometriosis who presented to the ED with c/o general malaise, anorexia, and chills x10 days. Per report, went to lunch today and after trying to eat, had an exacerbation of these ongoing symptoms prompting her presentation to an OSH. Imaging was obtained that demonstrated an aortic dissection in her paravisceral segment; chronicity unknown.   She was transferred to [facility] for further evaluation and Vascular Surgery consult. States long-standing history of foregut and GI illnesses. Chief complaint upon exam in our ED was nausea and epigastric pain. Takes aspirin and Zetia at home and is also a former smoker. Denies back pain, chest pain, shortness of breath, or other complaints. Vascular consulted and deemed not surgical candidate; thought that AAA is either subacute or chronic. Pt. Was placed on esmolol gtt and cardene gtt. Hemodynamic goals of SBP <140 and HR <80 per Vascular recs.    While in the ED, labs and imaging significant for WBC 14.18, H/H 12.9/37.9, plts 327, PTT 26.2, Pt 11.9, INR 1.1, Na 137, K 3.8, Cl 107, CO2 15, glucose 124, BUN 16, creatinine 0.7, ALP 41, AST/ALT 27/13, anion gap 15.    [facility] consulted for medical management of type B aortic dissection. Will admit to the MICU for further management.

## 2025-07-08 NOTE — ED NOTES
Patient given water PO as part of bedside swallow eval. Passed swallow evaluation w/o issue, however shortly after oral intake pt endorsing return of 10/10 cramping, burning abdominal pain and nausea.   [facility] NP notified, order placed for PRN IVP dilaudid, scheduled IV protonix, and one-time GI cocktail. Esmolol gtt changed to titrating gtt. IVP dilaudid and GI cocktail given- unfortunately patient vomited up ~1/2 of administered GI cocktail.   Orders placed for one-time dose IVP phenergan and PRN IV zofran. Will administer IV phenergan and hold off on any further PO medication until nausea more under control.

## 2025-07-08 NOTE — ASSESSMENT & PLAN NOTE
60-year-old female that reports nausea and epigastric pain x ~10 days. CT c/w short segment paravisceral aortic dissection w/o malperfusion with concomitant massive gastric distention, antral and duodenal inflammation in patient with long standing GI/enteral complaints and new foregut symptoms.     - Attempted GI cocktail but unfortunately vomited shortly afterward  - Dilaudid PRN for pain  - PPI daily

## 2025-07-08 NOTE — ASSESSMENT & PLAN NOTE
60F with likely incidental discovery of short segment paravisceral aortic dissection w/o malperfusion with concomitant massive gastric distention, antral and duodenal inflammation in patient with long standing GI/enteral complaints and new foregut symptoms.     - Suspect her severe gastric dissection is cause of pain, not her dissection, which may be chronic.  - NG tube in place to decompress  - Recommend ASA with BP goal <140, HR <80. May resume oral anti hypertensives.    No plan for further imaging, intervention from vascular perspective at this time.

## 2025-07-08 NOTE — ED PROVIDER NOTES
Encounter Date: 07/07/2025       Chief Complaint   transfer (Transfer )      History Of Present Illness     Shelbie Santamaria is a 60 y.o. female w/ a PMHx significant for hypertension, hyperlipidemia, endometriosis presenting as a transfer for evaluation of aortic dissection.  Prior to transfer and EN route patient maxed on esmolol IV and required frequent up titration of Cardene drip to maintain SBP <120 and HR <80. No additional complications reported en route. Patient reports 10 days of general malaise, anorexia, and chills.  She went out to lunch today and after trying to eat, had an exacerbation of these ongoing symptoms prompting her presentation to an outside hospital.  Enhanced imaging was obtained which demonstrated an aortic dissection. She was transferred to [facility] for further evaluation. Her chief complaint at this time is nausea and epigastric pain. Patient denies headaches, vision changes, CP, SOB, N/V/D, dysuria/hematuria, lower extremity edema/erythema/tenderness.    Please see MDM for additional details.      Past History   As per HPI and below:  Past Medical History[1]  Past Surgical History[2]  Medications Ordered Prior to Encounter[3]    Social History[4]  family history is not on file.   Review of patient's allergies indicates:   Allergen Reactions    Actifed plus      As a child    Erythromycin Other (See Comments)     unknown    Trazodone Palpitations       Physical Exam     Vitals:    07/07/25 2230 07/07/25 2245 07/07/25 2300 07/07/25 2322   BP: (!) 98/48 (!) 93/53 (!) 91/50 (!) 116/59   BP Location:       Patient Position:       Pulse: 87 88 81 81   Resp: (!) 24 (!) 22  (!) 23   Temp:       TempSrc:       SpO2: 99% 96% 97% 100%   Weight:         Physical Exam  Vitals and nursing note reviewed.   Constitutional:       General: She is in acute distress.      Appearance: Normal appearance. She is ill-appearing.   HENT:      Head: Normocephalic and atraumatic.      Nose: Nose  normal.      Mouth/Throat:      Mouth: Mucous membranes are moist.      Pharynx: Oropharynx is clear.   Eyes:      Extraocular Movements: Extraocular movements intact.      Conjunctiva/sclera: Conjunctivae normal.   Cardiovascular:      Rate and Rhythm: Normal rate.      Pulses: Normal pulses.   Pulmonary:      Effort: Pulmonary effort is normal. No respiratory distress.      Breath sounds: Normal breath sounds.   Abdominal:      General: There is no distension.      Palpations: There is no mass.      Tenderness: There is abdominal tenderness.   Musculoskeletal:         General: No swelling, tenderness or signs of injury.      Cervical back: Normal range of motion.   Skin:     General: Skin is warm and dry.      Capillary Refill: Capillary refill takes less than 2 seconds.      Coloration: Skin is not jaundiced or pale.      Findings: No bruising or lesion.   Neurological:      General: No focal deficit present.      Mental Status: She is alert and oriented to person, place, and time.      Sensory: No sensory deficit.      Motor: No weakness.            Medications Given     Medications   metoprolol tartrate (LOPRESSOR) tablet 50 mg (has no administration in time range)   amLODIPine tablet 5 mg (has no administration in time range)   aspirin chewable tablet 81 mg (has no administration in time range)   sodium chloride 0.9% flush 10 mL (has no administration in time range)   esmolol 2000 mg in sodium chloride 0.9% 100 mL (20 mg/mL) (200 mcg/kg/min × 79.8 kg Intravenous Rate/Dose Change 7/7/25 2321)   HYDROmorphone injection 0.2 mg (0.2 mg Intravenous Given 7/7/25 2212)   pantoprazole injection 40 mg (40 mg Intravenous Given 7/7/25 2213)   DULoxetine DR capsule 60 mg (has no administration in time range)   ezetimibe tablet 10 mg (has no administration in time range)   fenofibrate tablet 160 mg (has no administration in time range)   omega-3 acid ethyl esters capsule 1 g (has no administration in time range)    ondansetron injection 4 mg (has no administration in time range)   HYDROmorphone injection 1 mg (1 mg Intravenous Given 7/7/25 1758)   ondansetron injection 4 mg (4 mg Intravenous Given 7/7/25 1757)   iohexoL (OMNIPAQUE 350) injection 75 mL (75 mLs Intravenous Given 7/7/25 1815)   aluminum-magnesium hydroxide-simethicone 200-200-20 mg/5 mL suspension 30 mL (30 mLs Oral Given 7/7/25 2215)     And   LIDOcaine viscous HCl 2% oral solution 15 mL (15 mLs Oral Given 7/7/25 2215)   promethazine 12.5 mg in 0.9% NaCl 50 mL IVPB (12.5 mg Intravenous New Bag 7/7/25 2315)       Labs & Imaging     Labs Reviewed   COMPREHENSIVE METABOLIC PANEL - Abnormal       Result Value    Sodium 137      Potassium 3.8      Chloride 107      CO2 15 (*)     Glucose 124 (*)     BUN 16      Creatinine 0.7      Calcium 9.6      Protein Total 8.3      Albumin 4.5      Bilirubin Total 0.6      ALP 41      AST 27      ALT 13      Anion Gap 15      eGFR >60     CBC WITH DIFFERENTIAL - Abnormal    WBC 14.18 (*)     RBC 4.44      HGB 12.9      HCT 37.9      MCV 85      MCH 29.1      MCHC 34.0      RDW 13.6      Platelet Count 327      MPV 10.5      Nucleated RBC 0      Neut % 85.7 (*)     Lymph % 11.1 (*)     Mono % 2.8 (*)     Eos % 0.0      Basophil % 0.1      Imm Grans % 0.3      Neut # 12.16 (*)     Lymph # 1.57      Mono # 0.39      Eos # 0.00      Baso # 0.02      Imm Grans # 0.04     B-TYPE NATRIURETIC PEPTIDE - Abnormal     (*)    PROTIME-INR - Normal    PT 11.9      INR 1.1     APTT - Normal    PTT 26.2     LACTIC ACID, PLASMA - Normal    Lactic Acid Level 0.9      Narrative:     Falsely low lactic acid results can be found in samples containing >=13.0 mg/dL total bilirubin and/or >=3.5 mg/dL direct bilirubin.    TROPONIN I HIGH SENSITIVITY - Normal    Troponin High Sensitive 9     CBC W/ AUTO DIFFERENTIAL    Narrative:     The following orders were created for panel order CBC auto differential.                  Procedure                                Abnormality         Status                                     ---------                               -----------         ------                                     CBC with Differential[[id number]]       Abnormal            Final result                                                 Please view results for these tests on the individual orders.   TYPE & SCREEN    Specimen Outdate 07/10/2025 23:59      Group & Rh A POS      Indirect Court NEG         Imaging Results              CTA Abdomen and Pelvis (Final result)  Result time 07/07/25 20:57:42      Final result by Jeremi Perkins MD (07/07/25 20:57:42)                   Impression:      1. Redemonstration of dissection along the posterior aspect of the aorta, with focal aneurysmal dilation along the right lateral aspect of the aorta.  No significant change from the recent prior CTA 07/07/2025.  2. Additional findings as detailed above.    Electronically signed by resident: Ze Hunter  Date:    07/07/2025  Time:    18:32    Electronically signed by: Jeremi Perkins  Date:    07/07/2025  Time:    20:57               Narrative:    EXAMINATION:  CTA ABDOMEN AND PELVIS    CLINICAL HISTORY:  Aortic aneurysm (AAA), pre-op planning;    TECHNIQUE:  Using 75 cc of  Omnipaque 350 IV contrast, and multi-detector helical CT technique, axial CT angiogram images of the abdomen were obtained from the lung bases through the pelvis. Noncontrast and portal venous phase images of the abdomen and pelvis also obtained.  Coronal and sagittal reconstructions performed.    COMPARISON:  CTA 07/07/2025    FINDINGS:  Heart: Normal in size. No pericardial effusion.    Lung bases: Symmetrically expanded. No consolidation, pleural effusion, mass, or pneumothorax.    Liver: Normal in size and attenuation without focal hepatic abnormality.    Gallbladder: Normal in appearance without evidence for cholecystitis.    Bile Ducts: No intra or extrahepatic biliary ductal  dilation.    Pancreas: No pancreatic mass lesion or peripancreatic inflammatory change.    Spleen: Unremarkable.    Adrenals: Unremarkable.    Kidneys/ Ureters: The kidneys are normal in size and location. Kidneys enhance normally. No focal renal abnormality.  Contrast within the collecting system limits evaluation for renal stones.  No hydroureteronephrosis bilaterally.    Bladder: The bladder is distended.  No perivesicular inflammatory change.  No bladder wall thickening.    Reproductive organs: Hysterectomy.    GI Tract/Mesentery: The stomach is unremarkable.  Visualized loops of small and large bowel are normal in caliber without evidence for obstruction or inflammation. The appendix is unremarkable.    Peritoneal Space: No intraperitoneal free fluid or free air. No pathologically enlarged lymph nodes.    Retroperitoneum: No significant adenopathy.    Abdominal wall/extraperitoneal soft tissues: Unremarkable.    Vasculature: Redemonstration of focal dissection along the posterior aspect of the abdominal aorta just beyond the takeoff of the renal arteries that extends approximately 1.5 cm (series 602, image 116).  Along the inferior aspect of the dissection flap is a takeoff of two lumbar arteries.  At the inferior margin of this focal dissection, there is a focal aneurysmal saccular outpouching of contrast along the right lateral margin of the aorta protruding approximately 1.4 cm laterally over a 2.8 cm longitudinal interval.  Maximum diameter of the aorta at this level is 3.1 cm.  This could represent an atherosclerotic ulcer.  On noncontrast images the attenuation of this focal outpouching is similar to that of blood pool.  There is no inflammatory change along the aorta at this level.  A lumbar artery arises along the inferior margin of this focal outpouching.  The appearance of the abdominal aorta is overall similar when compared to recent CTA 07/07/2025, noting no expansion, or new pathology of the  abdominal aorta.  There is moderate calcific and noncalcific atherosclerotic plaque throughout the abdominal aorta and its branches.  There is stenosis of the celiac, SMA, bilateral renal arteries, and ARSALAN origins, noting these vessels remain patent.    Bones: Degenerative changes without acute fracture or bone destructive process.                                      FARHANA Santamaria is a 60 y.o. female who presents to the emergency department as a transfer for evaluation of aortic dissection, chronicity unknown as detailed in HPI. On exam, patient in acute distress 2/2 pain.  She is hypertensive and guarding progressively increased to 12.5.  Dilaudid given for pain.  Following this, patient's blood pressure dropped into 80s/50s.  At that time, Cardene paused and esmolol continued.  Patient's blood pressure did progressively improve but she remains slightly hypotensive.  She has epigastric tenderness to palpation.  She is not diaphoretic or pale.  Peripheral pulses intact.  No pulsatile abdominal mass appreciated.  Otherwise, physical exam largely unremarkable.  Heart rate hovering in the 80s.  Otherwise VS remaining stable.  Vascular surgery consulted, who believe that this dissection is likely chronic and instead favor acute GI illness being responsible for patient's current symptoms.  Vascular surgery has signed off.  Patient was given a small amount of water, which exacerbated her abdominal pain increasing suspicion for GI-related illness v. progression of dissection or other vascular pathology.  MICU consulted, who plan to admit patient for blood pressure control, heart rate control, and further monitoring.     Labs (independently interpreted by myself):  No labs obtained in [facility] ED. at outside facility, patient had no leukocytosis, anemia, metabolic derangements, electrolyte derangements, or abnl findings on urinalysis.    Imaging (independently interpreted by myself): CTA abdomen and pelvis  shows redemonstration of dissection along the posterior aspect of aorta w/aneurysmal dilation w/o significant change from CVA prior to transport.  Patient also has significantly distended stomach and questionable duodenitis.  On review of imaging by both vascular surgery and Radiology, the suspicion for acute/worsening dissection is slightly lowered and concerns of GI illness increased.     Ddx including, but not limited to: Aortic dissection v. aneurysm v. ACS v. mesenteric ischemia v. acute gastro enteritis v. gastritis v. biliary tract pathology v. pancreatitis v. hepatitis      Most likely Dx:  At this time, I do have high suspicion for aortic dissection.  However, chronicity is unknown and based on input from consultants likely not acute w/o evidence of expansion/ongoing bleeding.  I do have suspicion for gastritis/gastroenteritis given patient's history, labs, physical exam, imaging, symptomatic worsening w/ a p.o. intake, and response to treatment.    Disposition: Patient will be admitted. Discussed reason for admission and plan with patient and/or caregiver who expressed understanding and agreement.    Please see HPI, physical exam, ED course for additional details.    Procedures   Final diagnoses:  [I71.00] Aortic dissection      ED Disposition Condition    Admit             Noel Burnett MD         [1]   Past Medical History:  Diagnosis Date    Essential (primary) hypertension     Headache    [2]   Past Surgical History:  Procedure Laterality Date    APPENDECTOMY      BREAST LUMPECTOMY      right     SECTION      COLONOSCOPY      CYSTOSCOPY      HYSTERECTOMY      pain- MI/BSO    PELVIC LAPAROSCOPY      TONSILLECTOMY     [3]   Current Facility-Administered Medications on File Prior to Encounter   Medication Dose Route Frequency Provider Last Rate Last Admin    [COMPLETED] 0.9% NaCl infusion  1,000 mL Intravenous ED 1 Time Angeli Stapleton NP   Stopped at [id number]    [COMPLETED]  aluminum-magnesium hydroxide-simethicone 200-200-20 mg/5 mL suspension 30 mL  30 mL Oral Once Angeli Stapleton NP   30 mL at 07/07/25 1312    And    [COMPLETED] LIDOcaine viscous HCl 2% oral solution 15 mL  15 mL Oral Once Angeli Stapleton NP   15 mL at 07/07/25 1312    [COMPLETED] HYDROmorphone injection 0.5 mg  0.5 mg Intravenous ED 1 Time Angeli Stapleton NP   0.5 mg at 07/07/25 1324    [COMPLETED] HYDROmorphone injection 0.5 mg  0.5 mg Intravenous ED 1 Time Angeli Stapleton NP   0.5 mg at 07/07/25 1442    [COMPLETED] HYDROmorphone injection 1 mg  1 mg Intravenous ED 1 Time Angeli Stapleton NP   1 mg at 07/07/25 1538    [COMPLETED] HYDROmorphone injection 1 mg  1 mg Intravenous ED 1 Time Sushil Marvin MD   1 mg at 07/07/25 1640    [COMPLETED] iohexoL (OMNIPAQUE 350) injection 75 mL  75 mL Intravenous ONCE PRN Sushil Marvin MD   75 mL at 07/07/25 1405    [COMPLETED] metoclopramide injection 10 mg  10 mg Intravenous ED 1 Time Angeli Stapleton NP   10 mg at 07/07/25 1619    [COMPLETED] morphine injection 2 mg  2 mg Intravenous ED 1 Time Angeli Stapleton NP   2 mg at 07/07/25 1233    [COMPLETED] ondansetron injection 4 mg  4 mg Intravenous ED 1 Time Angeli Stapleton NP   4 mg at 07/07/25 1218    [COMPLETED] ondansetron injection 4 mg  4 mg Intravenous ED 1 Time Angeli Stapleton NP   4 mg at 07/07/25 1350    [DISCONTINUED] esmolol 2000 mg in sodium chloride 0.9% 100 mL (20 mg/mL)  0-300 mcg/kg/min Intravenous Continuous Sushil Marvin MD 71.8 mL/hr at 07/07/25 1627 300 mcg/kg/min at 07/07/25 1627    [DISCONTINUED] niCARdipine 40 mg/200 mL (0.2 mg/mL) infusion  0-15 mg/hr Intravenous Continuous Sushil Marvin MD   Stopped at 07/07/25 1600    [DISCONTINUED] niCARdipine 40 mg/200 mL (0.2 mg/mL) infusion  0-15 mg/hr Intravenous Continuous Sushil Marvin MD 37.5 mL/hr at 07/07/25 1627 7.5 mg/hr at 07/07/25 1627     Current Outpatient Medications on File Prior to Encounter   Medication Sig Dispense  Refill    aspirin (ECOTRIN) 81 MG EC tablet Take 81 mg by mouth once daily.      augmented betamethasone dipropionate (DIPROLENE-AF) 0.05 % cream Apply topically 2 (two) times daily. Back of head/neck      cetirizine (ZYRTEC) 10 MG tablet Take 10 mg by mouth once daily.      cholecalciferol, vitamin D3, 125 mcg (5,000 unit) Tab Take 5,000 Units by mouth 2 (two) times daily.      clobetasol 0.05% (TEMOVATE) 0.05 % Oint Apply topically 2 (two) times daily. 45 g 0    DULoxetine (CYMBALTA) 60 MG capsule Take 1 capsule (60 mg total) by mouth once daily. 90 capsule 1    estradioL (ESTRACE) 0.01 % (0.1 mg/gram) vaginal cream Place 1 g vaginally twice a week. 42.5 g 2    eszopiclone (LUNESTA) 3 mg Tab Take 3 mg by mouth every evening.      ezetimibe (ZETIA) 10 mg tablet Take 10 mg by mouth nightly.  4    fenofibrate 160 MG Tab Take 160 mg by mouth once daily.      fish oil-omega-3 fatty acids 300-1,000 mg capsule Take 1 capsule by mouth 2 (two) times daily.      furosemide (LASIX) 40 MG tablet furosemide 40 mg tablet   TAKE 1 TABLET BY MOUTH EVERY DAY IN THE MORNING.      ketoconazole (NIZORAL) 2 % cream Apply 1 application topically daily as needed. Use prn for rash      methocarbamoL (ROBAXIN) 750 MG Tab Take 1 tablet (750 mg total) by mouth 3 (three) times daily as needed (leg cramping). 270 tablet 1    metoprolol tartrate (LOPRESSOR) 50 MG tablet Take 50 mg by mouth 2 (two) times daily.       potassium chloride SA (K-DUR,KLOR-CON) 10 MEQ tablet Take 10 mEq by mouth once daily.       prochlorperazine (COMPAZINE) 10 MG tablet Take 1 tablet (10 mg total) by mouth daily as needed (headache/nausea). Don't take with Zofran or Phenergan (Patient not taking: Reported on 2025) 10 tablet 5   [4]   Social History  Tobacco Use    Smoking status: Former     Current packs/day: 0.00     Types: Cigarettes     Quit date: 2018     Years since quittin.4    Smokeless tobacco: Never   Substance Use Topics    Alcohol use: Never     Drug use: Never        Noel Burnett MD  Resident  07/07/25 0892

## 2025-07-08 NOTE — CONSULTS
Patient seen and evaluated by critical care medicine. To be admitted to ICU for further management. Full H&P to follow.     JONN Johnston-BC  Pulmonary Critical Care  07/07/2025

## 2025-07-09 PROBLEM — R10.9 ABDOMINAL PAIN: Status: ACTIVE | Noted: 2025-07-09

## 2025-07-09 LAB
ABSOLUTE EOSINOPHIL (OHS): 0 K/UL
ABSOLUTE MONOCYTE (OHS): 0.64 K/UL (ref 0.3–1)
ABSOLUTE NEUTROPHIL COUNT (OHS): 12.07 K/UL (ref 1.8–7.7)
ALBUMIN SERPL BCP-MCNC: 4.4 G/DL (ref 3.5–5.2)
ALLENS TEST: ABNORMAL
ALP SERPL-CCNC: 46 UNIT/L (ref 40–150)
ALT SERPL W/O P-5'-P-CCNC: 23 UNIT/L (ref 10–44)
ANION GAP (OHS): 10 MMOL/L (ref 8–16)
AST SERPL-CCNC: 39 UNIT/L (ref 11–45)
BASOPHILS # BLD AUTO: 0.02 K/UL
BASOPHILS NFR BLD AUTO: 0.1 %
BILIRUB SERPL-MCNC: 0.5 MG/DL (ref 0.1–1)
BUN SERPL-MCNC: 9 MG/DL (ref 8–23)
CALCIUM SERPL-MCNC: 9.4 MG/DL (ref 8.7–10.5)
CHLORIDE SERPL-SCNC: 112 MMOL/L (ref 95–110)
CO2 SERPL-SCNC: 20 MMOL/L (ref 23–29)
CREAT SERPL-MCNC: 0.7 MG/DL (ref 0.5–1.4)
ERYTHROCYTE [DISTWIDTH] IN BLOOD BY AUTOMATED COUNT: 13.9 % (ref 11.5–14.5)
GFR SERPLBLD CREATININE-BSD FMLA CKD-EPI: >60 ML/MIN/1.73/M2
GLUCOSE SERPL-MCNC: 169 MG/DL (ref 70–110)
HCO3 UR-SCNC: 20.4 MMOL/L (ref 24–28)
HCT VFR BLD AUTO: 40.3 % (ref 37–48.5)
HGB BLD-MCNC: 13.4 GM/DL (ref 12–16)
IMM GRANULOCYTES # BLD AUTO: 0.03 K/UL (ref 0–0.04)
IMM GRANULOCYTES NFR BLD AUTO: 0.2 % (ref 0–0.5)
LACTATE SERPL-SCNC: 1.4 MMOL/L (ref 0.5–2.2)
LACTATE SERPL-SCNC: 2.2 MMOL/L (ref 0.5–2.2)
LIPASE SERPL-CCNC: 23 U/L (ref 4–60)
LYMPHOCYTES # BLD AUTO: 0.96 K/UL (ref 1–4.8)
MAGNESIUM SERPL-MCNC: 3.4 MG/DL (ref 1.6–2.6)
MCH RBC QN AUTO: 29.1 PG (ref 27–31)
MCHC RBC AUTO-ENTMCNC: 33.3 G/DL (ref 32–36)
MCV RBC AUTO: 87 FL (ref 82–98)
NUCLEATED RBC (/100WBC) (OHS): 0 /100 WBC
OHS QRS DURATION: 96 MS
OHS QTC CALCULATION: 490 MS
PCO2 BLDA: 37 MMHG (ref 35–45)
PH SMN: 7.35 [PH] (ref 7.35–7.45)
PHOSPHATE SERPL-MCNC: 2.4 MG/DL (ref 2.7–4.5)
PLATELET # BLD AUTO: 351 K/UL (ref 150–450)
PMV BLD AUTO: 10.1 FL (ref 9.2–12.9)
PO2 BLDA: 49 MMHG (ref 40–60)
POC BE: -5 MMOL/L (ref -2–2)
POC SATURATED O2: 83 % (ref 95–100)
POC TCO2: 22 MMOL/L (ref 24–29)
POCT GLUCOSE: 131 MG/DL (ref 70–110)
POCT GLUCOSE: 85 MG/DL (ref 70–110)
POTASSIUM SERPL-SCNC: 2.9 MMOL/L (ref 3.5–5.1)
PROT SERPL-MCNC: 8 GM/DL (ref 6–8.4)
RBC # BLD AUTO: 4.61 M/UL (ref 4–5.4)
RELATIVE EOSINOPHIL (OHS): 0 %
RELATIVE LYMPHOCYTE (OHS): 7 % (ref 18–48)
RELATIVE MONOCYTE (OHS): 4.7 % (ref 4–15)
RELATIVE NEUTROPHIL (OHS): 88 % (ref 38–73)
SAMPLE: ABNORMAL
SITE: ABNORMAL
SODIUM SERPL-SCNC: 142 MMOL/L (ref 136–145)
WBC # BLD AUTO: 13.72 K/UL (ref 3.9–12.7)

## 2025-07-09 PROCEDURE — 25000242 PHARM REV CODE 250 ALT 637 W/ HCPCS

## 2025-07-09 PROCEDURE — 85025 COMPLETE CBC W/AUTO DIFF WBC: CPT

## 2025-07-09 PROCEDURE — 63600175 PHARM REV CODE 636 W HCPCS

## 2025-07-09 PROCEDURE — 84100 ASSAY OF PHOSPHORUS: CPT

## 2025-07-09 PROCEDURE — 83605 ASSAY OF LACTIC ACID: CPT

## 2025-07-09 PROCEDURE — 83735 ASSAY OF MAGNESIUM: CPT

## 2025-07-09 PROCEDURE — 20000000 HC ICU ROOM

## 2025-07-09 PROCEDURE — 94761 N-INVAS EAR/PLS OXIMETRY MLT: CPT | Mod: XB

## 2025-07-09 PROCEDURE — 25000003 PHARM REV CODE 250

## 2025-07-09 PROCEDURE — 99291 CRITICAL CARE FIRST HOUR: CPT | Mod: ,,, | Performed by: INTERNAL MEDICINE

## 2025-07-09 PROCEDURE — 83690 ASSAY OF LIPASE: CPT

## 2025-07-09 PROCEDURE — 93010 ELECTROCARDIOGRAM REPORT: CPT | Mod: ,,, | Performed by: INTERNAL MEDICINE

## 2025-07-09 PROCEDURE — 82803 BLOOD GASES ANY COMBINATION: CPT

## 2025-07-09 PROCEDURE — 25000003 PHARM REV CODE 250: Performed by: STUDENT IN AN ORGANIZED HEALTH CARE EDUCATION/TRAINING PROGRAM

## 2025-07-09 PROCEDURE — 25000003 PHARM REV CODE 250: Performed by: INTERNAL MEDICINE

## 2025-07-09 PROCEDURE — 99900035 HC TECH TIME PER 15 MIN (STAT)

## 2025-07-09 PROCEDURE — 25500020 PHARM REV CODE 255: Performed by: INTERNAL MEDICINE

## 2025-07-09 PROCEDURE — 99223 1ST HOSP IP/OBS HIGH 75: CPT | Mod: ,,, | Performed by: STUDENT IN AN ORGANIZED HEALTH CARE EDUCATION/TRAINING PROGRAM

## 2025-07-09 PROCEDURE — 80053 COMPREHEN METABOLIC PANEL: CPT

## 2025-07-09 PROCEDURE — 93005 ELECTROCARDIOGRAM TRACING: CPT | Performed by: INTERNAL MEDICINE

## 2025-07-09 RX ORDER — HYDROMORPHONE HYDROCHLORIDE 1 MG/ML
1 INJECTION, SOLUTION INTRAMUSCULAR; INTRAVENOUS; SUBCUTANEOUS
Status: DISCONTINUED | OUTPATIENT
Start: 2025-07-09 | End: 2025-07-14

## 2025-07-09 RX ORDER — LORAZEPAM 2 MG/ML
0.5 INJECTION INTRAMUSCULAR
Status: COMPLETED | OUTPATIENT
Start: 2025-07-09 | End: 2025-07-09

## 2025-07-09 RX ORDER — LORAZEPAM 2 MG/ML
0.5 INJECTION INTRAMUSCULAR ONCE
Status: COMPLETED | OUTPATIENT
Start: 2025-07-09 | End: 2025-07-09

## 2025-07-09 RX ORDER — HYDROMORPHONE HYDROCHLORIDE 1 MG/ML
1 INJECTION, SOLUTION INTRAMUSCULAR; INTRAVENOUS; SUBCUTANEOUS ONCE
Status: COMPLETED | OUTPATIENT
Start: 2025-07-09 | End: 2025-07-09

## 2025-07-09 RX ORDER — MUPIROCIN 20 MG/G
OINTMENT TOPICAL 2 TIMES DAILY
Status: DISPENSED | OUTPATIENT
Start: 2025-07-09 | End: 2025-07-14

## 2025-07-09 RX ORDER — METOCLOPRAMIDE HYDROCHLORIDE 5 MG/ML
10 INJECTION INTRAMUSCULAR; INTRAVENOUS EVERY 6 HOURS PRN
Status: DISCONTINUED | OUTPATIENT
Start: 2025-07-09 | End: 2025-07-16 | Stop reason: HOSPADM

## 2025-07-09 RX ORDER — POTASSIUM CHLORIDE 7.45 MG/ML
10 INJECTION INTRAVENOUS
Status: DISPENSED | OUTPATIENT
Start: 2025-07-09 | End: 2025-07-09

## 2025-07-09 RX ADMIN — HYDROMORPHONE HYDROCHLORIDE 1 MG: 1 INJECTION, SOLUTION INTRAMUSCULAR; INTRAVENOUS; SUBCUTANEOUS at 03:07

## 2025-07-09 RX ADMIN — ASPIRIN 81 MG CHEWABLE TABLET 81 MG: 81 TABLET CHEWABLE at 07:07

## 2025-07-09 RX ADMIN — SODIUM PHOSPHATE, MONOBASIC, MONOHYDRATE AND SODIUM PHOSPHATE, DIBASIC, ANHYDROUS 15 MMOL: 142; 276 INJECTION, SOLUTION INTRAVENOUS at 07:07

## 2025-07-09 RX ADMIN — MUPIROCIN: 20 OINTMENT TOPICAL at 08:07

## 2025-07-09 RX ADMIN — POTASSIUM CHLORIDE 10 MEQ: 7.46 INJECTION, SOLUTION INTRAVENOUS at 06:07

## 2025-07-09 RX ADMIN — ENOXAPARIN SODIUM 40 MG: 40 INJECTION SUBCUTANEOUS at 04:07

## 2025-07-09 RX ADMIN — POTASSIUM CHLORIDE 10 MEQ: 7.46 INJECTION, SOLUTION INTRAVENOUS at 03:07

## 2025-07-09 RX ADMIN — ESMOLOL HYDROCHLORIDE 175 MCG/KG/MIN: 20 INJECTION INTRAVENOUS at 12:07

## 2025-07-09 RX ADMIN — IOHEXOL 100 ML: 350 INJECTION, SOLUTION INTRAVENOUS at 11:07

## 2025-07-09 RX ADMIN — METOPROLOL TARTRATE 50 MG: 50 TABLET, FILM COATED ORAL at 08:07

## 2025-07-09 RX ADMIN — AMLODIPINE BESYLATE 10 MG: 10 TABLET ORAL at 07:07

## 2025-07-09 RX ADMIN — HYDROMORPHONE HYDROCHLORIDE 1 MG: 1 INJECTION, SOLUTION INTRAMUSCULAR; INTRAVENOUS; SUBCUTANEOUS at 07:07

## 2025-07-09 RX ADMIN — ESMOLOL HYDROCHLORIDE 200 MCG/KG/MIN: 20 INJECTION INTRAVENOUS at 04:07

## 2025-07-09 RX ADMIN — FENOFIBRATE 160 MG: 160 TABLET ORAL at 07:07

## 2025-07-09 RX ADMIN — ESMOLOL HYDROCHLORIDE 175 MCG/KG/MIN: 20 INJECTION INTRAVENOUS at 11:07

## 2025-07-09 RX ADMIN — METOCLOPRAMIDE 10 MG: 5 INJECTION, SOLUTION INTRAMUSCULAR; INTRAVENOUS at 09:07

## 2025-07-09 RX ADMIN — METOCLOPRAMIDE 10 MG: 5 INJECTION, SOLUTION INTRAMUSCULAR; INTRAVENOUS at 05:07

## 2025-07-09 RX ADMIN — POTASSIUM CHLORIDE 10 MEQ: 7.46 INJECTION, SOLUTION INTRAVENOUS at 09:07

## 2025-07-09 RX ADMIN — HYDROMORPHONE HYDROCHLORIDE 1 MG: 1 INJECTION, SOLUTION INTRAMUSCULAR; INTRAVENOUS; SUBCUTANEOUS at 01:07

## 2025-07-09 RX ADMIN — DULOXETINE 60 MG: 60 CAPSULE, DELAYED RELEASE ORAL at 07:07

## 2025-07-09 RX ADMIN — PANTOPRAZOLE SODIUM 40 MG: 40 INJECTION, POWDER, LYOPHILIZED, FOR SOLUTION INTRAVENOUS at 07:07

## 2025-07-09 RX ADMIN — HYDROMORPHONE HYDROCHLORIDE 1 MG: 1 INJECTION, SOLUTION INTRAMUSCULAR; INTRAVENOUS; SUBCUTANEOUS at 10:07

## 2025-07-09 RX ADMIN — ESMOLOL HYDROCHLORIDE 125 MCG/KG/MIN: 20 INJECTION INTRAVENOUS at 05:07

## 2025-07-09 RX ADMIN — LORAZEPAM 0.5 MG: 2 INJECTION INTRAMUSCULAR; INTRAVENOUS at 04:07

## 2025-07-09 RX ADMIN — ESMOLOL HYDROCHLORIDE 250 MCG/KG/MIN: 20 INJECTION INTRAVENOUS at 08:07

## 2025-07-09 RX ADMIN — ESMOLOL HYDROCHLORIDE 250 MCG/KG/MIN: 20 INJECTION INTRAVENOUS at 06:07

## 2025-07-09 RX ADMIN — LORAZEPAM 0.5 MG: 2 INJECTION INTRAMUSCULAR; INTRAVENOUS at 10:07

## 2025-07-09 RX ADMIN — POTASSIUM CHLORIDE 10 MEQ: 7.46 INJECTION, SOLUTION INTRAVENOUS at 05:07

## 2025-07-09 RX ADMIN — ESMOLOL HYDROCHLORIDE 175 MCG/KG/MIN: 20 INJECTION INTRAVENOUS at 02:07

## 2025-07-09 RX ADMIN — NICARDIPINE HYDROCHLORIDE 5 MG/HR: 0.2 INJECTION, SOLUTION INTRAVENOUS at 05:07

## 2025-07-09 RX ADMIN — NICARDIPINE HYDROCHLORIDE 5 MG/HR: 0.2 INJECTION, SOLUTION INTRAVENOUS at 11:07

## 2025-07-09 RX ADMIN — POTASSIUM CHLORIDE 10 MEQ: 7.46 INJECTION, SOLUTION INTRAVENOUS at 07:07

## 2025-07-09 NOTE — HPI
60F with PMHx of hypertension, hyperlipidemia, endometriosis who presented with sudden onset epigastric pain while having lunch at [employer] 2 days ago. She was incidentally found to have short segment paravisceral aortic dissection on imaging and is currently planned to undergo surgical repair next week, as well as hypertensive emergency currently on esmolol and nicardipine. GI consulted for ongoing colicky abdominal pain. She reports her pain was associated with diaphoresis and nausea, but no vomiting.She describes the pain as 10/10 and colicky, not radiating and no known triggers but relieved with dilaudid. She denies any prior episodes similar to this. She reports constipation prior to her presentation, but denies any diarrhea. She also had NGT placed since there were concerns for possible SBO but has had 12 bowel movements last night after administration of enema which were liquid with some mucous, however, she denies any blood in stool.

## 2025-07-09 NOTE — ASSESSMENT & PLAN NOTE
60-year-old female with PMHx of HTN, HLD, and neuropathy, who presents for aortic dissection type B with hypertensive emergency. Received cardene and esmolol drip at ED.  CTA: c/w  focal dissection along the right posterior margin of the aorta that extends for a length of approximately 2cm. Vascular Surgery initially consulted and suspects her dissection is subacute or chronic; is not a surgical candidate.    Admitted to the MICU for medical management of AAA dissection.  Today had severe abdominal pain.    - Esmolol gtt; titrate to maintain HR < 80 and SBP < 140  - PO lopressor and amlodipine  - Titrate Cardene gtt if BP becomes elevated  - PRN pain medications  - PRN anti-emetics  - Bentyl IM for suspected gas pain (noted on CTA A/P)

## 2025-07-09 NOTE — ASSESSMENT & PLAN NOTE
"60F with PMHx of hypertension, hyperlipidemia, endometriosis who presented with sudden onset epigastric pain. She was incidentally found to have short segment paravisceral aortic dissection on imaging and is currently planned to undergo surgical repair next week, as well as hypertensive emergency currently on esmolol and nicardipine. CTA abd w/ "Aortic atherosclerosis with mild stenosis at the origins of the celiac and superior mesenteric arteries.  No high-grade stenosis or occlusion. No secondary signs of acute mesenteric ischemia." GI consulted for ongoing colicky abdominal pain.     Plan:  -No urgent indication for any GI intervention at this point  -Recommend scheduled bowel regimen and bentyl prn  -Recommend prioritizing ICU primary issues (aortic dissection) at this point and reassess in the future  -If she passes clamp trial, can remove NGT as tolerated since she has had several non bloody BM overnight after enema  "

## 2025-07-09 NOTE — EICU
Virtual ICU Quality Rounds    Admit Date: 7/7/2025  Hospital Day: 2    ICU Day: 1d 12h    24H Vital Sign Range:  Temp:  [98 °F (36.7 °C)-99 °F (37.2 °C)]   Pulse:  []   Resp:  [12-45]   BP: (102-188)/(54-86)   SpO2:  [90 %-100 %]     VICU Surveillance Screening

## 2025-07-09 NOTE — SUBJECTIVE & OBJECTIVE
Interval History/Significant Events: c/o abd pain. [facility] is discussing possible operative intervention next week    Review of Systems   Respiratory:  Negative for chest tightness and wheezing.    Gastrointestinal:  Positive for abdominal pain. Negative for constipation, diarrhea and rectal pain.   Genitourinary:  Negative for flank pain, pelvic pain and vaginal pain.   Musculoskeletal:  Positive for back pain.     Objective:     Vital Signs (Most Recent):  Temp: 98.8 °F (37.1 °C) (07/09/25 1500)  Pulse: 85 (07/09/25 1700)  Resp: 19 (07/09/25 1700)  BP: 132/63 (07/09/25 1700)  SpO2: 96 % (07/09/25 1700) Vital Signs (24h Range):  Temp:  [98 °F (36.7 °C)-99 °F (37.2 °C)] 98.8 °F (37.1 °C)  Pulse:  [] 85  Resp:  [12-45] 19  SpO2:  [90 %-100 %] 96 %  BP: ()/(54-86) 132/63   Weight: 77.6 kg (171 lb 1.2 oz)  Body mass index is 27.61 kg/m².      Intake/Output Summary (Last 24 hours) at 7/9/2025 1715  Last data filed at 7/9/2025 1700  Gross per 24 hour   Intake 2266.09 ml   Output --   Net 2266.09 ml          Physical Exam  Vitals and nursing note reviewed.   Constitutional:       General: She is in acute distress.      Appearance: She is well-developed. She is obese. She is not ill-appearing, toxic-appearing or diaphoretic.   HENT:      Head: Normocephalic and atraumatic.      Right Ear: External ear normal.      Left Ear: External ear normal.      Nose: Nose normal.      Mouth/Throat:      Mouth: Mucous membranes are moist.   Eyes:      Conjunctiva/sclera: Conjunctivae normal.      Pupils: Pupils are equal, round, and reactive to light.   Cardiovascular:      Rate and Rhythm: Normal rate and regular rhythm.      Pulses: Normal pulses.   Pulmonary:      Effort: Pulmonary effort is normal.   Abdominal:      General: Bowel sounds are normal. There is no distension.      Palpations: Abdomen is soft.      Tenderness: There is abdominal tenderness. There is no guarding or rebound.   Musculoskeletal:         General:  No swelling.   Skin:     General: Skin is warm and dry.   Neurological:      Mental Status: She is alert.            Vents:     Lines/Drains/Airways       Peripheral Intravenous Line  Duration             Peripheral IV Single Lumen 07/07/25 1740 18 G Anterior;Left;Proximal Forearm 1 day    Peripheral IV Single Lumen 07/07/25 2134 20 G Posterior;Right Hand 1 day                  Significant Labs:    CBC/Anemia Profile:  Recent Labs   Lab 07/07/25 1754 07/08/25  0351 07/08/25  0541 07/09/25  0240   WBC 14.18* 11.27  --  13.72*   HGB 12.9 12.5  --  13.4   HCT 37.9 37.1 35* 40.3    310  --  351   MCV 85 87  --  87   RDW 13.6 13.7  --  13.9        Chemistries:  Recent Labs   Lab 07/07/25 1754 07/08/25  0351 07/09/25  0240    140 142   K 3.8 3.3* 2.9*    111* 112*   CO2 15* 16* 20*   BUN 16 14 9   CREATININE 0.7 0.9 0.7   CALCIUM 9.6 9.7 9.4   ALBUMIN 4.5 4.4 4.4   PROT 8.3 7.6 8.0   BILITOT 0.6 0.5 0.5   ALKPHOS 41 40 46   ALT 13 13 23   AST 27 20 39   MG  --  1.9 3.4*   PHOS  --  2.6* 2.4*       All pertinent labs within the past 24 hours have been reviewed.    Significant Imaging:  I have reviewed all pertinent imaging results/findings within the past 24 hours.

## 2025-07-09 NOTE — PROGRESS NOTES
[facility] - Medical ICU  Critical Care Medicine  Progress Note    Patient Name: Shelbie Santamaria  MRN: [id number]  Admission Date: 7/7/2025  Hospital Length of Stay: 2 days  Code Status: Full Code  Attending Provider: Chacorta Whittaker*  Primary Care Provider: Sunny Alexandre NP   Principal Problem: Aortic dissection    Subjective:     HPI:  Ms. Santamaria is a 60yoF with PMHx significant for HTN, HLD, and endometriosis who presented to the ED with c/o general malaise, anorexia, and chills x10 days. Per report, went to lunch today and after trying to eat, had an exacerbation of these ongoing symptoms prompting her presentation to an OSH. Imaging was obtained that demonstrated an aortic dissection in her paravisceral segment; chronicity unknown. She was transferred to [facility] for further evaluation and Vascular Surgery consult. States long-standing history of foregut and GI illnesses. Chief complaint upon exam in our ED was nausea and epigastric pain. Takes aspirin and Zetia at home and is also a former smoker. Denies back pain, chest pain, shortness of breath, or other complaints. Vascular consulted and deemed not surgical candidate; thought that AAA is either subacute or chronic. Pt. Was placed on esmolol gtt and cardene gtt but has since been weaned off cardene. Hemodynamic goals of SBP <140 and HR <80 per Vascular recs.    While in the ED, labs and imaging significant for WBC 14.18, H/H 12.9/37.9, plts 327, PTT 26.2, Pt 11.9, INR 1.1, Na 137, K 3.8, Cl 107, CO2 15, glucose 124, BUN 16, creatinine 0.7, ALP 41, AST/ALT 27/13, anion gap 15.    [facility] consulted for medical management of type B aortic dissection. Will admit to the MICU for further management.      Hospital/ICU Course:  07/09/25: repeat CTA of abd/pelvis performed due to abdominal pain. No change in imaging.    Interval History/Significant Events: c/o abd pain. [facility] is discussing possible operative intervention next week    Review of  Systems   Respiratory:  Negative for chest tightness and wheezing.    Gastrointestinal:  Positive for abdominal pain. Negative for constipation, diarrhea and rectal pain.   Genitourinary:  Negative for flank pain, pelvic pain and vaginal pain.   Musculoskeletal:  Positive for back pain.     Objective:     Vital Signs (Most Recent):  Temp: 98.8 °F (37.1 °C) (07/09/25 1500)  Pulse: 85 (07/09/25 1700)  Resp: 19 (07/09/25 1700)  BP: 132/63 (07/09/25 1700)  SpO2: 96 % (07/09/25 1700) Vital Signs (24h Range):  Temp:  [98 °F (36.7 °C)-99 °F (37.2 °C)] 98.8 °F (37.1 °C)  Pulse:  [] 85  Resp:  [12-45] 19  SpO2:  [90 %-100 %] 96 %  BP: ()/(54-86) 132/63   Weight: 77.6 kg (171 lb 1.2 oz)  Body mass index is 27.61 kg/m².      Intake/Output Summary (Last 24 hours) at 7/9/2025 1715  Last data filed at 7/9/2025 1700  Gross per 24 hour   Intake 2266.09 ml   Output --   Net 2266.09 ml          Physical Exam  Vitals and nursing note reviewed.   Constitutional:       General: She is in acute distress.      Appearance: She is well-developed. She is obese. She is not ill-appearing, toxic-appearing or diaphoretic.   HENT:      Head: Normocephalic and atraumatic.      Right Ear: External ear normal.      Left Ear: External ear normal.      Nose: Nose normal.      Mouth/Throat:      Mouth: Mucous membranes are moist.   Eyes:      Conjunctiva/sclera: Conjunctivae normal.      Pupils: Pupils are equal, round, and reactive to light.   Cardiovascular:      Rate and Rhythm: Normal rate and regular rhythm.      Pulses: Normal pulses.   Pulmonary:      Effort: Pulmonary effort is normal.   Abdominal:      General: Bowel sounds are normal. There is no distension.      Palpations: Abdomen is soft.      Tenderness: There is abdominal tenderness. There is no guarding or rebound.   Musculoskeletal:         General: No swelling.   Skin:     General: Skin is warm and dry.   Neurological:      Mental Status: She is alert.            Vents:      Lines/Drains/Airways       Peripheral Intravenous Line  Duration             Peripheral IV Single Lumen 07/07/25 1740 18 G Anterior;Left;Proximal Forearm 1 day    Peripheral IV Single Lumen 07/07/25 2134 20 G Posterior;Right Hand 1 day                  Significant Labs:    CBC/Anemia Profile:  Recent Labs   Lab 07/07/25  1754 07/08/25  0351 07/08/25  0541 07/09/25  0240   WBC 14.18* 11.27  --  13.72*   HGB 12.9 12.5  --  13.4   HCT 37.9 37.1 35* 40.3    310  --  351   MCV 85 87  --  87   RDW 13.6 13.7  --  13.9        Chemistries:  Recent Labs   Lab 07/07/25 1754 07/08/25  0351 07/09/25  0240    140 142   K 3.8 3.3* 2.9*    111* 112*   CO2 15* 16* 20*   BUN 16 14 9   CREATININE 0.7 0.9 0.7   CALCIUM 9.6 9.7 9.4   ALBUMIN 4.5 4.4 4.4   PROT 8.3 7.6 8.0   BILITOT 0.6 0.5 0.5   ALKPHOS 41 40 46   ALT 13 13 23   AST 27 20 39   MG  --  1.9 3.4*   PHOS  --  2.6* 2.4*       All pertinent labs within the past 24 hours have been reviewed.    Significant Imaging:  I have reviewed all pertinent imaging results/findings within the past 24 hours.    ABG  Recent Labs   Lab 07/09/25  0056   PH 7.349*   PO2 49   PCO2 37.0   HCO3 20.4*   BE -5*     Assessment/Plan:     Cardiac/Vascular  * Aortic dissection  60-year-old female with PMHx of HTN, HLD, and neuropathy, who presents for aortic dissection type B with hypertensive emergency. Received cardene and esmolol drip at ED.  CTA: c/w  focal dissection along the right posterior margin of the aorta that extends for a length of approximately 2cm. Vascular Surgery initially consulted and suspects her dissection is subacute or chronic; is not a surgical candidate.    Admitted to the MICU for medical management of AAA dissection.  Today had severe abdominal pain.    - Esmolol gtt; titrate to maintain HR < 80 and SBP < 140  - PO lopressor and amlodipine  - Titrate Cardene gtt if BP becomes elevated  - PRN pain medications  - PRN anti-emetics  - Bentyl IM for suspected  gas pain (noted on CTA A/P)    HTN (hypertension)  Takes amlodipine and metoprolol at baseline.    - Continue home medications  - Telemetry    HLD (hyperlipidemia)  Takes fenofibrate and ezetimibe at baseline.    - Continue home medications    Renal/  Hypokalemia  On admit, K 3.3. Likely s/t n/v.    - Replace PRN to maintain >4    GI  Abdominal pain  Multifactorial  CTA repeated today to determine possibility of bowel ischemia    Nausea & vomiting  Intermittent nausea, vomiting, and abd pain throughout stay. CTA A/P with gaseous distention. KUB with moderate stool burden.    -  on most recent EKG  - Antiemetics PRN; Zofran, phenergan, and droperidol given  - Bentyl IM x1 for suspected gas pain  - NGT placed for decompression in the setting of nausea with vomiting and inability to tolerate PO  - PRN EKG for QTC monitoring  - Strict I/Os  - Serial abd exams  - Bowel reg    Duodenitis  60-year-old female that reports nausea and epigastric pain x ~10 days. CT c/w short segment paravisceral aortic dissection w/o malperfusion with concomitant massive gastric distention, antral and duodenal inflammation in patient with long standing GI/enteral complaints and new foregut symptoms.     - Attempted GI cocktail but unfortunately vomited shortly afterward  - Dilaudid PRN for pain  - PPI daily    Critical Care Time: 35 minutes  Critical secondary to Patient has a condition that poses threat to life and bodily function: aortic dissection      Critical care was time spent personally by me on the following activities: development of treatment plan with patient or surrogate and bedside caregivers, discussions with consultants, evaluation of patient's response to treatment, examination of patient, ordering and performing treatments and interventions, ordering and review of laboratory studies, ordering and review of radiographic studies, pulse oximetry, re-evaluation of patient's condition. This critical care time did not  overlap with that of any other provider or involve time for any procedures.     Chacorta Whittaker MD  Critical Care Medicine  [facility] - Medical ICU

## 2025-07-09 NOTE — SUBJECTIVE & OBJECTIVE
Past Medical History:   Diagnosis Date    Essential (primary) hypertension     Headache        Past Surgical History:   Procedure Laterality Date    APPENDECTOMY      BREAST LUMPECTOMY      right     SECTION      COLONOSCOPY      CYSTOSCOPY      HYSTERECTOMY      pain- MI/BSO    PELVIC LAPAROSCOPY      TONSILLECTOMY         Review of patient's allergies indicates:   Allergen Reactions    Actifed plus      As a child    Erythromycin Other (See Comments)     unknown    Trazodone Palpitations     Family History    None       Tobacco Use    Smoking status: Former     Current packs/day: 0.00     Types: Cigarettes     Quit date: 2018     Years since quittin.4    Smokeless tobacco: Never   Substance and Sexual Activity    Alcohol use: Never    Drug use: Never    Sexual activity: Yes     Partners: Male     Birth control/protection: See Surgical Hx     Comment:      Review of Systems   Constitutional:  Positive for diaphoresis.   Cardiovascular:  Positive for chest pain.   Gastrointestinal:  Positive for abdominal pain, constipation and nausea. Negative for blood in stool, diarrhea and vomiting.     Objective:     Vital Signs (Most Recent):  Temp: 99 °F (37.2 °C) (25 0701)  Pulse: 89 (25 1421)  Resp: 18 (25 1400)  BP: 112/71 (25 1421)  SpO2: 95 % (25 1400) Vital Signs (24h Range):  Temp:  [98 °F (36.7 °C)-99 °F (37.2 °C)] 99 °F (37.2 °C)  Pulse:  [] 89  Resp:  [12-45] 18  SpO2:  [90 %-100 %] 95 %  BP: ()/(54-86) 112/71     Weight: 77.6 kg (171 lb 1.2 oz) (25 0637)  Body mass index is 27.61 kg/m².      Intake/Output Summary (Last 24 hours) at 2025 1434  Last data filed at 2025 1400  Gross per 24 hour   Intake 2139.51 ml   Output --   Net 2139.51 ml       Lines/Drains/Airways       Peripheral Intravenous Line  Duration             Peripheral IV Single Lumen 25 1740 18 G Anterior;Left;Proximal Forearm 1 day    Peripheral IV Single Lumen  07/07/25 2134 20 G Posterior;Right Hand 1 day                     Physical Exam  Constitutional:       General: She is not in acute distress.     Appearance: She is not diaphoretic.      Comments: NGT in place   Cardiovascular:      Rate and Rhythm: Normal rate.   Pulmonary:      Effort: Pulmonary effort is normal. No respiratory distress.   Abdominal:      General: Abdomen is flat. There is no distension.      Palpations: Abdomen is soft. There is no mass.      Tenderness: There is no abdominal tenderness. There is no guarding or rebound.   Skin:     General: Skin is warm.   Neurological:      Mental Status: She is alert and oriented to person, place, and time.          Significant Labs:    CBC:   Recent Labs   Lab 07/07/25  1754 07/08/25  0351 07/08/25  0541 07/09/25  0240   WBC 14.18* 11.27  --  13.72*   HGB 12.9 12.5  --  13.4   HCT 37.9 37.1 35* 40.3    310  --  351     CMP:   Recent Labs   Lab 07/09/25  0240   *   CALCIUM 9.4   ALBUMIN 4.4   PROT 8.0      K 2.9*   CO2 20*   *   BUN 9   CREATININE 0.7   ALKPHOS 46   ALT 23   AST 39   BILITOT 0.5     Lipase:   Recent Labs   Lab 07/09/25  1157   LIPASE 23     Significant Imaging:

## 2025-07-09 NOTE — EICU
CRISTAL Night Rounds Checklist  24H Vital Sign Range:  Temp:  [98 °F (36.7 °C)-98.7 °F (37.1 °C)]   Pulse:  [69-98]   Resp:  [12-31]   BP: ()/(48-86)   SpO2:  [96 %-100 %]     Video rounds and LDA reconciliation

## 2025-07-09 NOTE — SUBJECTIVE & OBJECTIVE
Medications:  Continuous Infusions:   esmolol  0-300 mcg/kg/min Intravenous Continuous 29.8 mL/hr at 07/09/25 0900 125 mcg/kg/min at 07/09/25 0900    nicardipine  0-15 mg/hr Intravenous Continuous 12.5 mL/hr at 07/09/25 0900 2.5 mg/hr at 07/09/25 0900     Scheduled Meds:   amLODIPine  10 mg Oral Daily    aspirin  81 mg Oral Daily    DULoxetine  60 mg Oral Daily    enoxparin  40 mg Subcutaneous Q24H (prophylaxis, 1700)    ezetimibe  10 mg Oral Nightly    fenofibrate  160 mg Oral Daily    metoprolol tartrate  50 mg Oral BID    mupirocin   Nasal BID    omega-3 acid ethyl esters  1 g Oral Daily    pantoprazole  40 mg Intravenous Daily    sodium phosphate 15 mmol in D5W 250 mL IVPB  15 mmol Intravenous Once     PRN Meds:  Current Facility-Administered Medications:     dextrose 50%, 12.5 g, Intravenous, PRN    glucagon (human recombinant), 1 mg, Intramuscular, PRN    HYDROmorphone, 1 mg, Intravenous, Q3H PRN    insulin aspart U-100, 0-5 Units, Subcutaneous, Q6H PRN    metoclopramide, 10 mg, Intravenous, Q6H PRN    ondansetron, 4 mg, Intravenous, Q6H PRN    polyethylene glycol, 17 g, Oral, Daily PRN    senna-docusate, 1 tablet, Oral, Daily PRN    sodium chloride 0.9%, 10 mL, Intravenous, PRN     Objective:     Vital Signs (Most Recent):  Temp: 99 °F (37.2 °C) (07/09/25 0701)  Pulse: 80 (07/09/25 0900)  Resp: 15 (07/09/25 0900)  BP: (!) 112/56 (07/09/25 0900)  SpO2: 95 % (07/09/25 0900) Vital Signs (24h Range):  Temp:  [98 °F (36.7 °C)-99 °F (37.2 °C)] 99 °F (37.2 °C)  Pulse:  [] 80  Resp:  [12-33] 15  SpO2:  [90 %-100 %] 95 %  BP: (102-188)/(55-86) 112/56     Date 07/09/25 0700 - 07/10/25 0659   Shift 5776-0963 9955-5416 1148-5744 24 Hour Total   INTAKE   I.V.(mL/kg) 201.6(2.6)   201.6(2.6)   IV Piggyback 400.9   400.9   Shift Total(mL/kg) 602.6(7.8)   602.6(7.8)   OUTPUT   Shift Total(mL/kg)       Weight (kg) 77.6 77.6 77.6 77.6        Physical Exam  Constitutional:       General: She is awake.      Appearance:  She is overweight.      Comments: NGT    HENT:      Head: Normocephalic.      Mouth/Throat:      Mouth: Mucous membranes are dry.   Cardiovascular:      Rate and Rhythm: Normal rate.      Pulses: Normal pulses.   Pulmonary:      Effort: Pulmonary effort is normal.   Abdominal:      General: There is distension.      Tenderness: There is abdominal tenderness.   Musculoskeletal:         General: Normal range of motion.   Skin:     General: Skin is warm.   Neurological:      General: No focal deficit present.      Mental Status: She is alert.   Psychiatric:         Behavior: Behavior is cooperative.          Significant Labs:  CBC:   Recent Labs   Lab 07/09/25  0240   WBC 13.72*   RBC 4.61   HGB 13.4   HCT 40.3      MCV 87   MCH 29.1   MCHC 33.3     CMP:   Recent Labs   Lab 07/09/25  0240   *   CALCIUM 9.4   ALBUMIN 4.4   PROT 8.0      K 2.9*   CO2 20*   *   BUN 9   CREATININE 0.7   ALKPHOS 46   ALT 23   AST 39   BILITOT 0.5     All pertinent labs from the last 24 hours have been reviewed.    Significant Diagnostics:  I have reviewed all pertinent imaging results/findings within the past 24 hours.

## 2025-07-09 NOTE — HOSPITAL COURSE
07/09/25: repeat CTA of abd/pelvis performed due to abdominal pain. No change in imaging.  07/10/25: Noted to have urinary retention. In and out with 800ml output. Abd pain better. Wants to increase activity. Vascular requesting blood cultures  07/11/25: Decreasing amounts of esmolol.  07/12/25: Continue to titrate esmolol. NGT removed. Advance to clears  07/13/25: Better BP/HR control. Pain is improved.    7/14:

## 2025-07-09 NOTE — CONSULTS
[facility] - Medical ICU  Gastroenterology  Consult Note    Patient Name: Shelbie Santamaria  MRN: [id number]  Admission Date: 2025  Hospital Length of Stay: 2 days  Code Status: Full Code   Attending Provider: Chacorta Whittaker*   Consulting Provider: Leona Collier MD  Primary Care Physician: Sunny Alexandre NP  Principal Problem:Aortic dissection    Inpatient consult to Gastroenterology  Consult performed by: Leona Collier MD  Consult ordered by: Jannie Balbuena NP        Subjective:     HPI:  60F with PMHx of hypertension, hyperlipidemia, endometriosis who presented with sudden onset epigastric pain while having lunch at [employer] 2 days ago. She was incidentally found to have short segment paravisceral aortic dissection on imaging and is currently planned to undergo surgical repair next week, as well as hypertensive emergency currently on esmolol and nicardipine. GI consulted for ongoing colicky abdominal pain. She reports her pain was associated with diaphoresis and nausea, but no vomiting.She describes the pain as 10/10 and colicky, not radiating and no known triggers but relieved with dilaudid. She denies any prior episodes similar to this. She reports constipation prior to her presentation, but denies any diarrhea. She also had NGT placed since there were concerns for possible SBO but has had 12 bowel movements last night after administration of enema which were liquid with some mucous, however, she denies any blood in stool.     Past Medical History:   Diagnosis Date    Essential (primary) hypertension     Headache        Past Surgical History:   Procedure Laterality Date    APPENDECTOMY      BREAST LUMPECTOMY      right     SECTION      COLONOSCOPY      CYSTOSCOPY      HYSTERECTOMY      pain- MI/BSO    PELVIC LAPAROSCOPY      TONSILLECTOMY         Review of patient's allergies indicates:   Allergen Reactions    Actifed plus      As a child     Erythromycin Other (See Comments)     unknown    Trazodone Palpitations     Family History    None       Tobacco Use    Smoking status: Former     Current packs/day: 0.00     Types: Cigarettes     Quit date: 2018     Years since quittin.4    Smokeless tobacco: Never   Substance and Sexual Activity    Alcohol use: Never    Drug use: Never    Sexual activity: Yes     Partners: Male     Birth control/protection: See Surgical Hx     Comment:      Review of Systems   Constitutional:  Positive for diaphoresis.   Cardiovascular:  Positive for chest pain.   Gastrointestinal:  Positive for abdominal pain, constipation and nausea. Negative for blood in stool, diarrhea and vomiting.     Objective:     Vital Signs (Most Recent):  Temp: 99 °F (37.2 °C) (25 0701)  Pulse: 89 (25 1421)  Resp: 18 (25 1400)  BP: 112/71 (25 1421)  SpO2: 95 % (25 1400) Vital Signs (24h Range):  Temp:  [98 °F (36.7 °C)-99 °F (37.2 °C)] 99 °F (37.2 °C)  Pulse:  [] 89  Resp:  [12-45] 18  SpO2:  [90 %-100 %] 95 %  BP: ()/(54-86) 112/71     Weight: 77.6 kg (171 lb 1.2 oz) (25 0637)  Body mass index is 27.61 kg/m².      Intake/Output Summary (Last 24 hours) at 2025 1434  Last data filed at 2025 1400  Gross per 24 hour   Intake 2139.51 ml   Output --   Net 2139.51 ml       Lines/Drains/Airways       Peripheral Intravenous Line  Duration             Peripheral IV Single Lumen 25 1740 18 G Anterior;Left;Proximal Forearm 1 day    Peripheral IV Single Lumen 25 2134 20 G Posterior;Right Hand 1 day                     Physical Exam  Constitutional:       General: She is not in acute distress.     Appearance: She is not diaphoretic.      Comments: NGT in place   Cardiovascular:      Rate and Rhythm: Normal rate.   Pulmonary:      Effort: Pulmonary effort is normal. No respiratory distress.   Abdominal:      General: Abdomen is flat. There is no distension.      Palpations: Abdomen  "is soft. There is no mass.      Tenderness: There is no abdominal tenderness. There is no guarding or rebound.   Skin:     General: Skin is warm.   Neurological:      Mental Status: She is alert and oriented to person, place, and time.          Significant Labs:    CBC:   Recent Labs   Lab 07/07/25  1754 07/08/25  0351 07/08/25  0541 07/09/25  0240   WBC 14.18* 11.27  --  13.72*   HGB 12.9 12.5  --  13.4   HCT 37.9 37.1 35* 40.3    310  --  351     CMP:   Recent Labs   Lab 07/09/25  0240   *   CALCIUM 9.4   ALBUMIN 4.4   PROT 8.0      K 2.9*   CO2 20*   *   BUN 9   CREATININE 0.7   ALKPHOS 46   ALT 23   AST 39   BILITOT 0.5     Lipase:   Recent Labs   Lab 07/09/25  1157   LIPASE 23     Significant Imaging:  Assessment/Plan:     GI  Abdominal pain  60F with PMHx of hypertension, hyperlipidemia, endometriosis who presented with sudden onset epigastric pain. She was incidentally found to have short segment paravisceral aortic dissection on imaging and is currently planned to undergo surgical repair next week, as well as hypertensive emergency currently on esmolol and nicardipine. CTA abd w/ "Aortic atherosclerosis with mild stenosis at the origins of the celiac and superior mesenteric arteries.  No high-grade stenosis or occlusion. No secondary signs of acute mesenteric ischemia." GI consulted for ongoing colicky abdominal pain.     Plan:  -No urgent indication for any GI intervention at this point  -Recommend scheduled bowel regimen and bentyl prn  -Recommend prioritizing ICU primary issues (aortic dissection) at this point and reassess in the future  -If she passes clamp trial, can remove NGT as tolerated since she has had several non bloody BM overnight after enema        Thank you for your consult. I will sign off. Please contact us if you have any additional questions.    Leona Collier MD  Gastroenterology  [facility] - Medical ICU  "

## 2025-07-09 NOTE — ASSESSMENT & PLAN NOTE
60F with likely incidental discovery of short segment paravisceral aortic dissection w/o malperfusion with concomitant massive gastric distention, antral and duodenal inflammation in patient with long standing GI/enteral complaints and new foregut symptoms.     - Suspect her severe gastric dissection is cause of pain, not her dissection, which may be chronic.  - NG tube in place to decompress  - Recommend ASA with BP goal <140, HR <80.   - May resume oral anti hypertensive  - gentle IVFs  - DVT Ppx  - PPI  - Recommend consulting GI for abdominal pain     Plan: repeat CT scan tomorrow and possible surgery on Monday

## 2025-07-10 PROBLEM — R33.9 URINARY RETENTION: Status: ACTIVE | Noted: 2025-07-10

## 2025-07-10 LAB
ABSOLUTE EOSINOPHIL (OHS): 0 K/UL
ABSOLUTE MONOCYTE (OHS): 1.33 K/UL (ref 0.3–1)
ABSOLUTE NEUTROPHIL COUNT (OHS): 10.63 K/UL (ref 1.8–7.7)
ALBUMIN SERPL BCP-MCNC: 4 G/DL (ref 3.5–5.2)
ALP SERPL-CCNC: 43 UNIT/L (ref 40–150)
ALT SERPL W/O P-5'-P-CCNC: 26 UNIT/L (ref 10–44)
ANION GAP (OHS): 11 MMOL/L (ref 8–16)
AST SERPL-CCNC: 34 UNIT/L (ref 11–45)
BACTERIA #/AREA URNS AUTO: NORMAL /HPF
BASOPHILS # BLD AUTO: 0.02 K/UL
BASOPHILS NFR BLD AUTO: 0.1 %
BILIRUB SERPL-MCNC: 0.7 MG/DL (ref 0.1–1)
BILIRUB UR QL STRIP.AUTO: NEGATIVE
BUN SERPL-MCNC: 9 MG/DL (ref 8–23)
CALCIUM SERPL-MCNC: 9.4 MG/DL (ref 8.7–10.5)
CHLORIDE SERPL-SCNC: 111 MMOL/L (ref 95–110)
CLARITY UR: CLEAR
CO2 SERPL-SCNC: 20 MMOL/L (ref 23–29)
COLOR UR AUTO: YELLOW
CREAT SERPL-MCNC: 0.8 MG/DL (ref 0.5–1.4)
ERYTHROCYTE [DISTWIDTH] IN BLOOD BY AUTOMATED COUNT: 14.2 % (ref 11.5–14.5)
GFR SERPLBLD CREATININE-BSD FMLA CKD-EPI: >60 ML/MIN/1.73/M2
GLUCOSE SERPL-MCNC: 103 MG/DL (ref 70–110)
GLUCOSE UR QL STRIP: NEGATIVE
HCT VFR BLD AUTO: 39.4 % (ref 37–48.5)
HGB BLD-MCNC: 12.9 GM/DL (ref 12–16)
HGB UR QL STRIP: NEGATIVE
HYALINE CASTS UR QL AUTO: 0 /LPF (ref 0–1)
IMM GRANULOCYTES # BLD AUTO: 0.04 K/UL (ref 0–0.04)
IMM GRANULOCYTES NFR BLD AUTO: 0.3 % (ref 0–0.5)
KETONES UR QL STRIP: NEGATIVE
LEUKOCYTE ESTERASE UR QL STRIP: NEGATIVE
LYMPHOCYTES # BLD AUTO: 2.35 K/UL (ref 1–4.8)
MAGNESIUM SERPL-MCNC: 2.3 MG/DL (ref 1.6–2.6)
MCH RBC QN AUTO: 29 PG (ref 27–31)
MCHC RBC AUTO-ENTMCNC: 32.7 G/DL (ref 32–36)
MCV RBC AUTO: 89 FL (ref 82–98)
MICROSCOPIC COMMENT: NORMAL
NITRITE UR QL STRIP: NEGATIVE
NUCLEATED RBC (/100WBC) (OHS): 0 /100 WBC
PH UR STRIP: 6 [PH]
PHOSPHATE SERPL-MCNC: 3.3 MG/DL (ref 2.7–4.5)
PLATELET # BLD AUTO: 332 K/UL (ref 150–450)
PMV BLD AUTO: 10.3 FL (ref 9.2–12.9)
POTASSIUM SERPL-SCNC: 3.3 MMOL/L (ref 3.5–5.1)
PROT SERPL-MCNC: 7.5 GM/DL (ref 6–8.4)
PROT UR QL STRIP: ABNORMAL
RBC # BLD AUTO: 4.45 M/UL (ref 4–5.4)
RBC #/AREA URNS AUTO: <1 /HPF (ref 0–4)
RELATIVE EOSINOPHIL (OHS): 0 %
RELATIVE LYMPHOCYTE (OHS): 16.4 % (ref 18–48)
RELATIVE MONOCYTE (OHS): 9.3 % (ref 4–15)
RELATIVE NEUTROPHIL (OHS): 73.9 % (ref 38–73)
SODIUM SERPL-SCNC: 142 MMOL/L (ref 136–145)
SP GR UR STRIP: 1.02
SQUAMOUS #/AREA URNS AUTO: 1 /HPF
UROBILINOGEN UR STRIP-ACNC: NEGATIVE EU/DL
WBC # BLD AUTO: 14.37 K/UL (ref 3.9–12.7)
WBC #/AREA URNS AUTO: 2 /HPF (ref 0–5)
YEAST UR QL AUTO: NORMAL /HPF

## 2025-07-10 PROCEDURE — 87040 BLOOD CULTURE FOR BACTERIA: CPT

## 2025-07-10 PROCEDURE — 63600175 PHARM REV CODE 636 W HCPCS

## 2025-07-10 PROCEDURE — 94761 N-INVAS EAR/PLS OXIMETRY MLT: CPT

## 2025-07-10 PROCEDURE — 51701 INSERT BLADDER CATHETER: CPT

## 2025-07-10 PROCEDURE — 63600175 PHARM REV CODE 636 W HCPCS: Performed by: GENERAL ACUTE CARE HOSPITAL

## 2025-07-10 PROCEDURE — 81001 URINALYSIS AUTO W/SCOPE: CPT

## 2025-07-10 PROCEDURE — 25000003 PHARM REV CODE 250

## 2025-07-10 PROCEDURE — 25000003 PHARM REV CODE 250: Performed by: STUDENT IN AN ORGANIZED HEALTH CARE EDUCATION/TRAINING PROGRAM

## 2025-07-10 PROCEDURE — 83735 ASSAY OF MAGNESIUM: CPT

## 2025-07-10 PROCEDURE — 25000003 PHARM REV CODE 250: Performed by: GENERAL ACUTE CARE HOSPITAL

## 2025-07-10 PROCEDURE — 20000000 HC ICU ROOM

## 2025-07-10 PROCEDURE — 84100 ASSAY OF PHOSPHORUS: CPT

## 2025-07-10 PROCEDURE — 25000242 PHARM REV CODE 250 ALT 637 W/ HCPCS

## 2025-07-10 PROCEDURE — 85025 COMPLETE CBC W/AUTO DIFF WBC: CPT

## 2025-07-10 PROCEDURE — 99232 SBSQ HOSP IP/OBS MODERATE 35: CPT | Mod: ,,,

## 2025-07-10 PROCEDURE — 80053 COMPREHEN METABOLIC PANEL: CPT

## 2025-07-10 PROCEDURE — 25000003 PHARM REV CODE 250: Performed by: INTERNAL MEDICINE

## 2025-07-10 PROCEDURE — 51798 US URINE CAPACITY MEASURE: CPT

## 2025-07-10 PROCEDURE — 99291 CRITICAL CARE FIRST HOUR: CPT | Mod: ,,, | Performed by: INTERNAL MEDICINE

## 2025-07-10 RX ORDER — HYDRALAZINE HYDROCHLORIDE 20 MG/ML
20 INJECTION INTRAMUSCULAR; INTRAVENOUS EVERY 4 HOURS PRN
Status: DISCONTINUED | OUTPATIENT
Start: 2025-07-10 | End: 2025-07-15

## 2025-07-10 RX ORDER — DICYCLOMINE HYDROCHLORIDE 10 MG/1
10 CAPSULE ORAL 4 TIMES DAILY PRN
Status: DISCONTINUED | OUTPATIENT
Start: 2025-07-10 | End: 2025-07-16 | Stop reason: HOSPADM

## 2025-07-10 RX ORDER — MAGNESIUM SULFATE HEPTAHYDRATE 40 MG/ML
2 INJECTION, SOLUTION INTRAVENOUS
Status: DISCONTINUED | OUTPATIENT
Start: 2025-07-10 | End: 2025-07-16 | Stop reason: HOSPADM

## 2025-07-10 RX ORDER — CALCIUM GLUCONATE 20 MG/ML
3 INJECTION, SOLUTION INTRAVENOUS
Status: DISCONTINUED | OUTPATIENT
Start: 2025-07-10 | End: 2025-07-16 | Stop reason: HOSPADM

## 2025-07-10 RX ORDER — POTASSIUM CHLORIDE 7.45 MG/ML
40 INJECTION INTRAVENOUS
Status: DISCONTINUED | OUTPATIENT
Start: 2025-07-10 | End: 2025-07-16 | Stop reason: HOSPADM

## 2025-07-10 RX ORDER — CALCIUM GLUCONATE 20 MG/ML
2 INJECTION, SOLUTION INTRAVENOUS
Status: DISCONTINUED | OUTPATIENT
Start: 2025-07-10 | End: 2025-07-16 | Stop reason: HOSPADM

## 2025-07-10 RX ORDER — POTASSIUM CHLORIDE 7.45 MG/ML
80 INJECTION INTRAVENOUS
Status: DISCONTINUED | OUTPATIENT
Start: 2025-07-10 | End: 2025-07-16 | Stop reason: HOSPADM

## 2025-07-10 RX ORDER — METOPROLOL TARTRATE 50 MG/1
100 TABLET ORAL 2 TIMES DAILY
Status: DISCONTINUED | OUTPATIENT
Start: 2025-07-10 | End: 2025-07-11

## 2025-07-10 RX ORDER — CALCIUM GLUCONATE 20 MG/ML
1 INJECTION, SOLUTION INTRAVENOUS
Status: DISCONTINUED | OUTPATIENT
Start: 2025-07-10 | End: 2025-07-16 | Stop reason: HOSPADM

## 2025-07-10 RX ORDER — LABETALOL HCL 20 MG/4 ML
10 SYRINGE (ML) INTRAVENOUS EVERY 4 HOURS PRN
Status: DISCONTINUED | OUTPATIENT
Start: 2025-07-10 | End: 2025-07-13

## 2025-07-10 RX ORDER — LORAZEPAM 2 MG/ML
0.5 INJECTION INTRAMUSCULAR ONCE
Status: COMPLETED | OUTPATIENT
Start: 2025-07-10 | End: 2025-07-10

## 2025-07-10 RX ORDER — POTASSIUM CHLORIDE 7.45 MG/ML
60 INJECTION INTRAVENOUS
Status: DISCONTINUED | OUTPATIENT
Start: 2025-07-10 | End: 2025-07-16 | Stop reason: HOSPADM

## 2025-07-10 RX ADMIN — ESMOLOL HYDROCHLORIDE 300 MCG/KG/MIN: 20 INJECTION INTRAVENOUS at 04:07

## 2025-07-10 RX ADMIN — DICYCLOMINE HYDROCHLORIDE 10 MG: 10 CAPSULE ORAL at 10:07

## 2025-07-10 RX ADMIN — ESMOLOL HYDROCHLORIDE 300 MCG/KG/MIN: 20 INJECTION INTRAVENOUS at 09:07

## 2025-07-10 RX ADMIN — EZETIMIBE 10 MG: 10 TABLET ORAL at 08:07

## 2025-07-10 RX ADMIN — POTASSIUM CHLORIDE 10 MEQ: 7.46 INJECTION, SOLUTION INTRAVENOUS at 06:07

## 2025-07-10 RX ADMIN — HYDROMORPHONE HYDROCHLORIDE 1 MG: 1 INJECTION, SOLUTION INTRAMUSCULAR; INTRAVENOUS; SUBCUTANEOUS at 10:07

## 2025-07-10 RX ADMIN — METOPROLOL TARTRATE 100 MG: 50 TABLET, FILM COATED ORAL at 08:07

## 2025-07-10 RX ADMIN — ESMOLOL HYDROCHLORIDE 300 MCG/KG/MIN: 20 INJECTION INTRAVENOUS at 11:07

## 2025-07-10 RX ADMIN — MUPIROCIN: 20 OINTMENT TOPICAL at 08:07

## 2025-07-10 RX ADMIN — ESMOLOL HYDROCHLORIDE 250 MCG/KG/MIN: 20 INJECTION INTRAVENOUS at 12:07

## 2025-07-10 RX ADMIN — ESMOLOL HYDROCHLORIDE 300 MCG/KG/MIN: 20 INJECTION INTRAVENOUS at 08:07

## 2025-07-10 RX ADMIN — ESMOLOL HYDROCHLORIDE 250 MCG/KG/MIN: 20 INJECTION INTRAVENOUS at 06:07

## 2025-07-10 RX ADMIN — HYDROMORPHONE HYDROCHLORIDE 1 MG: 1 INJECTION, SOLUTION INTRAMUSCULAR; INTRAVENOUS; SUBCUTANEOUS at 02:07

## 2025-07-10 RX ADMIN — ESMOLOL HYDROCHLORIDE 250 MCG/KG/MIN: 20 INJECTION INTRAVENOUS at 05:07

## 2025-07-10 RX ADMIN — NICARDIPINE HYDROCHLORIDE 2.5 MG/HR: 0.2 INJECTION, SOLUTION INTRAVENOUS at 01:07

## 2025-07-10 RX ADMIN — POTASSIUM CHLORIDE 10 MEQ: 7.46 INJECTION, SOLUTION INTRAVENOUS at 02:07

## 2025-07-10 RX ADMIN — METOCLOPRAMIDE 10 MG: 5 INJECTION, SOLUTION INTRAMUSCULAR; INTRAVENOUS at 10:07

## 2025-07-10 RX ADMIN — HYDROMORPHONE HYDROCHLORIDE 1 MG: 1 INJECTION, SOLUTION INTRAMUSCULAR; INTRAVENOUS; SUBCUTANEOUS at 06:07

## 2025-07-10 RX ADMIN — POTASSIUM CHLORIDE 10 MEQ: 7.46 INJECTION, SOLUTION INTRAVENOUS at 04:07

## 2025-07-10 RX ADMIN — POLYETHYLENE GLYCOL 3350 17 G: 17 POWDER, FOR SOLUTION ORAL at 10:07

## 2025-07-10 RX ADMIN — METOPROLOL TARTRATE 50 MG: 50 TABLET, FILM COATED ORAL at 08:07

## 2025-07-10 RX ADMIN — PANTOPRAZOLE SODIUM 40 MG: 40 INJECTION, POWDER, LYOPHILIZED, FOR SOLUTION INTRAVENOUS at 08:07

## 2025-07-10 RX ADMIN — SENNOSIDES AND DOCUSATE SODIUM 1 TABLET: 50; 8.6 TABLET ORAL at 10:07

## 2025-07-10 RX ADMIN — ESMOLOL HYDROCHLORIDE 300 MCG/KG/MIN: 20 INJECTION INTRAVENOUS at 07:07

## 2025-07-10 RX ADMIN — LORAZEPAM 0.5 MG: 2 INJECTION INTRAMUSCULAR; INTRAVENOUS at 08:07

## 2025-07-10 RX ADMIN — ESMOLOL HYDROCHLORIDE 300 MCG/KG/MIN: 20 INJECTION INTRAVENOUS at 12:07

## 2025-07-10 RX ADMIN — DICYCLOMINE HYDROCHLORIDE 10 MG: 10 CAPSULE ORAL at 01:07

## 2025-07-10 RX ADMIN — DULOXETINE 60 MG: 60 CAPSULE, DELAYED RELEASE ORAL at 08:07

## 2025-07-10 RX ADMIN — POTASSIUM CHLORIDE 10 MEQ: 7.46 INJECTION, SOLUTION INTRAVENOUS at 10:07

## 2025-07-10 RX ADMIN — ESMOLOL HYDROCHLORIDE 250 MCG/KG/MIN: 20 INJECTION INTRAVENOUS at 01:07

## 2025-07-10 RX ADMIN — AMLODIPINE BESYLATE 10 MG: 10 TABLET ORAL at 08:07

## 2025-07-10 RX ADMIN — POTASSIUM CHLORIDE 10 MEQ: 7.46 INJECTION, SOLUTION INTRAVENOUS at 11:07

## 2025-07-10 RX ADMIN — ENOXAPARIN SODIUM 40 MG: 40 INJECTION SUBCUTANEOUS at 04:07

## 2025-07-10 RX ADMIN — ESMOLOL HYDROCHLORIDE 300 MCG/KG/MIN: 20 INJECTION INTRAVENOUS at 01:07

## 2025-07-10 RX ADMIN — HYDROMORPHONE HYDROCHLORIDE 1 MG: 1 INJECTION, SOLUTION INTRAMUSCULAR; INTRAVENOUS; SUBCUTANEOUS at 01:07

## 2025-07-10 RX ADMIN — FENOFIBRATE 160 MG: 160 TABLET ORAL at 08:07

## 2025-07-10 RX ADMIN — ESMOLOL HYDROCHLORIDE 300 MCG/KG/MIN: 20 INJECTION INTRAVENOUS at 06:07

## 2025-07-10 RX ADMIN — ASPIRIN 81 MG CHEWABLE TABLET 81 MG: 81 TABLET CHEWABLE at 08:07

## 2025-07-10 RX ADMIN — POTASSIUM CHLORIDE 10 MEQ: 7.46 INJECTION, SOLUTION INTRAVENOUS at 12:07

## 2025-07-10 NOTE — PROGRESS NOTES
[facility] - Medical ICU  Vascular Surgery  Progress Note    Patient Name: Shelbie Santamaria  MRN: [id number]  Admission Date: 7/7/2025  Primary Care Provider: Sunny Alexandre NP    Subjective:     Interval History: Pt reporting continued nausea, but states she has not vomited over the last day. NGT with minimal output overnight. She reports that her abdominal pain is currently moderately controlled with her pain medications.     Post-Op Info:  Procedure(s) (LRB):  REPAIR, ANEURYSM, AORTA, ENDOVASCULAR (N/A)         Medications:  Continuous Infusions:   esmolol  0-300 mcg/kg/min Intravenous Continuous 59.6 mL/hr at 07/10/25 0658 250 mcg/kg/min at 07/10/25 0658    nicardipine  0-15 mg/hr Intravenous Continuous 12.5 mL/hr at 07/10/25 0117 2.5 mg/hr at 07/10/25 0117     Scheduled Meds:   amLODIPine  10 mg Oral Daily    aspirin  81 mg Oral Daily    DULoxetine  60 mg Oral Daily    enoxparin  40 mg Subcutaneous Q24H (prophylaxis, 1700)    ezetimibe  10 mg Oral Nightly    fenofibrate  160 mg Oral Daily    lorazepam  0.5 mg Intravenous Once    metoprolol tartrate  50 mg Oral BID    mupirocin   Nasal BID    omega-3 acid ethyl esters  1 g Oral Daily    pantoprazole  40 mg Intravenous Daily     PRN Meds:  Current Facility-Administered Medications:     dextrose 50%, 12.5 g, Intravenous, PRN    glucagon (human recombinant), 1 mg, Intramuscular, PRN    HYDROmorphone, 1 mg, Intravenous, Q3H PRN    insulin aspart U-100, 0-5 Units, Subcutaneous, Q6H PRN    metoclopramide, 10 mg, Intravenous, Q6H PRN    ondansetron, 4 mg, Intravenous, Q6H PRN    polyethylene glycol, 17 g, Oral, Daily PRN    senna-docusate, 1 tablet, Oral, Daily PRN    sodium chloride 0.9%, 10 mL, Intravenous, PRN     Objective:     Vital Signs (Most Recent):  Temp: 98 °F (36.7 °C) (07/10/25 0305)  Pulse: 84 (07/10/25 0657)  Resp: (!) 21 (07/10/25 0602)  BP: (!) 130/59 (07/10/25 0657)  SpO2: 100 % (07/10/25 0400) Vital Signs (24h Range):  Temp:  [98 °F (36.7  °C)-98.8 °F (37.1 °C)] 98 °F (36.7 °C)  Pulse:  [72-95] 84  Resp:  [15-47] 21  SpO2:  [93 %-100 %] 100 %  BP: ()/() 130/59          Physical Exam  HENT:      Head: Normocephalic and atraumatic.      Nose:      Comments: NGT in place with minimal output  Eyes:      Extraocular Movements: Extraocular movements intact.   Pulmonary:      Effort: Pulmonary effort is normal.   Abdominal:      Comments: Mild abdominal tenderness on exam   Skin:     General: Skin is warm.          Significant Labs:  All pertinent labs from the last 24 hours have been reviewed.    Significant Diagnostics:  I have reviewed all pertinent imaging results/findings within the past 24 hours.  Assessment/Plan:     * Aortic dissection  60F with likely incidental discovery of short segment paravisceral aortic dissection w/o malperfusion with concomitant massive gastric distention, antral and duodenal inflammation in patient with long standing GI/enteral complaints and new foregut symptoms.     - Suspect her severe gastric dissection is cause of pain, not her dissection, which may be chronic.  - NG tube in place to decompress  - Recommend ASA with BP goal <140, HR <80.   - gentle IVFs  - DVT Ppx  - PPI    Plan: PMEG pending scheduling      Hypokalemia  K+ 2.9  - prn replacements       HTN (hypertension)  - amlodipine   - metoprolol      HLD (hyperlipidemia)  - statin        Isis Hernandez MD  Vascular Surgery  [facility] - Medical ICU

## 2025-07-10 NOTE — SUBJECTIVE & OBJECTIVE
Interval History/Significant Events: c/o abd pain. [facility] is discussing possible operative intervention next week    Review of Systems   Respiratory:  Negative for chest tightness and wheezing.    Gastrointestinal:  Negative for abdominal pain, constipation, diarrhea and rectal pain.   Genitourinary:  Positive for decreased urine volume. Negative for flank pain, pelvic pain and vaginal pain.   Musculoskeletal:  Negative for back pain.     Objective:     Vital Signs (Most Recent):  Temp: 98.5 °F (36.9 °C) (07/10/25 1100)  Pulse: 79 (07/10/25 1400)  Resp: 20 (07/10/25 1449)  BP: 136/75 (07/10/25 1400)  SpO2: 97 % (07/10/25 1400) Vital Signs (24h Range):  Temp:  [98 °F (36.7 °C)-98.5 °F (36.9 °C)] 98.5 °F (36.9 °C)  Pulse:  [72-92] 79  Resp:  [12-47] 20  SpO2:  [95 %-100 %] 97 %  BP: ()/() 136/75   Weight: 77.6 kg (171 lb 1.2 oz)  Body mass index is 27.61 kg/m².      Intake/Output Summary (Last 24 hours) at 7/10/2025 1623  Last data filed at 7/10/2025 1440  Gross per 24 hour   Intake 1829.84 ml   Output 625 ml   Net 1204.84 ml          Physical Exam  Vitals and nursing note reviewed.   Constitutional:       General: She is not in acute distress.     Appearance: She is well-developed. She is obese. She is not ill-appearing, toxic-appearing or diaphoretic.   HENT:      Head: Normocephalic and atraumatic.      Right Ear: External ear normal.      Left Ear: External ear normal.      Nose: Nose normal.      Mouth/Throat:      Mouth: Mucous membranes are moist.   Eyes:      Conjunctiva/sclera: Conjunctivae normal.      Pupils: Pupils are equal, round, and reactive to light.   Cardiovascular:      Rate and Rhythm: Normal rate and regular rhythm.      Pulses: Normal pulses.   Pulmonary:      Effort: Pulmonary effort is normal.   Abdominal:      General: Bowel sounds are normal. There is no distension.      Palpations: Abdomen is soft.      Tenderness: There is no abdominal tenderness. There is no guarding or rebound.    Musculoskeletal:         General: No swelling.   Skin:     General: Skin is warm and dry.   Neurological:      Mental Status: She is alert.            Vents:     Lines/Drains/Airways       Drain  Duration                  NG/OG Tube 07/08/25 0919 Right nostril 2 days              Peripheral Intravenous Line  Duration             Peripheral IV Single Lumen 07/07/25 1740 18 G Anterior;Left;Proximal Forearm 2 days    Peripheral IV Single Lumen 07/07/25 2134 20 G Posterior;Right Hand 2 days                  Significant Labs:    CBC/Anemia Profile:  Recent Labs   Lab 07/09/25 0240 07/10/25  0409   WBC 13.72* 14.37*   HGB 13.4 12.9   HCT 40.3 39.4    332   MCV 87 89   RDW 13.9 14.2        Chemistries:  Recent Labs   Lab 07/09/25  0240 07/10/25  0409    142   K 2.9* 3.3*   * 111*   CO2 20* 20*   BUN 9 9   CREATININE 0.7 0.8   CALCIUM 9.4 9.4   ALBUMIN 4.4 4.0   PROT 8.0 7.5   BILITOT 0.5 0.7   ALKPHOS 46 43   ALT 23 26   AST 39 34   MG 3.4* 2.3   PHOS 2.4* 3.3       All pertinent labs within the past 24 hours have been reviewed.    Significant Imaging:  I have reviewed all pertinent imaging results/findings within the past 24 hours.

## 2025-07-10 NOTE — EICU
Intervention Initiated From:  COR / YANNIU    Yandel intervened regarding:  Rounding (Video assessment)  VICU Night Rounds Checklist  24H Vital Sign Range:  Temp:  [98 °F (36.7 °C)-99 °F (37.2 °C)]   Pulse:  [75-96]   Resp:  [14-45]   BP: ()/(54-82)   SpO2:  [90 %-99 %]     Video rounds

## 2025-07-10 NOTE — PHYSICIAN QUERY
Please confirm/ clarify the Hypertensive Emergency diagnosis with clinical indicators ( signs & symptoms of end organ damage) to support the diagnosis.  Hypertensive emergency is confirmed and clinical indicators (signs & symptoms of end organ damage) to support the diagnosis include : ( please specify) end organ effects, namely aortic dissection.

## 2025-07-10 NOTE — ASSESSMENT & PLAN NOTE
"60F with PMHx of hypertension, hyperlipidemia, endometriosis who presented with sudden onset epigastric pain. She was incidentally found to have short segment paravisceral aortic dissection on imaging and is currently planned to undergo surgical repair next week, as well as hypertensive emergency currently on esmolol and nicardipine. CTA abd w/ "Aortic atherosclerosis with mild stenosis at the origins of the celiac and superior mesenteric arteries.  No high-grade stenosis or occlusion. No secondary signs of acute mesenteric ischemia." GI consulted for ongoing colicky abdominal pain. Pain has improved since BM.     Plan:  -No urgent indication for any GI intervention at this point  -Recommend scheduled bowel regimen and bentyl prn  -Recommend prioritizing ICU primary issues (aortic dissection) at this point and reassess in the future  -If she passes clamp trial, can remove NGT as tolerated since she has had several non bloody BM  "

## 2025-07-10 NOTE — PLAN OF CARE
[facility] - Medical ICU  Discharge Reassessment    Primary Care Provider: Sunny Alexandre NP    Expected Discharge Date: 7/14/2025    Reassessment (most recent)       Discharge Reassessment - 07/10/25 1695          Discharge Reassessment    Assessment Type Discharge Planning Reassessment     Did the patient's condition or plan change since previous assessment? No     Discharge Plan discussed with: Spouse/sig other     Communicated ANNABELLE with patient/caregiver Yes     Discharge Plan A Home with family                   Patient remains in ICU due to Esmolol Infusion. Will step down to floor once discontinued. Home with family once medically stable.    Jordyn Galeano RNCM  Case Management  [facility]  [phone]

## 2025-07-10 NOTE — ASSESSMENT & PLAN NOTE
60-year-old female with PMHx of HTN, HLD, and neuropathy, who presents for aortic dissection type B with hypertensive emergency.    CTA: c/w  focal dissection along the right posterior margin of the aorta that extends for a length of approximately 2cm. Vascular Surgery initially consulted and suspects her dissection is subacute or chronic; is not a surgical candidate.    Admitted to the MICU for medical management of AAA dissection.    - Esmolol gtt; titrate to maintain HR < 80 and SBP < 140  - PO lopressor and amlodipine  - Titrate Cardene gtt if BP becomes elevated  - PRN pain medications  - PRN anti-emetics  - Bentyl IM for suspected gas pain (noted on CTA A/P)

## 2025-07-10 NOTE — NURSING
MICU DAILY GOALS     Family/Goals of care/Code Status   Code Status: Full Code    24H Vital Sign Range  Temp:  [98 °F (36.7 °C)-98.5 °F (36.9 °C)]   Pulse:  [72-92]   Resp:  [12-47]   BP: ()/()   SpO2:  [93 %-100 %]      Shift Events (include procedures and significant events)   Esmolol gtt infusing. Replaced K.    AWAKE RASS: Goal -    Actual - RASS ([place] Agitation-Sedation Scale): restless    Restraint necessity: Not necessary   BREATHE SBT: Not intubated    Coordinate A & B, analgesics/sedatives Pain: managed   SAT: Not intubated   Delirium CAM-ICU:     Early(intubated/ Progressive (non-intubated) Mobility MOVE Screen (INTUBATED ONLY): Not intubated    Activity: Activity Management: Arm raise - L1   Feeding/Nutrition Diet order: Diet/Nutrition Received: NPO, ice chips,     Thrombus DVT prophylaxis: VTE Core Measure: Pharmacological prophylaxis initiated/maintained   HOB Elevation Head of Bed (HOB) Positioning: HOB at 20-30 degrees   Ulcer Prophylaxis GI: yes   Glucose control managed     Skin Skin assessment:     Sacrum intact/not altered? Yes  Heels intact/not altered? Yes  Surgical wound? No    CHECK ONE!   (no altered skin or altered skin) and sub boxes:  [x] No Altered Skin Integrity Present    [x]Prevention Measures Documented    [] Altered Skin Integrity Present or Discovered   [] LDA already present in EPIC, daily doc completed              [] LDA added if not already in EPIC (describe/stage wound).               [] Wound Image Taken (required on admit,                   transfer/discharge and every Tuesday)    Wound Care Consulted? No   Bowel Function constipation    Indwelling Catheter Necessity            De-escalation Antibiotics No        VS and assessment per flow sheet, patient progressing towards goals as tolerated, plan of care reviewed with pt Shelbie Santamaria and family, all concerns addressed, will continue to monitor.

## 2025-07-10 NOTE — PROGRESS NOTES
[facility] - Medical ICU  Critical Care Medicine  Progress Note    Patient Name: Shelbie Santamaria  MRN: [id number]  Admission Date: 7/7/2025  Hospital Length of Stay: 3 days  Code Status: Full Code  Attending Provider: Chacorta Whittaker*  Primary Care Provider: Sunny Alexandre NP   Principal Problem: Aortic dissection    Subjective:     HPI:  Ms. Santamaria is a 60yoF with PMHx significant for HTN, HLD, and endometriosis who presented to the ED with c/o general malaise, anorexia, and chills x10 days. Per report, went to lunch today and after trying to eat, had an exacerbation of these ongoing symptoms prompting her presentation to an OSH. Imaging was obtained that demonstrated an aortic dissection in her paravisceral segment; chronicity unknown.   She was transferred to [facility] for further evaluation and Vascular Surgery consult. States long-standing history of foregut and GI illnesses. Chief complaint upon exam in our ED was nausea and epigastric pain. Takes aspirin and Zetia at home and is also a former smoker. Denies back pain, chest pain, shortness of breath, or other complaints. Vascular consulted and deemed not surgical candidate; thought that AAA is either subacute or chronic. Pt. Was placed on esmolol gtt and cardene gtt. Hemodynamic goals of SBP <140 and HR <80 per Vascular recs.    While in the ED, labs and imaging significant for WBC 14.18, H/H 12.9/37.9, plts 327, PTT 26.2, Pt 11.9, INR 1.1, Na 137, K 3.8, Cl 107, CO2 15, glucose 124, BUN 16, creatinine 0.7, ALP 41, AST/ALT 27/13, anion gap 15.    [facility] consulted for medical management of type B aortic dissection. Will admit to the MICU for further management.    Hospital/ICU Course:  07/09/25: repeat CTA of abd/pelvis performed due to abdominal pain. No change in imaging.  07/10/25: Noted to have urinary retention. In and out with 800ml output. Abd pain better. Wants to increase activity. Vascular requesting blood cultures    Interval  History/Significant Events: c/o abd pain. [facility] is discussing possible operative intervention next week    Review of Systems   Respiratory:  Negative for chest tightness and wheezing.    Gastrointestinal:  Negative for abdominal pain, constipation, diarrhea and rectal pain.   Genitourinary:  Positive for decreased urine volume. Negative for flank pain, pelvic pain and vaginal pain.   Musculoskeletal:  Negative for back pain.     Objective:     Vital Signs (Most Recent):  Temp: 98.5 °F (36.9 °C) (07/10/25 1100)  Pulse: 79 (07/10/25 1400)  Resp: 20 (07/10/25 1449)  BP: 136/75 (07/10/25 1400)  SpO2: 97 % (07/10/25 1400) Vital Signs (24h Range):  Temp:  [98 °F (36.7 °C)-98.5 °F (36.9 °C)] 98.5 °F (36.9 °C)  Pulse:  [72-92] 79  Resp:  [12-47] 20  SpO2:  [95 %-100 %] 97 %  BP: ()/() 136/75   Weight: 77.6 kg (171 lb 1.2 oz)  Body mass index is 27.61 kg/m².      Intake/Output Summary (Last 24 hours) at 7/10/2025 1623  Last data filed at 7/10/2025 1440  Gross per 24 hour   Intake 1829.84 ml   Output 625 ml   Net 1204.84 ml          Physical Exam  Vitals and nursing note reviewed.   Constitutional:       General: She is not in acute distress.     Appearance: She is well-developed. She is obese. She is not ill-appearing, toxic-appearing or diaphoretic.   HENT:      Head: Normocephalic and atraumatic.      Right Ear: External ear normal.      Left Ear: External ear normal.      Nose: Nose normal.      Mouth/Throat:      Mouth: Mucous membranes are moist.   Eyes:      Conjunctiva/sclera: Conjunctivae normal.      Pupils: Pupils are equal, round, and reactive to light.   Cardiovascular:      Rate and Rhythm: Normal rate and regular rhythm.      Pulses: Normal pulses.   Pulmonary:      Effort: Pulmonary effort is normal.   Abdominal:      General: Bowel sounds are normal. There is no distension.      Palpations: Abdomen is soft.      Tenderness: There is no abdominal tenderness. There is no guarding or rebound.    Musculoskeletal:         General: No swelling.   Skin:     General: Skin is warm and dry.   Neurological:      Mental Status: She is alert.            Vents:     Lines/Drains/Airways       Drain  Duration                  NG/OG Tube 07/08/25 0919 Right nostril 2 days              Peripheral Intravenous Line  Duration             Peripheral IV Single Lumen 07/07/25 1740 18 G Anterior;Left;Proximal Forearm 2 days    Peripheral IV Single Lumen 07/07/25 2134 20 G Posterior;Right Hand 2 days                  Significant Labs:    CBC/Anemia Profile:  Recent Labs   Lab 07/09/25  0240 07/10/25  0409   WBC 13.72* 14.37*   HGB 13.4 12.9   HCT 40.3 39.4    332   MCV 87 89   RDW 13.9 14.2        Chemistries:  Recent Labs   Lab 07/09/25  0240 07/10/25  0409    142   K 2.9* 3.3*   * 111*   CO2 20* 20*   BUN 9 9   CREATININE 0.7 0.8   CALCIUM 9.4 9.4   ALBUMIN 4.4 4.0   PROT 8.0 7.5   BILITOT 0.5 0.7   ALKPHOS 46 43   ALT 23 26   AST 39 34   MG 3.4* 2.3   PHOS 2.4* 3.3       All pertinent labs within the past 24 hours have been reviewed.    Significant Imaging:  I have reviewed all pertinent imaging results/findings within the past 24 hours.    ABG  Recent Labs   Lab 07/09/25  0056   PH 7.349*   PO2 49   PCO2 37.0   HCO3 20.4*   BE -5*     Assessment/Plan:     Cardiac/Vascular  * Aortic dissection  60-year-old female with PMHx of HTN, HLD, and neuropathy, who presents for aortic dissection type B with hypertensive emergency.    CTA: c/w  focal dissection along the right posterior margin of the aorta that extends for a length of approximately 2cm. Vascular Surgery initially consulted and suspects her dissection is subacute or chronic; is not a surgical candidate.    Admitted to the MICU for medical management of AAA dissection.    - Esmolol gtt; titrate to maintain HR < 80 and SBP < 140  - PO lopressor and amlodipine  - Titrate Cardene gtt if BP becomes elevated  - PRN pain medications  - PRN anti-emetics  -  Bentyl IM for suspected gas pain (noted on CTA A/P)    HTN (hypertension)  Takes amlodipine and metoprolol at baseline.  - Continue home medications. Increase B-blocker  - Telemetry    HLD (hyperlipidemia)  Takes fenofibrate and ezetimibe at baseline.  - Continue home medications    Renal/  Urinary retention  -- likley from opiates/bentyl  - bladder scan q shift  - PRN in and out catheter    Hypokalemia  - Replace PRN to maintain >4    GI  Abdominal pain  Multifactorial  CTA without evidence of bowel ischemia    Nausea & vomiting  Intermittent nausea, vomiting, and abd pain throughout stay. CTA A/P with gaseous distention. KUB with moderate stool burden.    -  on most recent EKG  - Antiemetics PRN; Zofran, phenergan, and droperidol given  - Bentyl IM x1 for suspected gas pain  - NGT placed for decompression in the setting of nausea with vomiting and inability to tolerate PO  - PRN EKG for QTC monitoring  - Strict I/Os  - Serial abd exams  - Bowel reg    Duodenitis  - Dilaudid PRN for pain  - PPI daily  - Bowel regemine  - Bentyl    Critical care time 35 min  Remains on esmolol infusion     Chacorta Whittaker MD  Critical Care Medicine  [facility] - Medical ICU

## 2025-07-10 NOTE — PROGRESS NOTES
[facility] - [facility]  Gastroenterology  Progress Note    Patient Name: Shelbie Santamaria  MRN: [id number]  Admission Date: 7/7/2025  Hospital Length of Stay: 3 days  Code Status: Full Code   Attending Provider: Chacorta Whittaker*  Consulting Provider: Quynh Gregory NP  Primary Care Physician: Sunny Alexandre NP  Principal Problem: Aortic dissection    Subjective:     Interval History: Followed up with patient for abdominal pain and concerns for ileus. NGT in place with BM yesterday. She reports improvement in her abd pain. Continues on esmolol for her Type B dissection. Cardene off at present. Patients main complaint this AM is she is feeling anxious.     Review of Systems   Constitutional:  Positive for activity change. Negative for diaphoresis, fever and unexpected weight change.   HENT:  Negative for sore throat and trouble swallowing.    Eyes:  Negative for redness.   Gastrointestinal:  Positive for abdominal pain. Negative for abdominal distention, anal bleeding, blood in stool, constipation, diarrhea, nausea, rectal pain and vomiting.   Skin:  Negative for color change, pallor and rash.   Neurological:  Positive for weakness. Negative for dizziness, syncope and headaches.     Objective:     Vital Signs (Most Recent):  Temp: 98.5 °F (36.9 °C) (07/10/25 0700)  Pulse: 87 (07/10/25 0855)  Resp: 20 (07/10/25 0855)  BP: (!) 142/74 (07/10/25 0829)  SpO2: 96 % (07/10/25 0855) Vital Signs (24h Range):  Temp:  [98 °F (36.7 °C)-98.8 °F (37.1 °C)] 98.5 °F (36.9 °C)  Pulse:  [72-95] 87  Resp:  [16-47] 20  SpO2:  [94 %-100 %] 96 %  BP: ()/() 142/74     Weight: 77.6 kg (171 lb 1.2 oz) (07/09/25 0637)  Body mass index is 27.61 kg/m².      Intake/Output Summary (Last 24 hours) at 7/10/2025 0912  Last data filed at 7/10/2025 0819  Gross per 24 hour   Intake 1522.59 ml   Output --   Net 1522.59 ml       Lines/Drains/Airways       Peripheral Intravenous Line  Duration             Peripheral IV Single  "Lumen 07/07/25 1740 18 G Anterior;Left;Proximal Forearm 2 days    Peripheral IV Single Lumen 07/07/25 2134 20 G Posterior;Right Hand 2 days                     Physical Exam  Vitals reviewed.   Constitutional:       General: She is not in acute distress.     Appearance: Normal appearance. She is ill-appearing.   HENT:      Mouth/Throat:      Mouth: Mucous membranes are moist.      Pharynx: Oropharynx is clear. No oropharyngeal exudate.   Eyes:      General: No scleral icterus.  Abdominal:      General: Abdomen is flat. Bowel sounds are normal. There is no distension.      Palpations: Abdomen is soft. There is no mass.      Tenderness: There is no abdominal tenderness.      Hernia: No hernia is present.   Skin:     General: Skin is warm and dry.      Capillary Refill: Capillary refill takes less than 2 seconds.      Coloration: Skin is not jaundiced or pale.      Findings: No bruising or erythema.   Neurological:      Mental Status: She is alert and oriented to person, place, and time. Mental status is at baseline.          Significant Labs:  CBC:   Recent Labs   Lab 07/09/25  0240 07/10/25  0409   WBC 13.72* 14.37*   HGB 13.4 12.9   HCT 40.3 39.4    332     CMP:   Recent Labs   Lab 07/10/25  0409      CALCIUM 9.4   ALBUMIN 4.0   PROT 7.5      K 3.3*   CO2 20*   *   BUN 9   CREATININE 0.8   ALKPHOS 43   ALT 26   AST 34   BILITOT 0.7     Coagulation: No results for input(s): "PT", "INR", "APTT" in the last 48 hours.      Significant Imaging:  Imaging results within the past 24 hours have been reviewed.  Assessment/Plan:     GI  Abdominal pain  60F with PMHx of hypertension, hyperlipidemia, endometriosis who presented with sudden onset epigastric pain. She was incidentally found to have short segment paravisceral aortic dissection on imaging and is currently planned to undergo surgical repair next week, as well as hypertensive emergency currently on esmolol and nicardipine. CTA abd w/ "Aortic " "atherosclerosis with mild stenosis at the origins of the celiac and superior mesenteric arteries.  No high-grade stenosis or occlusion. No secondary signs of acute mesenteric ischemia." GI consulted for ongoing colicky abdominal pain. Pain has improved since BM.     Plan:  -No urgent indication for any GI intervention at this point  -Recommend scheduled bowel regimen and bentyl prn  -Recommend prioritizing ICU primary issues (aortic dissection) at this point and reassess in the future  -If she passes clamp trial, can remove NGT as tolerated since she has had several non bloody BM        Thank you for your consult. After careful review of labs and patient current status with the GI staff and fellow, it has been decided that I will sign off. Please contact us if you have any additional questions.    Quynh Gregory, DEANNE  Gastroenterology  [facility] - Medical ICU  "

## 2025-07-10 NOTE — ASSESSMENT & PLAN NOTE
60F with likely incidental discovery of short segment paravisceral aortic dissection w/o malperfusion with concomitant massive gastric distention, antral and duodenal inflammation in patient with long standing GI/enteral complaints and new foregut symptoms.     - Suspect her severe gastric dissection is cause of pain, not her dissection, which may be chronic.  - NG tube in place to decompress  - Recommend ASA with BP goal <140, HR <80.   - gentle IVFs  - DVT Ppx  - PPI    Plan: PMEG pending scheduling

## 2025-07-10 NOTE — SUBJECTIVE & OBJECTIVE
Subjective:     Interval History: Followed up with patient for abdominal pain and concerns for ileus. NGT in place with BM yesterday. She reports improvement in her abd pain. Continues on esmolol for her Type B dissection. Cardene off at present. Patients main complaint this AM is she is feeling anxious.     Review of Systems   Constitutional:  Positive for activity change. Negative for diaphoresis, fever and unexpected weight change.   HENT:  Negative for sore throat and trouble swallowing.    Eyes:  Negative for redness.   Gastrointestinal:  Positive for abdominal pain. Negative for abdominal distention, anal bleeding, blood in stool, constipation, diarrhea, nausea, rectal pain and vomiting.   Skin:  Negative for color change, pallor and rash.   Neurological:  Positive for weakness. Negative for dizziness, syncope and headaches.     Objective:     Vital Signs (Most Recent):  Temp: 98.5 °F (36.9 °C) (07/10/25 0700)  Pulse: 87 (07/10/25 0855)  Resp: 20 (07/10/25 0855)  BP: (!) 142/74 (07/10/25 0829)  SpO2: 96 % (07/10/25 0855) Vital Signs (24h Range):  Temp:  [98 °F (36.7 °C)-98.8 °F (37.1 °C)] 98.5 °F (36.9 °C)  Pulse:  [72-95] 87  Resp:  [16-47] 20  SpO2:  [94 %-100 %] 96 %  BP: ()/() 142/74     Weight: 77.6 kg (171 lb 1.2 oz) (07/09/25 0637)  Body mass index is 27.61 kg/m².      Intake/Output Summary (Last 24 hours) at 7/10/2025 0912  Last data filed at 7/10/2025 0819  Gross per 24 hour   Intake 1522.59 ml   Output --   Net 1522.59 ml       Lines/Drains/Airways       Peripheral Intravenous Line  Duration             Peripheral IV Single Lumen 07/07/25 1740 18 G Anterior;Left;Proximal Forearm 2 days    Peripheral IV Single Lumen 07/07/25 2134 20 G Posterior;Right Hand 2 days                     Physical Exam  Vitals reviewed.   Constitutional:       General: She is not in acute distress.     Appearance: Normal appearance. She is ill-appearing.   HENT:      Mouth/Throat:      Mouth: Mucous membranes are  "moist.      Pharynx: Oropharynx is clear. No oropharyngeal exudate.   Eyes:      General: No scleral icterus.  Abdominal:      General: Abdomen is flat. Bowel sounds are normal. There is no distension.      Palpations: Abdomen is soft. There is no mass.      Tenderness: There is no abdominal tenderness.      Hernia: No hernia is present.   Skin:     General: Skin is warm and dry.      Capillary Refill: Capillary refill takes less than 2 seconds.      Coloration: Skin is not jaundiced or pale.      Findings: No bruising or erythema.   Neurological:      Mental Status: She is alert and oriented to person, place, and time. Mental status is at baseline.          Significant Labs:  CBC:   Recent Labs   Lab 07/09/25  0240 07/10/25  0409   WBC 13.72* 14.37*   HGB 13.4 12.9   HCT 40.3 39.4    332     CMP:   Recent Labs   Lab 07/10/25  0409      CALCIUM 9.4   ALBUMIN 4.0   PROT 7.5      K 3.3*   CO2 20*   *   BUN 9   CREATININE 0.8   ALKPHOS 43   ALT 26   AST 34   BILITOT 0.7     Coagulation: No results for input(s): "PT", "INR", "APTT" in the last 48 hours.      Significant Imaging:  Imaging results within the past 24 hours have been reviewed.  "

## 2025-07-10 NOTE — EICU
Virtual ICU Quality Rounds    Admit Date: 7/7/2025  Hospital Day: 3    ICU Day: 2d 18h    24H Vital Sign Range:  Temp:  [98 °F (36.7 °C)-98.5 °F (36.9 °C)]   Pulse:  [72-92]   Resp:  [12-47]   BP: ()/()   SpO2:  [93 %-100 %]     VICU Surveillance Screening      No skin breakdown visualized

## 2025-07-10 NOTE — SUBJECTIVE & OBJECTIVE
Medications:  Continuous Infusions:   esmolol  0-300 mcg/kg/min Intravenous Continuous 59.6 mL/hr at 07/10/25 0658 250 mcg/kg/min at 07/10/25 0658    nicardipine  0-15 mg/hr Intravenous Continuous 12.5 mL/hr at 07/10/25 0117 2.5 mg/hr at 07/10/25 0117     Scheduled Meds:   amLODIPine  10 mg Oral Daily    aspirin  81 mg Oral Daily    DULoxetine  60 mg Oral Daily    enoxparin  40 mg Subcutaneous Q24H (prophylaxis, 1700)    ezetimibe  10 mg Oral Nightly    fenofibrate  160 mg Oral Daily    lorazepam  0.5 mg Intravenous Once    metoprolol tartrate  50 mg Oral BID    mupirocin   Nasal BID    omega-3 acid ethyl esters  1 g Oral Daily    pantoprazole  40 mg Intravenous Daily     PRN Meds:  Current Facility-Administered Medications:     dextrose 50%, 12.5 g, Intravenous, PRN    glucagon (human recombinant), 1 mg, Intramuscular, PRN    HYDROmorphone, 1 mg, Intravenous, Q3H PRN    insulin aspart U-100, 0-5 Units, Subcutaneous, Q6H PRN    metoclopramide, 10 mg, Intravenous, Q6H PRN    ondansetron, 4 mg, Intravenous, Q6H PRN    polyethylene glycol, 17 g, Oral, Daily PRN    senna-docusate, 1 tablet, Oral, Daily PRN    sodium chloride 0.9%, 10 mL, Intravenous, PRN     Objective:     Vital Signs (Most Recent):  Temp: 98 °F (36.7 °C) (07/10/25 0305)  Pulse: 84 (07/10/25 0657)  Resp: (!) 21 (07/10/25 0602)  BP: (!) 130/59 (07/10/25 0657)  SpO2: 100 % (07/10/25 0400) Vital Signs (24h Range):  Temp:  [98 °F (36.7 °C)-98.8 °F (37.1 °C)] 98 °F (36.7 °C)  Pulse:  [72-95] 84  Resp:  [15-47] 21  SpO2:  [93 %-100 %] 100 %  BP: ()/() 130/59          Physical Exam  HENT:      Head: Normocephalic and atraumatic.      Nose:      Comments: NGT in place with minimal output  Eyes:      Extraocular Movements: Extraocular movements intact.   Pulmonary:      Effort: Pulmonary effort is normal.   Abdominal:      Comments: Mild abdominal tenderness on exam   Skin:     General: Skin is warm.          Significant Labs:  All pertinent labs  from the last 24 hours have been reviewed.    Significant Diagnostics:  I have reviewed all pertinent imaging results/findings within the past 24 hours.

## 2025-07-11 ENCOUNTER — ANESTHESIA EVENT (OUTPATIENT)
Dept: SURGERY | Facility: HOSPITAL | Age: 60
End: 2025-07-11
Payer: COMMERCIAL

## 2025-07-11 LAB
ABSOLUTE EOSINOPHIL (OHS): 0.02 K/UL
ABSOLUTE MONOCYTE (OHS): 1.06 K/UL (ref 0.3–1)
ABSOLUTE NEUTROPHIL COUNT (OHS): 8.62 K/UL (ref 1.8–7.7)
ALBUMIN SERPL BCP-MCNC: 3.4 G/DL (ref 3.5–5.2)
ALBUMIN SERPL BCP-MCNC: 3.8 G/DL (ref 3.5–5.2)
ALP SERPL-CCNC: 37 UNIT/L (ref 40–150)
ALP SERPL-CCNC: 40 UNIT/L (ref 40–150)
ALT SERPL W/O P-5'-P-CCNC: 19 UNIT/L (ref 10–44)
ALT SERPL W/O P-5'-P-CCNC: 20 UNIT/L (ref 10–44)
ANION GAP (OHS): 10 MMOL/L (ref 8–16)
ANION GAP (OHS): 10 MMOL/L (ref 8–16)
AST SERPL-CCNC: 23 UNIT/L (ref 11–45)
AST SERPL-CCNC: 23 UNIT/L (ref 11–45)
BASOPHILS # BLD AUTO: 0.02 K/UL
BASOPHILS NFR BLD AUTO: 0.2 %
BILIRUB SERPL-MCNC: 0.7 MG/DL (ref 0.1–1)
BILIRUB SERPL-MCNC: 0.7 MG/DL (ref 0.1–1)
BUN SERPL-MCNC: 11 MG/DL (ref 8–23)
BUN SERPL-MCNC: 9 MG/DL (ref 8–23)
CALCIUM SERPL-MCNC: 9.2 MG/DL (ref 8.7–10.5)
CALCIUM SERPL-MCNC: 9.6 MG/DL (ref 8.7–10.5)
CHLORIDE SERPL-SCNC: 109 MMOL/L (ref 95–110)
CHLORIDE SERPL-SCNC: 110 MMOL/L (ref 95–110)
CO2 SERPL-SCNC: 17 MMOL/L (ref 23–29)
CO2 SERPL-SCNC: 19 MMOL/L (ref 23–29)
CREAT SERPL-MCNC: 0.6 MG/DL (ref 0.5–1.4)
CREAT SERPL-MCNC: 0.7 MG/DL (ref 0.5–1.4)
ERYTHROCYTE [DISTWIDTH] IN BLOOD BY AUTOMATED COUNT: 13.5 % (ref 11.5–14.5)
GFR SERPLBLD CREATININE-BSD FMLA CKD-EPI: >60 ML/MIN/1.73/M2
GFR SERPLBLD CREATININE-BSD FMLA CKD-EPI: >60 ML/MIN/1.73/M2
GLUCOSE SERPL-MCNC: 92 MG/DL (ref 70–110)
GLUCOSE SERPL-MCNC: 94 MG/DL (ref 70–110)
HCT VFR BLD AUTO: 37.8 % (ref 37–48.5)
HGB BLD-MCNC: 12.6 GM/DL (ref 12–16)
HOLD SPECIMEN: NORMAL
IMM GRANULOCYTES # BLD AUTO: 0.04 K/UL (ref 0–0.04)
IMM GRANULOCYTES NFR BLD AUTO: 0.3 % (ref 0–0.5)
LYMPHOCYTES # BLD AUTO: 2.93 K/UL (ref 1–4.8)
MAGNESIUM SERPL-MCNC: 1.8 MG/DL (ref 1.6–2.6)
MCH RBC QN AUTO: 29.2 PG (ref 27–31)
MCHC RBC AUTO-ENTMCNC: 33.3 G/DL (ref 32–36)
MCV RBC AUTO: 88 FL (ref 82–98)
NUCLEATED RBC (/100WBC) (OHS): 0 /100 WBC
PHOSPHATE SERPL-MCNC: 2.2 MG/DL (ref 2.7–4.5)
PLATELET # BLD AUTO: 285 K/UL (ref 150–450)
PMV BLD AUTO: 9.9 FL (ref 9.2–12.9)
POCT GLUCOSE: 90 MG/DL (ref 70–110)
POCT GLUCOSE: 95 MG/DL (ref 70–110)
POTASSIUM SERPL-SCNC: 3.2 MMOL/L (ref 3.5–5.1)
POTASSIUM SERPL-SCNC: 3.4 MMOL/L (ref 3.5–5.1)
PROT SERPL-MCNC: 6.4 GM/DL (ref 6–8.4)
PROT SERPL-MCNC: 6.9 GM/DL (ref 6–8.4)
RBC # BLD AUTO: 4.32 M/UL (ref 4–5.4)
RELATIVE EOSINOPHIL (OHS): 0.2 %
RELATIVE LYMPHOCYTE (OHS): 23.1 % (ref 18–48)
RELATIVE MONOCYTE (OHS): 8.4 % (ref 4–15)
RELATIVE NEUTROPHIL (OHS): 67.8 % (ref 38–73)
SODIUM SERPL-SCNC: 136 MMOL/L (ref 136–145)
SODIUM SERPL-SCNC: 139 MMOL/L (ref 136–145)
WBC # BLD AUTO: 12.69 K/UL (ref 3.9–12.7)

## 2025-07-11 PROCEDURE — 63600175 PHARM REV CODE 636 W HCPCS: Performed by: GENERAL ACUTE CARE HOSPITAL

## 2025-07-11 PROCEDURE — 63600175 PHARM REV CODE 636 W HCPCS

## 2025-07-11 PROCEDURE — 85025 COMPLETE CBC W/AUTO DIFF WBC: CPT

## 2025-07-11 PROCEDURE — 80053 COMPREHEN METABOLIC PANEL: CPT | Performed by: INTERNAL MEDICINE

## 2025-07-11 PROCEDURE — 20000000 HC ICU ROOM

## 2025-07-11 PROCEDURE — 80053 COMPREHEN METABOLIC PANEL: CPT

## 2025-07-11 PROCEDURE — 83735 ASSAY OF MAGNESIUM: CPT

## 2025-07-11 PROCEDURE — 25000003 PHARM REV CODE 250: Performed by: STUDENT IN AN ORGANIZED HEALTH CARE EDUCATION/TRAINING PROGRAM

## 2025-07-11 PROCEDURE — 25000003 PHARM REV CODE 250: Performed by: GENERAL ACUTE CARE HOSPITAL

## 2025-07-11 PROCEDURE — 25000242 PHARM REV CODE 250 ALT 637 W/ HCPCS

## 2025-07-11 PROCEDURE — 25000003 PHARM REV CODE 250

## 2025-07-11 PROCEDURE — 94761 N-INVAS EAR/PLS OXIMETRY MLT: CPT

## 2025-07-11 PROCEDURE — 84100 ASSAY OF PHOSPHORUS: CPT

## 2025-07-11 PROCEDURE — 99291 CRITICAL CARE FIRST HOUR: CPT | Mod: ,,, | Performed by: INTERNAL MEDICINE

## 2025-07-11 PROCEDURE — 25000003 PHARM REV CODE 250: Performed by: INTERNAL MEDICINE

## 2025-07-11 RX ORDER — MAGNESIUM SULFATE HEPTAHYDRATE 40 MG/ML
2 INJECTION, SOLUTION INTRAVENOUS ONCE
Status: DISCONTINUED | OUTPATIENT
Start: 2025-07-11 | End: 2025-07-14

## 2025-07-11 RX ORDER — LORAZEPAM 2 MG/ML
0.5 INJECTION INTRAMUSCULAR ONCE
Status: COMPLETED | OUTPATIENT
Start: 2025-07-11 | End: 2025-07-11

## 2025-07-11 RX ORDER — METOPROLOL TARTRATE 50 MG/1
150 TABLET ORAL 2 TIMES DAILY
Status: DISCONTINUED | OUTPATIENT
Start: 2025-07-11 | End: 2025-07-15

## 2025-07-11 RX ORDER — POTASSIUM CHLORIDE 7.45 MG/ML
10 INJECTION INTRAVENOUS
Status: DISPENSED | OUTPATIENT
Start: 2025-07-11 | End: 2025-07-11

## 2025-07-11 RX ADMIN — POTASSIUM CHLORIDE 10 MEQ: 7.46 INJECTION, SOLUTION INTRAVENOUS at 09:07

## 2025-07-11 RX ADMIN — ASPIRIN 81 MG CHEWABLE TABLET 81 MG: 81 TABLET CHEWABLE at 08:07

## 2025-07-11 RX ADMIN — ESMOLOL HYDROCHLORIDE 275 MCG/KG/MIN: 20 INJECTION INTRAVENOUS at 03:07

## 2025-07-11 RX ADMIN — ESMOLOL HYDROCHLORIDE 250 MCG/KG/MIN: 20 INJECTION INTRAVENOUS at 12:07

## 2025-07-11 RX ADMIN — DULOXETINE 60 MG: 60 CAPSULE, DELAYED RELEASE ORAL at 08:07

## 2025-07-11 RX ADMIN — MUPIROCIN: 20 OINTMENT TOPICAL at 08:07

## 2025-07-11 RX ADMIN — ESMOLOL HYDROCHLORIDE 200 MCG/KG/MIN: 20 INJECTION INTRAVENOUS at 04:07

## 2025-07-11 RX ADMIN — POTASSIUM CHLORIDE 10 MEQ: 7.46 INJECTION, SOLUTION INTRAVENOUS at 12:07

## 2025-07-11 RX ADMIN — METOPROLOL TARTRATE 100 MG: 50 TABLET, FILM COATED ORAL at 08:07

## 2025-07-11 RX ADMIN — DICYCLOMINE HYDROCHLORIDE 10 MG: 10 CAPSULE ORAL at 08:07

## 2025-07-11 RX ADMIN — ESMOLOL HYDROCHLORIDE 300 MCG/KG/MIN: 20 INJECTION INTRAVENOUS at 06:07

## 2025-07-11 RX ADMIN — METOPROLOL TARTRATE 150 MG: 50 TABLET, FILM COATED ORAL at 08:07

## 2025-07-11 RX ADMIN — HYDROMORPHONE HYDROCHLORIDE 1 MG: 1 INJECTION, SOLUTION INTRAMUSCULAR; INTRAVENOUS; SUBCUTANEOUS at 04:07

## 2025-07-11 RX ADMIN — METOCLOPRAMIDE 10 MG: 5 INJECTION, SOLUTION INTRAMUSCULAR; INTRAVENOUS at 09:07

## 2025-07-11 RX ADMIN — HYDROMORPHONE HYDROCHLORIDE 1 MG: 1 INJECTION, SOLUTION INTRAMUSCULAR; INTRAVENOUS; SUBCUTANEOUS at 07:07

## 2025-07-11 RX ADMIN — HYDROMORPHONE HYDROCHLORIDE 1 MG: 1 INJECTION, SOLUTION INTRAMUSCULAR; INTRAVENOUS; SUBCUTANEOUS at 05:07

## 2025-07-11 RX ADMIN — ESMOLOL HYDROCHLORIDE 275 MCG/KG/MIN: 20 INJECTION INTRAVENOUS at 02:07

## 2025-07-11 RX ADMIN — ESMOLOL HYDROCHLORIDE 300 MCG/KG/MIN: 20 INJECTION INTRAVENOUS at 07:07

## 2025-07-11 RX ADMIN — POTASSIUM CHLORIDE 10 MEQ: 7.46 INJECTION, SOLUTION INTRAVENOUS at 05:07

## 2025-07-11 RX ADMIN — PANTOPRAZOLE SODIUM 40 MG: 40 INJECTION, POWDER, LYOPHILIZED, FOR SOLUTION INTRAVENOUS at 08:07

## 2025-07-11 RX ADMIN — FENOFIBRATE 160 MG: 160 TABLET ORAL at 08:07

## 2025-07-11 RX ADMIN — ESMOLOL HYDROCHLORIDE 225 MCG/KG/MIN: 20 INJECTION INTRAVENOUS at 10:07

## 2025-07-11 RX ADMIN — ESMOLOL HYDROCHLORIDE 300 MCG/KG/MIN: 20 INJECTION INTRAVENOUS at 10:07

## 2025-07-11 RX ADMIN — HYDRALAZINE HYDROCHLORIDE 20 MG: 20 INJECTION, SOLUTION INTRAMUSCULAR; INTRAVENOUS at 09:07

## 2025-07-11 RX ADMIN — ESMOLOL HYDROCHLORIDE 300 MCG/KG/MIN: 20 INJECTION INTRAVENOUS at 11:07

## 2025-07-11 RX ADMIN — DICYCLOMINE HYDROCHLORIDE 10 MG: 10 CAPSULE ORAL at 02:07

## 2025-07-11 RX ADMIN — POTASSIUM CHLORIDE 60 MEQ: 7.46 INJECTION, SOLUTION INTRAVENOUS at 10:07

## 2025-07-11 RX ADMIN — ENOXAPARIN SODIUM 40 MG: 40 INJECTION SUBCUTANEOUS at 04:07

## 2025-07-11 RX ADMIN — EZETIMIBE 10 MG: 10 TABLET ORAL at 08:07

## 2025-07-11 RX ADMIN — ESMOLOL HYDROCHLORIDE 175 MCG/KG/MIN: 20 INJECTION INTRAVENOUS at 08:07

## 2025-07-11 RX ADMIN — SODIUM PHOSPHATE, MONOBASIC, MONOHYDRATE AND SODIUM PHOSPHATE, DIBASIC, ANHYDROUS 20.1 MMOL: 142; 276 INJECTION, SOLUTION INTRAVENOUS at 07:07

## 2025-07-11 RX ADMIN — AMLODIPINE BESYLATE 10 MG: 10 TABLET ORAL at 08:07

## 2025-07-11 RX ADMIN — POTASSIUM CHLORIDE 10 MEQ: 7.46 INJECTION, SOLUTION INTRAVENOUS at 07:07

## 2025-07-11 RX ADMIN — ESMOLOL HYDROCHLORIDE 200 MCG/KG/MIN: 20 INJECTION INTRAVENOUS at 05:07

## 2025-07-11 RX ADMIN — HYDROMORPHONE HYDROCHLORIDE 1 MG: 1 INJECTION, SOLUTION INTRAMUSCULAR; INTRAVENOUS; SUBCUTANEOUS at 11:07

## 2025-07-11 RX ADMIN — ESMOLOL HYDROCHLORIDE 300 MCG/KG/MIN: 20 INJECTION INTRAVENOUS at 05:07

## 2025-07-11 RX ADMIN — ESMOLOL HYDROCHLORIDE 200 MCG/KG/MIN: 20 INJECTION INTRAVENOUS at 06:07

## 2025-07-11 RX ADMIN — HYDROMORPHONE HYDROCHLORIDE 1 MG: 1 INJECTION, SOLUTION INTRAMUSCULAR; INTRAVENOUS; SUBCUTANEOUS at 02:07

## 2025-07-11 RX ADMIN — LORAZEPAM 0.5 MG: 2 INJECTION INTRAMUSCULAR; INTRAVENOUS at 11:07

## 2025-07-11 RX ADMIN — MAGNESIUM SULFATE HEPTAHYDRATE 2 G: 40 INJECTION, SOLUTION INTRAVENOUS at 05:07

## 2025-07-11 RX ADMIN — HYDROMORPHONE HYDROCHLORIDE 1 MG: 1 INJECTION, SOLUTION INTRAMUSCULAR; INTRAVENOUS; SUBCUTANEOUS at 10:07

## 2025-07-11 RX ADMIN — POTASSIUM CHLORIDE 10 MEQ: 7.46 INJECTION, SOLUTION INTRAVENOUS at 11:07

## 2025-07-11 RX ADMIN — ESMOLOL HYDROCHLORIDE 300 MCG/KG/MIN: 20 INJECTION INTRAVENOUS at 09:07

## 2025-07-11 NOTE — SUBJECTIVE & OBJECTIVE
Medications:  Continuous Infusions:   esmolol  0-300 mcg/kg/min Intravenous Continuous 47.6 mL/hr at 07/11/25 0758 200 mcg/kg/min at 07/11/25 0758     Scheduled Meds:   amLODIPine  10 mg Oral Daily    aspirin  81 mg Oral Daily    DULoxetine  60 mg Oral Daily    enoxparin  40 mg Subcutaneous Q24H (prophylaxis, 1700)    ezetimibe  10 mg Oral Nightly    fenofibrate  160 mg Oral Daily    magnesium sulfate 2 g IVPB  2 g Intravenous Once    metoprolol tartrate  100 mg Oral BID    mupirocin   Nasal BID    pantoprazole  40 mg Intravenous Daily    potassium chloride  10 mEq Intravenous Q1H     PRN Meds:  Current Facility-Administered Medications:     calcium gluconate IVPB, 1 g, Intravenous, PRN    calcium gluconate IVPB, 2 g, Intravenous, PRN    calcium gluconate IVPB, 3 g, Intravenous, PRN    dextrose 50%, 12.5 g, Intravenous, PRN    dicyclomine, 10 mg, Oral, QID PRN    glucagon (human recombinant), 1 mg, Intramuscular, PRN    hydrALAZINE, 20 mg, Intravenous, Q4H PRN    HYDROmorphone, 1 mg, Intravenous, Q3H PRN    insulin aspart U-100, 0-5 Units, Subcutaneous, Q6H PRN    labetalol, 10 mg, Intravenous, Q4H PRN    magnesium sulfate 2 g IVPB, 2 g, Intravenous, PRN    magnesium sulfate 2 g IVPB, 2 g, Intravenous, PRN    metoclopramide, 10 mg, Intravenous, Q6H PRN    ondansetron, 4 mg, Intravenous, Q6H PRN    polyethylene glycol, 17 g, Oral, Daily PRN    potassium chloride, 40 mEq, Intravenous, PRN **AND** potassium chloride, 60 mEq, Intravenous, PRN **AND** potassium chloride, 80 mEq, Intravenous, PRN    senna-docusate, 1 tablet, Oral, Daily PRN    sodium chloride 0.9%, 10 mL, Intravenous, PRN    sodium phosphate 15 mmol in D5W 250 mL IVPB, 15 mmol, Intravenous, PRN    sodium phosphate 20.1 mmol in D5W 250 mL IVPB, 20.1 mmol, Intravenous, PRN    sodium phosphate 30 mmol in D5W 250 mL IVPB, 30 mmol, Intravenous, PRN     Objective:     Vital Signs (Most Recent):  Temp: 97.3 °F (36.3 °C) (07/11/25 0700)  Pulse: 72 (07/11/25  0802)  Resp: (!) 21 (07/11/25 0800)  BP: (!) 159/74 (07/11/25 0800)  SpO2: 100 % (07/11/25 0800) Vital Signs (24h Range):  Temp:  [97.3 °F (36.3 °C)-98.5 °F (36.9 °C)] 97.3 °F (36.3 °C)  Pulse:  [71-90] 72  Resp:  [12-47] 21  SpO2:  [92 %-100 %] 100 %  BP: ()/() 159/74     Date 07/11/25 0700 - 07/12/25 0659   Shift 3165-7453 9828-4545 4897-8452 24 Hour Total   INTAKE   I.V.(mL/kg) 77(1)   77(1)   IV Piggyback 40.3   40.3   Shift Total(mL/kg) 117.3(1.5)   117.3(1.5)   OUTPUT   Shift Total(mL/kg)       Weight (kg) 77.6 77.6 77.6 77.6        Physical Exam  HENT:      Head: Normocephalic and atraumatic.      Nose:      Comments: NGT in place with minimal output  Eyes:      Extraocular Movements: Extraocular movements intact.   Pulmonary:      Effort: Pulmonary effort is normal.   Abdominal:      General: There is no distension.      Palpations: Abdomen is soft. There is no mass.      Tenderness: There is no guarding or rebound.      Comments: Mild abdominal tenderness on exam   Skin:     General: Skin is warm.   Psychiatric:      Comments: Pt appears more delirious/confused than prior.          Significant Labs:  All pertinent labs from the last 24 hours have been reviewed.    Significant Diagnostics:  I have reviewed and interpreted all pertinent imaging results/findings within the past 24 hours.

## 2025-07-11 NOTE — SUBJECTIVE & OBJECTIVE
Interval History/Significant Events: c/o abd pain. [facility] is discussing possible operative intervention next week    Review of Systems   Respiratory:  Negative for chest tightness and wheezing.    Gastrointestinal:  Negative for abdominal pain, constipation, diarrhea and rectal pain.   Genitourinary:  Negative for flank pain, pelvic pain and vaginal pain.   Musculoskeletal:  Negative for back pain.     Objective:     Vital Signs (Most Recent):  Temp: 98 °F (36.7 °C) (07/11/25 1500)  Pulse: 88 (07/11/25 1600)  Resp: 20 (07/11/25 1648)  BP: 137/66 (07/11/25 1600)  SpO2: 100 % (07/11/25 1600) Vital Signs (24h Range):  Temp:  [97.3 °F (36.3 °C)-98.4 °F (36.9 °C)] 98 °F (36.7 °C)  Pulse:  [71-90] 88  Resp:  [14-47] 20  SpO2:  [92 %-100 %] 100 %  BP: ()/() 137/66   Weight: 77.6 kg (171 lb 1.2 oz)  Body mass index is 27.61 kg/m².      Intake/Output Summary (Last 24 hours) at 7/11/2025 1710  Last data filed at 7/11/2025 1604  Gross per 24 hour   Intake 2420.9 ml   Output 600 ml   Net 1820.9 ml          Physical Exam  Vitals and nursing note reviewed.   Constitutional:       General: She is not in acute distress.     Appearance: She is well-developed. She is obese. She is not ill-appearing, toxic-appearing or diaphoretic.   HENT:      Head: Normocephalic and atraumatic.      Comments: NGT in place     Right Ear: External ear normal.      Left Ear: External ear normal.      Nose: Nose normal.      Mouth/Throat:      Mouth: Mucous membranes are moist.   Eyes:      Conjunctiva/sclera: Conjunctivae normal.      Pupils: Pupils are equal, round, and reactive to light.   Cardiovascular:      Rate and Rhythm: Normal rate and regular rhythm.      Pulses: Normal pulses.   Pulmonary:      Effort: Pulmonary effort is normal.   Abdominal:      General: Bowel sounds are normal. There is no distension.      Palpations: Abdomen is soft.      Tenderness: There is no abdominal tenderness. There is no guarding or rebound.    Musculoskeletal:         General: No swelling.   Skin:     General: Skin is warm and dry.   Neurological:      Mental Status: She is alert.            Vents:     Lines/Drains/Airways       Drain  Duration                  NG/OG Tube 07/08/25 0919 Right nostril 3 days              Peripheral Intravenous Line  Duration             Peripheral IV Single Lumen 07/11/25 1100 18 G 1 3/4 in Anterior;Distal;Left Upper Arm <1 day    Peripheral IV Single Lumen 07/11/25 1600 20 G 1 3/4 in Anterior;Left Forearm <1 day                  Significant Labs:    CBC/Anemia Profile:  Recent Labs   Lab 07/10/25  0409 07/11/25  0405   WBC 14.37* 12.69   HGB 12.9 12.6   HCT 39.4 37.8    285   MCV 89 88   RDW 14.2 13.5        Chemistries:  Recent Labs   Lab 07/10/25  0409 07/11/25 0405    139   K 3.3* 3.4*   * 110   CO2 20* 19*   BUN 9 11   CREATININE 0.8 0.7   CALCIUM 9.4 9.6   ALBUMIN 4.0 3.8   PROT 7.5 6.9   BILITOT 0.7 0.7   ALKPHOS 43 40   ALT 26 20   AST 34 23   MG 2.3 1.8   PHOS 3.3 2.2*       All pertinent labs within the past 24 hours have been reviewed.    Significant Imaging:  I have reviewed all pertinent imaging results/findings within the past 24 hours.

## 2025-07-11 NOTE — PROGRESS NOTES
[facility] - Medical [facility]  Adult Nutrition  Progress Note    SUMMARY       Recommendations    1. Recommend regular diet when diet is advanced and clinically indicated     - please continue to document PO % intake via flowsheets    2. Encourage good intake    3. RD to monitor weight, labs, meds, intake, tolerance  4. Provide nutrition within 48 hours.   5. If unable to provide nutrition within 48 hours, consider continuous TF recommendation: Isosource 1.5 @ 50 mL/hr to provide 1200 mL total volume, 1800 kcal, 211 g CHO, 82 g protein, 18 g fiber, 917 mL free water   -Fluid per MD    Goal #1: PO intake will meet greater than or equal to 75% of estimated needs by RD follow up  Nutrition Goal Status #1: new    Goal #2: Maintain weight throughout hospitalization  Nutrition Goal Status #2: new    Goal #3: Provide nutrition within 48 hours  Nutrition Goal Status #3: new    Communication of RD Recs:  (POC)    Nutrition Discharge Planning    Nutrition Discharge Planning: Too early to determine, pending clinical course    Reason for Assessment    Reason For Assessment: NPO/clear liquids x 5 days  Diagnosis:  (Aortic dissection)  General Information Comments: Pt admitted for aortic dissection. PMHx HTN, HLD, and endometriosis. NG tube placed 7/8.Presented with anorexia x 10 days. Complaint of nausea.She began with indiestion and nausea after eating [employer] on Monday, after that she hasn't been eating( 4 D) Pt passed swallow evaluation but shortly after oral intake she reported cramping, burning abdominal pain and nausea. Experiences hunger pain but after taking a small amount she feels bloated, distended and experiences pain. Pt pending for a clamp trial to determine if NGT can be removed. No wounds noted, Edema of 1+,2+ noted. Pt denied wt loss but per chart review, wt loss noted of 7% x 3 mo.. Pt is at risk for malnutrition. NFPE at f/u if medically warranted.    Nutrition/Diet History    Spiritual, Cultural Beliefs,  "[employer] Practices, Values that Affect Care: no  Food Allergies: NKFA  Factors Affecting Nutritional Intake: NPO, decreased appetite, abdominal distension, abdominal pain, altered gastrointestinal function  Nutrition-related SDOH: None Identified    Anthropometrics    Height: 5' 6" (167.6 cm)  Height (inches): 66 in  Weight: 77.6 kg (171 lb 1.2 oz)  Weight (lb): 171.08 lb  Weight Method: Bed Scale  Ideal Body Weight (IBW), Female: 130 lb  % Ideal Body Weight, Female (lb): 134.62 %  BMI (Calculated): 27.6  BMI Grade: 25 - 29.9 - overweight    Lab/Procedures/Meds    Pertinent Labs Reviewed: reviewed  Pertinent Labs Comments: K 3.4, phos 2.2,  Pertinent Medications Reviewed: reviewed  Pertinent Medications Comments: Esmolol in sodium chloride, potassium chloride, pantoprazole, magnesium sulfate, ezetimibe, fenofibrate    Estimated/Assessed Needs    Weight Used For Calorie Calculations: 77.6 kg (171 lb 1.2 oz)  Energy Calorie Requirements (kcal): 8611-4371 kcal (20-25 kcal/kg)  Energy Need Method: Kcal/kg  Protein Requirements: 78-93 g/pro (1.0-1.2 g/kg)  Weight Used For Protein Calculations: 77.6 kg (171 lb 1.2 oz)    RDA Method (mL): 1552  CHO Requirement: 192-243    Nutrition Prescription Ordered    Current Diet Order: NPO    Evaluation of Received Nutrient/Fluid Intake    I/O: +5090.2 since admit  Energy Calories Required: not meeting needs  Protein Required: not meeting needs  Fluid Required:  (per MD)  Comments: LBM 7/9  Tolerance: not tolerating  % Intake of Estimated Energy Needs: 0 - 25 %  % Meal Intake: NPO      Nutrition Risk    Level of Risk/Frequency of Follow-up: moderate - high (1-2/wk)     Monitor and Evaluation    Monitor and Evaluation: Weight, Beliefs and attitudes, Electrolyte and renal panel, Skin, Nutrition focused physical findings, Lipid profile, Glucose/endocrine profile, Gastrointestinal profile, Inflammatory profile     Nutrition Follow-Up    RD Follow-up?: Yes      "

## 2025-07-11 NOTE — EICU
Virtual ICU Quality Rounds    Admit Date: 7/7/2025  Hospital Day: 4    ICU Day: 3d 8h    24H Vital Sign Range:  Temp:  [97.3 °F (36.3 °C)-98.5 °F (36.9 °C)]   Pulse:  [71-90]   Resp:  [12-47]   BP: ()/()   SpO2:  [92 %-100 %]     VICU Surveillance Screening  LDA reconciliation : Yes

## 2025-07-11 NOTE — PLAN OF CARE
Recommendations    1. Recommend regular diet when diet is advanced and clinically indicated     - please continue to document PO % intake via flowsheets    2. Encourage good intake    3. RD to monitor weight, labs, meds, intake, tolerance  4. Provide nutrition within 48 hours.   5. If unable to provide nutrition within 48 hours, consider continuous TF recommendation: Isosource 1.5 @ 50 mL/hr to provide 1200 mL total volume, 1800 kcal, 211 g CHO, 82 g protein, 18 g fiber, 917 mL free water   -Fluid per MD    Goal #1: PO intake will meet greater than or equal to 75% of estimated needs by RD follow up  Nutrition Goal Status #1: new    Goal #2: Maintain weight throughout hospitalization  Nutrition Goal Status #2: new    Goal #3: Provide nutrition within 48 hours  Nutrition Goal Status #3: new    Communication of RD Recs:  (POC)    Nutrition Discharge Planning    Nutrition Discharge Planning: Too early to determine, pending clinical course

## 2025-07-11 NOTE — ASSESSMENT & PLAN NOTE
60F with likely incidental discovery of short segment paravisceral aortic dissection w/o malperfusion with concomitant massive gastric distention, antral and duodenal inflammation in patient with long standing GI/enteral complaints and new foregut symptoms.     - Abdominal pain is atypical for aortic dissection presentation. Would request GI to evaluate further for GI pain.  - Suspect her severe gastric distension is cause of pain, not her aortic dissection which may be chronic.  - NG tube in place for decompression, although minimal output thus far.  - recommend BP goal <140, HR <80, on continuous esmolol  - recommend ASA  - given her mild confusion, Blood and Urine Cx ordered   - gentle IVFs  - DVT Ppx  - PPI    Plan: PMEG on Monday 7/14

## 2025-07-11 NOTE — ANESTHESIA PREPROCEDURE EVALUATION
[facility]  Anesthesia Pre-Operative Evaluation     Patient Name: Shelbie Santamaria  YOB: 1965  MRN: [id number]  [facility]: [id number]      Code Status: Full Code   Date of Procedure: 7/14/2025  Anesthesia: General Procedure: Procedure(s) (LRB):  REPAIR, ANEURYSM, AORTA, ENDOVASCULAR (N/A)  Pre-Operative Diagnosis: Aortic dissection [I71.00]  Proceduralist: Surgeons and Role:     * Chata Pena MD - Primary     * Dayton Saenz MD - Co-Surgeon          SUBJECTIVE:     Pre-operative evaluation for Procedure(s) (LRB):  REPAIR, ANEURYSM, AORTA, ENDOVASCULAR (N/A)     07/11/2025    Shelbie Santamaria is a 60 y.o. female with aortic dissection, HLD, HTN, chronic migraine and prediabetes.      She initially presented to the ED with c/o general malaise, anorexia, and chills x10 days. Per report, went to lunch today and after trying to eat, had an exacerbation of these ongoing symptoms prompting her presentation to an OSH. Imaging was obtained that demonstrated an aortic dissection in her paravisceral segment; chronicity unknown. She was transferred to [facility] for further evaluation and Vascular Surgery consult. Patient also with severe gastric distention. Initially thought may be cause of her pain, now vascular surgery is planning for AAA repair.     Patient now presents for the above procedure(s).       Anticoagulants   Medication Route Frequency    enoxaparin injection 40 mg Subcutaneous Q24H (prophylaxis, 1700)        ________________________________________  Results for orders placed during the hospital encounter of 07/07/25    Echo    Interpretation Summary    Left Ventricle: The left ventricle is normal in size. Ventricular mass is normal. Normal wall thickness. Normal wall motion. There is normal systolic function with a visually estimated ejection fraction of 60 - 65%. There is normal diastolic function.    Right Ventricle: The right ventricle is normal in  size measuring 2.9 cm. Wall thickness is normal. Systolic function is normal.    Left Atrium: The left atrium is normal in size    Right Atrium: The right atrium is normal in size.    Aortic Valve: The aortic valve is a trileaflet valve. There is mild aortic valve sclerosis. There is mild annular calcification present.    Aorta: The aortic root is normal in size measuring 2.8 cm. The proximal ascending aorta is normal in size.    IVC/SVC: Normal venous pressure at 3 mmHg.    Pericardium: There is no pericardial effusion.    ________________________________________    Prev airway: None documented.            LDA:   Peripheral IV Single Lumen 07/07/25 1740 18 G Anterior;Left;Proximal Forearm (Active)   Site Assessment Clean;Intact;Dry;No redness;No swelling 07/11/25 0701   Line Securement Device Secured with sutureless device 07/11/25 0701   Extremity Assessment Distal to IV No abnormal discoloration;No swelling;No redness;No warmth 07/11/25 0701   Line Status Infusing 07/11/25 0701   Dressing Status Clean;Dry;Intact 07/11/25 0701   Dressing Intervention Integrity maintained 07/11/25 0701   Dressing Change Due 07/11/25 07/11/25 0305   Site Change Due 07/09/25 07/09/25 1501   Reason Not Rotated Not due 07/11/25 0305   Number of days: 3       Peripheral IV Single Lumen 07/07/25 2134 20 G Posterior;Right Hand (Active)   Site Assessment Clean;Dry;Intact;No redness;No swelling 07/11/25 0701   Line Securement Device Secured with sutureless device 07/11/25 0701   Extremity Assessment Distal to IV No abnormal discoloration;No redness;No swelling;No warmth 07/11/25 0701   Line Status Infusing 07/11/25 0701   Dressing Status Clean;Dry;Intact 07/11/25 0701   Dressing Intervention Integrity maintained 07/11/25 0701   Dressing Change Due 07/11/25 07/11/25 0305   Site Change Due 07/09/25 07/09/25 1501   Reason Not Rotated Not due 07/11/25 0305   Number of days: 3            NG/OG Tube 07/08/25 0919 Right nostril (Active)    Placement Check placement verified by x-ray 07/10/25 0701   Tolerance no signs/symptoms of discomfort 25   Securement secured to nostril center 25   Clamp Status/Tolerance unclamped;no emesis;no residual;no restlessness;no nausea 25   Insertion Site Appearance no redness, warmth, tenderness, skin breakdown, drainage 25   Intake (mL) 80 mL 07/10/25 1300   Number of days: 2       Drips:    esmolol  0-300 mcg/kg/min Intravenous Continuous 47.6 mL/hr at 258 200 mcg/kg/min at 25       Problem List[1]    Review of patient's allergies indicates:   Allergen Reactions    Actifed plus      As a child    Erythromycin Other (See Comments)     unknown    Trazodone Palpitations       Current Inpatient Medications:    amLODIPine  10 mg Oral Daily    aspirin  81 mg Oral Daily    DULoxetine  60 mg Oral Daily    enoxparin  40 mg Subcutaneous Q24H (prophylaxis, 1700)    ezetimibe  10 mg Oral Nightly    fenofibrate  160 mg Oral Daily    magnesium sulfate 2 g IVPB  2 g Intravenous Once    metoprolol tartrate  100 mg Oral BID    mupirocin   Nasal BID    pantoprazole  40 mg Intravenous Daily    potassium chloride  10 mEq Intravenous Q1H       Medications Ordered Prior to Encounter[2]    Past Surgical History:   Procedure Laterality Date    APPENDECTOMY      BREAST LUMPECTOMY      right     SECTION      COLONOSCOPY      CYSTOSCOPY      HYSTERECTOMY      pain- MI/BSO    PELVIC LAPAROSCOPY      TONSILLECTOMY         Social History:  Tobacco Use: Medium Risk (2025)    Patient History     Smoking Tobacco Use: Former     Smokeless Tobacco Use: Never     Passive Exposure: Not on file       Alcohol Use: Not on file       OBJECTIVE:     Vital Signs Range:  BMI Readings from Last 1 Encounters:   25 27.61 kg/m²       Temp:  [36.3 °C (97.3 °F)-36.7 °C (98 °F)]   Pulse:  [72-90]   Resp:  [14-47]   BP: ()/()   SpO2:  [96 %-100 %]        Significant  Labs:        Component Value Date/Time    WBC 12.69 07/11/2025 0405    HGB 12.6 07/11/2025 0405    HCT 37.8 07/11/2025 0405    HCT 35 (L) 07/08/2025 0541     07/11/2025 0405     07/11/2025 0405    K 3.4 (L) 07/11/2025 0405     07/11/2025 0405    CO2 19 (L) 07/11/2025 0405    GLU 94 07/11/2025 0405    BUN 11 07/11/2025 0405    CREATININE 0.7 07/11/2025 0405    MG 1.8 07/11/2025 0405    PHOS 2.2 (L) 07/11/2025 0405    CALCIUM 9.6 07/11/2025 0405    ALBUMIN 3.8 07/11/2025 0405    PROT 6.9 07/11/2025 0405    ALKPHOS 40 07/11/2025 0405    BILITOT 0.7 07/11/2025 0405    AST 23 07/11/2025 0405    ALT 20 07/11/2025 0405    INR 1.1 07/07/2025 1754        Please see Results Review for additional labs.     Diagnostic Studies: No relevant studies.    EKG:   Results for orders placed or performed during the hospital encounter of 07/07/25   EKG 12-lead    Collection Time: 07/09/25  1:44 AM   Result Value Ref Range    QRS Duration 96 ms    OHS QTC Calculation 490 ms    Narrative    Test Reason : R10.9,    Vent. Rate :  85 BPM     Atrial Rate :  85 BPM     P-R Int : 166 ms          QRS Dur :  96 ms      QT Int : 412 ms       P-R-T Axes :  71  52  54 degrees    QTcB Int : 490 ms    Normal sinus rhythm  Prolonged QT  Abnormal ECG  When compared with ECG of 07-Jul-2025 17:51,  No significant change was found  Confirmed by Chirag Barkley ([id number]) on 7/9/2025 8:22:55 AM    Referred By: PATRICIA CORRAL           Confirmed By: Chirag Barkley       ECHO:  See subjective, if available.      ASSESSMENT/PLAN:           Pre-op Assessment    I have reviewed the Patient Summary Reports.     I have reviewed the Nursing Notes. I have reviewed the NPO Status.   I have reviewed the Medications.     Review of Systems  Anesthesia Hx:   History of prior surgery of interest to airway management or planning:            Denies Personal Hx of Anesthesia complications.                    Cardiovascular:     Hypertension                                     Hypertension         Neurological:    Neuromuscular Disease,  Headaches      Dx of Headaches                         Neuromuscular Disease       Physical Exam  General: Well nourished, Cooperative, Alert and Oriented    Airway:  Mallampati: II / II  Mouth Opening: Normal  TM Distance: Normal  Tongue: Normal  Neck ROM: Normal ROM    Dental:  Intact    Chest/Lungs:  Normal Respiratory Rate        Anesthesia Plan  Type of Anesthesia, risks & benefits discussed:    Anesthesia Type: Gen ETT, Regional  Intra-op Monitoring Plan: Standard ASA Monitors, Art Line and Central Line  Post Op Pain Control Plan: multimodal analgesia, IV/PO Opioids PRN and peripheral nerve block  Induction:  IV  Airway Plan: Direct and Video, Post-Induction  Informed Consent: Informed consent signed with the Patient and all parties understand the risks and agree with anesthesia plan.  All questions answered.   ASA Score: 3  Day of Surgery Review of History & Physical: H&P Update referred to the surgeon/provider.    Ready For Surgery From Anesthesia Perspective.     .           [1]   Patient Active Problem List  Diagnosis    Polyneuropathy    HLD (hyperlipidemia)    HTN (hypertension)    Prediabetes    Chronic migraine without aura without status migrainosus, not intractable    Duodenitis    Aortic dissection    Nausea & vomiting    Hypokalemia    Abdominal pain    Urinary retention   [2]   No current facility-administered medications on file prior to encounter.     Current Outpatient Medications on File Prior to Encounter   Medication Sig Dispense Refill    aspirin (ECOTRIN) 81 MG EC tablet Take 81 mg by mouth once daily.      cetirizine (ZYRTEC) 10 MG tablet Take 10 mg by mouth once daily.      cholecalciferol, vitamin D3, 125 mcg (5,000 unit) Tab Take 5,000 Units by mouth 2 (two) times daily.      clobetasol 0.05% (TEMOVATE) 0.05 % Oint Apply topically 2 (two) times daily. 45 g 0    DULoxetine (CYMBALTA) 60 MG capsule Take 1 capsule (60 mg  total) by mouth once daily. 90 capsule 1    estradioL (ESTRACE) 0.01 % (0.1 mg/gram) vaginal cream Place 1 g vaginally twice a week. 42.5 g 2    eszopiclone (LUNESTA) 3 mg Tab Take 3 mg by mouth every evening.      ezetimibe (ZETIA) 10 mg tablet Take 10 mg by mouth nightly.  4    fenofibrate 160 MG Tab Take 160 mg by mouth once daily.      fish oil-omega-3 fatty acids 300-1,000 mg capsule Take 1 capsule by mouth 2 (two) times daily.      furosemide (LASIX) 40 MG tablet furosemide 40 mg tablet   TAKE 1 TABLET BY MOUTH EVERY DAY IN THE MORNING.      methocarbamoL (ROBAXIN) 750 MG Tab Take 1 tablet (750 mg total) by mouth 3 (three) times daily as needed (leg cramping). 270 tablet 1    metoprolol tartrate (LOPRESSOR) 50 MG tablet Take 50 mg by mouth 2 (two) times daily.       potassium chloride SA (K-DUR,KLOR-CON) 10 MEQ tablet Take 10 mEq by mouth once daily.

## 2025-07-11 NOTE — NURSING
MICU DAILY GOALS     Family/Goals of care/Code Status   Code Status: Full Code    24H Vital Sign Range  Temp:  [97.3 °F (36.3 °C)-98.4 °F (36.9 °C)]   Pulse:  [71-90]   Resp:  [14-47]   BP: ()/()   SpO2:  [92 %-100 %]      Shift Events (include procedures and significant events)   No acute events throughout shift    AWAKE RASS: Goal -    Actual - RASS ([place] Agitation-Sedation Scale): restless    Restraint necessity: Not necessary   BREATHE SBT: Not intubated    Coordinate A & B, analgesics/sedatives Pain: managed   SAT: Not intubated   Delirium CAM-ICU:     Early(intubated/ Progressive (non-intubated) Mobility MOVE Screen (INTUBATED ONLY): Not intubated    Activity: Activity Management: Rolling - L1   Feeding/Nutrition Diet order: Diet/Nutrition Received: ice chips, NPO,     Thrombus DVT prophylaxis: VTE Core Measure: Pharmacological prophylaxis initiated/maintained   HOB Elevation Head of Bed (HOB) Positioning: HOB elevated   Ulcer Prophylaxis GI: yes   Glucose control managed     Skin Skin assessment:     Sacrum intact/not altered? Yes  Heels intact/not altered? Yes  Surgical wound? No    CHECK ONE!   (no altered skin or altered skin) and sub boxes:  [x] No Altered Skin Integrity Present    [x]Prevention Measures Documented    [] Altered Skin Integrity Present or Discovered   [] LDA already present in EPIC, daily doc completed              [] LDA added if not already in EPIC (describe/stage wound).               [] Wound Image Taken (required on admit,                   transfer/discharge and every Tuesday)    Wound Care Consulted? No   Bowel Function constipation    Indwelling Catheter Necessity            De-escalation Antibiotics No        VS and assessment per flow sheet, patient progressing towards goals as tolerated, plan of care reviewed with pt Shelbie Santamaria and family, all concerns addressed, will continue to monitor.

## 2025-07-11 NOTE — PROGRESS NOTES
[facility] - Medical ICU  Critical Care Medicine  Progress Note    Patient Name: Shelbie Santamaria  MRN: [id number]  Admission Date: 7/7/2025  Hospital Length of Stay: 4 days  Code Status: Full Code  Attending Provider: Chacorta Whittaker*  Primary Care Provider: Sunny Alexandre NP   Principal Problem: Aortic dissection    Subjective:     HPI:  Ms. Santamaria is a 60yoF with PMHx significant for HTN, HLD, and endometriosis who presented to the ED with c/o general malaise, anorexia, and chills x10 days. Per report, went to lunch today and after trying to eat, had an exacerbation of these ongoing symptoms prompting her presentation to an OSH. Imaging was obtained that demonstrated an aortic dissection in her paravisceral segment; chronicity unknown.   She was transferred to [facility] for further evaluation and Vascular Surgery consult. States long-standing history of foregut and GI illnesses. Chief complaint upon exam in our ED was nausea and epigastric pain. Takes aspirin and Zetia at home and is also a former smoker. Denies back pain, chest pain, shortness of breath, or other complaints. Vascular consulted and deemed not surgical candidate; thought that AAA is either subacute or chronic. Pt. Was placed on esmolol gtt and cardene gtt. Hemodynamic goals of SBP <140 and HR <80 per Vascular recs.    While in the ED, labs and imaging significant for WBC 14.18, H/H 12.9/37.9, plts 327, PTT 26.2, Pt 11.9, INR 1.1, Na 137, K 3.8, Cl 107, CO2 15, glucose 124, BUN 16, creatinine 0.7, ALP 41, AST/ALT 27/13, anion gap 15.    [facility] consulted for medical management of type B aortic dissection. Will admit to the MICU for further management.    Hospital/ICU Course:  07/09/25: repeat CTA of abd/pelvis performed due to abdominal pain. No change in imaging.  07/10/25: Noted to have urinary retention. In and out with 800ml output. Abd pain better. Wants to increase activity. Vascular requesting blood cultures  07/11/25:  Decreasing amounts of esmolol.    Interval History/Significant Events: c/o abd pain. Vas is discussing possible operative intervention next week    Review of Systems   Respiratory:  Negative for chest tightness and wheezing.    Gastrointestinal:  Negative for abdominal pain, constipation, diarrhea and rectal pain.   Genitourinary:  Negative for flank pain, pelvic pain and vaginal pain.   Musculoskeletal:  Negative for back pain.     Objective:     Vital Signs (Most Recent):  Temp: 98 °F (36.7 °C) (07/11/25 1500)  Pulse: 88 (07/11/25 1600)  Resp: 20 (07/11/25 1648)  BP: 137/66 (07/11/25 1600)  SpO2: 100 % (07/11/25 1600) Vital Signs (24h Range):  Temp:  [97.3 °F (36.3 °C)-98.4 °F (36.9 °C)] 98 °F (36.7 °C)  Pulse:  [71-90] 88  Resp:  [14-47] 20  SpO2:  [92 %-100 %] 100 %  BP: ()/() 137/66   Weight: 77.6 kg (171 lb 1.2 oz)  Body mass index is 27.61 kg/m².      Intake/Output Summary (Last 24 hours) at 7/11/2025 1710  Last data filed at 7/11/2025 1604  Gross per 24 hour   Intake 2420.9 ml   Output 600 ml   Net 1820.9 ml          Physical Exam  Vitals and nursing note reviewed.   Constitutional:       General: She is not in acute distress.     Appearance: She is well-developed. She is obese. She is not ill-appearing, toxic-appearing or diaphoretic.   HENT:      Head: Normocephalic and atraumatic.      Comments: NGT in place     Right Ear: External ear normal.      Left Ear: External ear normal.      Nose: Nose normal.      Mouth/Throat:      Mouth: Mucous membranes are moist.   Eyes:      Conjunctiva/sclera: Conjunctivae normal.      Pupils: Pupils are equal, round, and reactive to light.   Cardiovascular:      Rate and Rhythm: Normal rate and regular rhythm.      Pulses: Normal pulses.   Pulmonary:      Effort: Pulmonary effort is normal.   Abdominal:      General: Bowel sounds are normal. There is no distension.      Palpations: Abdomen is soft.      Tenderness: There is no abdominal tenderness. There is  no guarding or rebound.   Musculoskeletal:         General: No swelling.   Skin:     General: Skin is warm and dry.   Neurological:      Mental Status: She is alert.            Vents:     Lines/Drains/Airways       Drain  Duration                  NG/OG Tube 07/08/25 0919 Right nostril 3 days              Peripheral Intravenous Line  Duration             Peripheral IV Single Lumen 07/11/25 1100 18 G 1 3/4 in Anterior;Distal;Left Upper Arm <1 day    Peripheral IV Single Lumen 07/11/25 1600 20 G 1 3/4 in Anterior;Left Forearm <1 day                  Significant Labs:    CBC/Anemia Profile:  Recent Labs   Lab 07/10/25  0409 07/11/25  0405   WBC 14.37* 12.69   HGB 12.9 12.6   HCT 39.4 37.8    285   MCV 89 88   RDW 14.2 13.5        Chemistries:  Recent Labs   Lab 07/10/25  0409 07/11/25  0405    139   K 3.3* 3.4*   * 110   CO2 20* 19*   BUN 9 11   CREATININE 0.8 0.7   CALCIUM 9.4 9.6   ALBUMIN 4.0 3.8   PROT 7.5 6.9   BILITOT 0.7 0.7   ALKPHOS 43 40   ALT 26 20   AST 34 23   MG 2.3 1.8   PHOS 3.3 2.2*       All pertinent labs within the past 24 hours have been reviewed.    Significant Imaging:  I have reviewed all pertinent imaging results/findings within the past 24 hours.    ABG  Recent Labs   Lab 07/09/25  0056   PH 7.349*   PO2 49   PCO2 37.0   HCO3 20.4*   BE -5*     Assessment/Plan:     Cardiac/Vascular  * Aortic dissection  60-year-old female with PMHx of HTN, HLD, and neuropathy, who presents for aortic dissection type B with hypertensive emergency.    CTA: c/w  focal dissection along the right posterior margin of the aorta that extends for a length of approximately 2cm. Vascular Surgery initially consulted and suspects her dissection is subacute or chronic; is not a surgical candidate.    Admitted to the MICU for medical management of AAA dissection.    - Esmolol gtt; titrate to maintain HR < 80 and SBP < 140  - PO lopressor and amlodipine  - Titrate Cardene gtt if BP becomes elevated  - PRN  pain medications  - PRN anti-emetics  - Bentyl IM for suspected gas pain (noted on CTA A/P)    HTN (hypertension)  Takes amlodipine and metoprolol at baseline.  - Continue home medications. Increase B-blocker  - Telemetry    HLD (hyperlipidemia)  Takes fenofibrate and ezetimibe at baseline.  - Continue home medications    Renal/  Urinary retention  -- likley from opiates/bentyl  - bladder scan q shift  - PRN in and out catheter    Hypokalemia  - Replace PRN to maintain >4    GI  Abdominal pain  Multifactorial  CTA without evidence of bowel ischemia    Nausea & vomiting  Intermittent nausea, vomiting, and abd pain throughout stay. CTA A/P with gaseous distention. KUB with moderate stool burden.    -  on most recent EKG  - Antiemetics PRN; Zofran, phenergan, and droperidol given  - Bentyl IM x1 for suspected gas pain  - NGT placed for decompression in the setting of nausea with vomiting and inability to tolerate PO  - PRN EKG for QTC monitoring  - Strict I/Os  - Serial abd exams  - Bowel reg    Duodenitis  - Dilaudid PRN for pain  - PPI daily  - Bowel regemine  - Bentyl    Critical Care Time: 35 minutes  Critical secondary to Patient is currently on drug therapy requiring intensive monitoring for toxicity: Esmolol gtt      Critical care was time spent personally by me on the following activities: development of treatment plan with patient or surrogate and bedside caregivers, discussions with consultants, evaluation of patient's response to treatment, examination of patient, ordering and performing treatments and interventions, ordering and review of laboratory studies, ordering and review of radiographic studies, pulse oximetry, re-evaluation of patient's condition. This critical care time did not overlap with that of any other provider or involve time for any procedures.     Chacorta Whittaker MD  Critical Care Medicine  [facility] - Medical ICU

## 2025-07-11 NOTE — PROGRESS NOTES
[facility] - Medical [facility]  Vascular Surgery  Progress Note    Patient Name: Shelbie Santamaria  MRN: [id number]  Admission Date: 7/7/2025  Primary Care Provider: Sunny Alexandre NP    Subjective:     Interval History: Pt has persistent nausea with only minimal output per NG tube. She reports passing some gas but denies a bowel movement overnight. She reports abdominal pain as well, improved with medication.    Post-Op Info:  Procedure(s) (LRB):  REPAIR, ANEURYSM, AORTA, ENDOVASCULAR (N/A)         Medications:  Continuous Infusions:   esmolol  0-300 mcg/kg/min Intravenous Continuous 47.6 mL/hr at 07/11/25 0758 200 mcg/kg/min at 07/11/25 0758     Scheduled Meds:   amLODIPine  10 mg Oral Daily    aspirin  81 mg Oral Daily    DULoxetine  60 mg Oral Daily    enoxparin  40 mg Subcutaneous Q24H (prophylaxis, 1700)    ezetimibe  10 mg Oral Nightly    fenofibrate  160 mg Oral Daily    magnesium sulfate 2 g IVPB  2 g Intravenous Once    metoprolol tartrate  100 mg Oral BID    mupirocin   Nasal BID    pantoprazole  40 mg Intravenous Daily    potassium chloride  10 mEq Intravenous Q1H     PRN Meds:  Current Facility-Administered Medications:     calcium gluconate IVPB, 1 g, Intravenous, PRN    calcium gluconate IVPB, 2 g, Intravenous, PRN    calcium gluconate IVPB, 3 g, Intravenous, PRN    dextrose 50%, 12.5 g, Intravenous, PRN    dicyclomine, 10 mg, Oral, QID PRN    glucagon (human recombinant), 1 mg, Intramuscular, PRN    hydrALAZINE, 20 mg, Intravenous, Q4H PRN    HYDROmorphone, 1 mg, Intravenous, Q3H PRN    insulin aspart U-100, 0-5 Units, Subcutaneous, Q6H PRN    labetalol, 10 mg, Intravenous, Q4H PRN    magnesium sulfate 2 g IVPB, 2 g, Intravenous, PRN    magnesium sulfate 2 g IVPB, 2 g, Intravenous, PRN    metoclopramide, 10 mg, Intravenous, Q6H PRN    ondansetron, 4 mg, Intravenous, Q6H PRN    polyethylene glycol, 17 g, Oral, Daily PRN    potassium chloride, 40 mEq, Intravenous, PRN **AND** potassium chloride, 60 mEq,  Intravenous, PRN **AND** potassium chloride, 80 mEq, Intravenous, PRN    senna-docusate, 1 tablet, Oral, Daily PRN    sodium chloride 0.9%, 10 mL, Intravenous, PRN    sodium phosphate 15 mmol in D5W 250 mL IVPB, 15 mmol, Intravenous, PRN    sodium phosphate 20.1 mmol in D5W 250 mL IVPB, 20.1 mmol, Intravenous, PRN    sodium phosphate 30 mmol in D5W 250 mL IVPB, 30 mmol, Intravenous, PRN     Objective:     Vital Signs (Most Recent):  Temp: 97.3 °F (36.3 °C) (07/11/25 0700)  Pulse: 72 (07/11/25 0802)  Resp: (!) 21 (07/11/25 0800)  BP: (!) 159/74 (07/11/25 0800)  SpO2: 100 % (07/11/25 0800) Vital Signs (24h Range):  Temp:  [97.3 °F (36.3 °C)-98.5 °F (36.9 °C)] 97.3 °F (36.3 °C)  Pulse:  [71-90] 72  Resp:  [12-47] 21  SpO2:  [92 %-100 %] 100 %  BP: ()/() 159/74     Date 07/11/25 0700 - 07/12/25 0659   Shift 0380-3220 3534-7245 1203-4877 24 Hour Total   INTAKE   I.V.(mL/kg) 77(1)   77(1)   IV Piggyback 40.3   40.3   Shift Total(mL/kg) 117.3(1.5)   117.3(1.5)   OUTPUT   Shift Total(mL/kg)       Weight (kg) 77.6 77.6 77.6 77.6        Physical Exam  HENT:      Head: Normocephalic and atraumatic.      Nose:      Comments: NGT in place with minimal output  Eyes:      Extraocular Movements: Extraocular movements intact.   Pulmonary:      Effort: Pulmonary effort is normal.   Abdominal:      General: There is no distension.      Palpations: Abdomen is soft. There is no mass.      Tenderness: There is no guarding or rebound.      Comments: Mild abdominal tenderness on exam   Skin:     General: Skin is warm.   Psychiatric:      Comments: Pt seems mildly confused.       Significant Labs:  All pertinent labs from the last 24 hours have been reviewed.    Significant Diagnostics:  I have reviewed and interpreted all pertinent imaging results/findings within the past 24 hours.  Assessment/Plan:     * Aortic dissection  60F with likely incidental discovery of short segment paravisceral aortic dissection w/o malperfusion  with concomitant massive gastric distention, antral and duodenal inflammation in patient with long standing GI/enteral complaints and new foregut symptoms.     - Abdominal pain is atypical for aortic dissection presentation. Would request GI to evaluate further for GI pain.  - Suspect her severe gastric distension is cause of pain, not her aortic dissection which may be chronic.  - NG tube in place for decompression, although minimal output thus far.  - recommend BP goal <140, HR <80, on continuous esmolol  - recommend ASA  - Blood and Urine Cx ordered, WBC 12 today  - gentle IVFs  - DVT Ppx  - PPI    Plan: PMEG on Monday 7/14    HTN (hypertension)  - amlodipine   - metoprolol    HLD (hyperlipidemia)  - statin    Garcia Ruiz MD  Vascular Surgery  [facility] - Medical ICU

## 2025-07-12 LAB
ABSOLUTE EOSINOPHIL (OHS): 0.06 K/UL
ABSOLUTE MONOCYTE (OHS): 1.2 K/UL (ref 0.3–1)
ABSOLUTE NEUTROPHIL COUNT (OHS): 7.92 K/UL (ref 1.8–7.7)
ALBUMIN SERPL BCP-MCNC: 3.4 G/DL (ref 3.5–5.2)
ALP SERPL-CCNC: 36 UNIT/L (ref 40–150)
ALT SERPL W/O P-5'-P-CCNC: 19 UNIT/L (ref 10–44)
ANION GAP (OHS): 7 MMOL/L (ref 8–16)
AST SERPL-CCNC: 23 UNIT/L (ref 11–45)
BASOPHILS # BLD AUTO: 0.02 K/UL
BASOPHILS NFR BLD AUTO: 0.2 %
BILIRUB SERPL-MCNC: 0.7 MG/DL (ref 0.1–1)
BUN SERPL-MCNC: 10 MG/DL (ref 8–23)
CALCIUM SERPL-MCNC: 9 MG/DL (ref 8.7–10.5)
CHLORIDE SERPL-SCNC: 110 MMOL/L (ref 95–110)
CO2 SERPL-SCNC: 18 MMOL/L (ref 23–29)
CREAT SERPL-MCNC: 0.6 MG/DL (ref 0.5–1.4)
ERYTHROCYTE [DISTWIDTH] IN BLOOD BY AUTOMATED COUNT: 13.5 % (ref 11.5–14.5)
GFR SERPLBLD CREATININE-BSD FMLA CKD-EPI: >60 ML/MIN/1.73/M2
GLUCOSE SERPL-MCNC: 78 MG/DL (ref 70–110)
HCT VFR BLD AUTO: 39.5 % (ref 37–48.5)
HGB BLD-MCNC: 13.2 GM/DL (ref 12–16)
IMM GRANULOCYTES # BLD AUTO: 0.03 K/UL (ref 0–0.04)
IMM GRANULOCYTES NFR BLD AUTO: 0.2 % (ref 0–0.5)
LYMPHOCYTES # BLD AUTO: 3.07 K/UL (ref 1–4.8)
MAGNESIUM SERPL-MCNC: 1.9 MG/DL (ref 1.6–2.6)
MCH RBC QN AUTO: 29.2 PG (ref 27–31)
MCHC RBC AUTO-ENTMCNC: 33.4 G/DL (ref 32–36)
MCV RBC AUTO: 87 FL (ref 82–98)
NUCLEATED RBC (/100WBC) (OHS): 0 /100 WBC
PHOSPHATE SERPL-MCNC: 3.6 MG/DL (ref 2.7–4.5)
PLATELET # BLD AUTO: 309 K/UL (ref 150–450)
PMV BLD AUTO: 9.9 FL (ref 9.2–12.9)
POTASSIUM SERPL-SCNC: 4.3 MMOL/L (ref 3.5–5.1)
PROT SERPL-MCNC: 6.4 GM/DL (ref 6–8.4)
RBC # BLD AUTO: 4.52 M/UL (ref 4–5.4)
RELATIVE EOSINOPHIL (OHS): 0.5 %
RELATIVE LYMPHOCYTE (OHS): 25 % (ref 18–48)
RELATIVE MONOCYTE (OHS): 9.8 % (ref 4–15)
RELATIVE NEUTROPHIL (OHS): 64.3 % (ref 38–73)
SODIUM SERPL-SCNC: 135 MMOL/L (ref 136–145)
WBC # BLD AUTO: 12.3 K/UL (ref 3.9–12.7)

## 2025-07-12 PROCEDURE — 25000003 PHARM REV CODE 250: Performed by: STUDENT IN AN ORGANIZED HEALTH CARE EDUCATION/TRAINING PROGRAM

## 2025-07-12 PROCEDURE — 25000003 PHARM REV CODE 250

## 2025-07-12 PROCEDURE — 84100 ASSAY OF PHOSPHORUS: CPT

## 2025-07-12 PROCEDURE — 84460 ALANINE AMINO (ALT) (SGPT): CPT

## 2025-07-12 PROCEDURE — 25000003 PHARM REV CODE 250: Performed by: GENERAL ACUTE CARE HOSPITAL

## 2025-07-12 PROCEDURE — 63600175 PHARM REV CODE 636 W HCPCS

## 2025-07-12 PROCEDURE — 25000003 PHARM REV CODE 250: Performed by: INTERNAL MEDICINE

## 2025-07-12 PROCEDURE — 94761 N-INVAS EAR/PLS OXIMETRY MLT: CPT

## 2025-07-12 PROCEDURE — 25000242 PHARM REV CODE 250 ALT 637 W/ HCPCS

## 2025-07-12 PROCEDURE — 20000000 HC ICU ROOM

## 2025-07-12 PROCEDURE — 99233 SBSQ HOSP IP/OBS HIGH 50: CPT | Mod: ,,, | Performed by: INTERNAL MEDICINE

## 2025-07-12 PROCEDURE — 83735 ASSAY OF MAGNESIUM: CPT

## 2025-07-12 PROCEDURE — 85025 COMPLETE CBC W/AUTO DIFF WBC: CPT

## 2025-07-12 RX ORDER — LORAZEPAM 1 MG/1
1 TABLET ORAL ONCE
Status: COMPLETED | OUTPATIENT
Start: 2025-07-12 | End: 2025-07-12

## 2025-07-12 RX ADMIN — ESMOLOL HYDROCHLORIDE 300 MCG/KG/MIN: 20 INJECTION INTRAVENOUS at 01:07

## 2025-07-12 RX ADMIN — HYDROMORPHONE HYDROCHLORIDE 1 MG: 1 INJECTION, SOLUTION INTRAMUSCULAR; INTRAVENOUS; SUBCUTANEOUS at 02:07

## 2025-07-12 RX ADMIN — ESMOLOL HYDROCHLORIDE 300 MCG/KG/MIN: 20 INJECTION INTRAVENOUS at 05:07

## 2025-07-12 RX ADMIN — ESMOLOL HYDROCHLORIDE 300 MCG/KG/MIN: 20 INJECTION INTRAVENOUS at 02:07

## 2025-07-12 RX ADMIN — HYDROMORPHONE HYDROCHLORIDE 1 MG: 1 INJECTION, SOLUTION INTRAMUSCULAR; INTRAVENOUS; SUBCUTANEOUS at 09:07

## 2025-07-12 RX ADMIN — LABETALOL HYDROCHLORIDE 10 MG: 5 INJECTION, SOLUTION INTRAVENOUS at 07:07

## 2025-07-12 RX ADMIN — METOPROLOL TARTRATE 150 MG: 50 TABLET, FILM COATED ORAL at 08:07

## 2025-07-12 RX ADMIN — DICYCLOMINE HYDROCHLORIDE 10 MG: 10 CAPSULE ORAL at 08:07

## 2025-07-12 RX ADMIN — ASPIRIN 81 MG CHEWABLE TABLET 81 MG: 81 TABLET CHEWABLE at 08:07

## 2025-07-12 RX ADMIN — PANTOPRAZOLE SODIUM 40 MG: 40 INJECTION, POWDER, LYOPHILIZED, FOR SOLUTION INTRAVENOUS at 08:07

## 2025-07-12 RX ADMIN — DULOXETINE 60 MG: 60 CAPSULE, DELAYED RELEASE ORAL at 08:07

## 2025-07-12 RX ADMIN — ONDANSETRON 4 MG: 2 INJECTION INTRAMUSCULAR; INTRAVENOUS at 11:07

## 2025-07-12 RX ADMIN — FENOFIBRATE 160 MG: 160 TABLET ORAL at 08:07

## 2025-07-12 RX ADMIN — HYDROMORPHONE HYDROCHLORIDE 1 MG: 1 INJECTION, SOLUTION INTRAMUSCULAR; INTRAVENOUS; SUBCUTANEOUS at 07:07

## 2025-07-12 RX ADMIN — ESMOLOL HYDROCHLORIDE 300 MCG/KG/MIN: 20 INJECTION INTRAVENOUS at 08:07

## 2025-07-12 RX ADMIN — HYDROMORPHONE HYDROCHLORIDE 1 MG: 1 INJECTION, SOLUTION INTRAMUSCULAR; INTRAVENOUS; SUBCUTANEOUS at 06:07

## 2025-07-12 RX ADMIN — ENOXAPARIN SODIUM 40 MG: 40 INJECTION SUBCUTANEOUS at 04:07

## 2025-07-12 RX ADMIN — HYDROMORPHONE HYDROCHLORIDE 1 MG: 1 INJECTION, SOLUTION INTRAMUSCULAR; INTRAVENOUS; SUBCUTANEOUS at 11:07

## 2025-07-12 RX ADMIN — MUPIROCIN: 20 OINTMENT TOPICAL at 08:07

## 2025-07-12 RX ADMIN — ESMOLOL HYDROCHLORIDE 125 MCG/KG/MIN: 20 INJECTION INTRAVENOUS at 10:07

## 2025-07-12 RX ADMIN — AMLODIPINE BESYLATE 10 MG: 10 TABLET ORAL at 08:07

## 2025-07-12 RX ADMIN — ESMOLOL HYDROCHLORIDE 300 MCG/KG/MIN: 20 INJECTION INTRAVENOUS at 03:07

## 2025-07-12 RX ADMIN — LORAZEPAM 1 MG: 1 TABLET ORAL at 11:07

## 2025-07-12 RX ADMIN — EZETIMIBE 10 MG: 10 TABLET ORAL at 08:07

## 2025-07-12 RX ADMIN — HYDROMORPHONE HYDROCHLORIDE 1 MG: 1 INJECTION, SOLUTION INTRAMUSCULAR; INTRAVENOUS; SUBCUTANEOUS at 03:07

## 2025-07-12 RX ADMIN — ESMOLOL HYDROCHLORIDE 300 MCG/KG/MIN: 20 INJECTION INTRAVENOUS at 06:07

## 2025-07-12 NOTE — EICU
Virtual ICU Quality Rounds    Admit Date: 7/7/2025  Hospital Day: 5    ICU Day: 4d 9h    24H Vital Sign Range:  Temp:  [97.5 °F (36.4 °C)-98.4 °F (36.9 °C)]   Pulse:  [67-90]   Resp:  [14-36]   BP: ()/(52-82)   SpO2:  [97 %-100 %]     VICU Surveillance Screening  LDA reconciliation : Yes

## 2025-07-12 NOTE — PLAN OF CARE
MICU DAILY GOALS     Family/Goals of care/Code Status   Code Status: Full Code    24H Vital Sign Range  Temp:  [97.3 °F (36.3 °C)-98.4 °F (36.9 °C)]   Pulse:  [69-90]   Resp:  [14-47]   BP: ()/()   SpO2:  [96 %-100 %]      Shift Events (include procedures and significant events)   No acute events throughout shift    AWAKE RASS: Goal -    Actual - RASS (Worley Agitation-Sedation Scale): alert and calm    Restraint necessity: Not necessary   BREATHE SBT: Not intubated    Coordinate A & B, analgesics/sedatives Pain: managed   SAT: Not intubated   Delirium CAM-ICU:     Early(intubated/ Progressive (non-intubated) Mobility MOVE Screen (INTUBATED ONLY): Not intubated    Activity: Activity Management: Walk with assistive devise and /or staff member - L3   Feeding/Nutrition Diet order: Diet/Nutrition Received: NPO, ice chips,     Thrombus DVT prophylaxis: VTE Core Measure: Pharmacological prophylaxis initiated/maintained   HOB Elevation Head of Bed (HOB) Positioning: HOB elevated   Ulcer Prophylaxis GI: yes   Glucose control managed     Skin Skin assessment:     Sacrum intact/not altered? Yes  Heels intact/not altered? Yes  Surgical wound? No    CHECK ONE!   (no altered skin or altered skin) and sub boxes:  [x] No Altered Skin Integrity Present    [x]Prevention Measures Documented    [] Altered Skin Integrity Present or Discovered   [] LDA already present in EPIC, daily doc completed              [] LDA added if not already in EPIC (describe/stage wound).               [] Wound Image Taken (required on admit,                   transfer/discharge and every Tuesday)    Wound Care Consulted? No   Bowel Function no issues    Indwelling Catheter Necessity       De-escalation Antibiotics No        VS and assessment per flow sheet, patient progressing towards goals as tolerated, plan of care reviewed with pt and , all concerns addressed, will continue w/ POC.

## 2025-07-12 NOTE — SUBJECTIVE & OBJECTIVE
Medications:  Continuous Infusions:   esmolol  0-300 mcg/kg/min Intravenous Continuous 35.7 mL/hr at 07/12/25 0924 150 mcg/kg/min at 07/12/25 0924     Scheduled Meds:   amLODIPine  10 mg Oral Daily    aspirin  81 mg Oral Daily    DULoxetine  60 mg Oral Daily    enoxparin  40 mg Subcutaneous Q24H (prophylaxis, 1700)    ezetimibe  10 mg Oral Nightly    fenofibrate  160 mg Oral Daily    magnesium sulfate 2 g IVPB  2 g Intravenous Once    metoprolol tartrate  150 mg Oral BID    mupirocin   Nasal BID    pantoprazole  40 mg Intravenous Daily     PRN Meds:  Current Facility-Administered Medications:     calcium gluconate IVPB, 1 g, Intravenous, PRN    calcium gluconate IVPB, 2 g, Intravenous, PRN    calcium gluconate IVPB, 3 g, Intravenous, PRN    dextrose 50%, 12.5 g, Intravenous, PRN    dicyclomine, 10 mg, Oral, QID PRN    glucagon (human recombinant), 1 mg, Intramuscular, PRN    hydrALAZINE, 20 mg, Intravenous, Q4H PRN    HYDROmorphone, 1 mg, Intravenous, Q3H PRN    insulin aspart U-100, 0-5 Units, Subcutaneous, Q6H PRN    labetalol, 10 mg, Intravenous, Q4H PRN    magnesium sulfate 2 g IVPB, 2 g, Intravenous, PRN    magnesium sulfate 2 g IVPB, 2 g, Intravenous, PRN    metoclopramide, 10 mg, Intravenous, Q6H PRN    ondansetron, 4 mg, Intravenous, Q6H PRN    polyethylene glycol, 17 g, Oral, Daily PRN    potassium chloride, 40 mEq, Intravenous, PRN **AND** potassium chloride, 60 mEq, Intravenous, PRN **AND** potassium chloride, 80 mEq, Intravenous, PRN    senna-docusate, 1 tablet, Oral, Daily PRN    sodium chloride 0.9%, 10 mL, Intravenous, PRN    sodium phosphate 15 mmol in D5W 250 mL IVPB, 15 mmol, Intravenous, PRN    sodium phosphate 20.1 mmol in D5W 250 mL IVPB, 20.1 mmol, Intravenous, PRN    sodium phosphate 30 mmol in D5W 250 mL IVPB, 30 mmol, Intravenous, PRN     Objective:     Vital Signs (Most Recent):  Temp: 97.6 °F (36.4 °C) (07/12/25 0800)  Pulse: 67 (07/12/25 0942)  Resp: (!) 21 (07/12/25 0930)  BP: 102/63  (07/12/25 0930)  SpO2: 99 % (07/12/25 0930) Vital Signs (24h Range):  Temp:  [97.5 °F (36.4 °C)-98.4 °F (36.9 °C)] 97.6 °F (36.4 °C)  Pulse:  [67-89] 67  Resp:  [14-30] 21  SpO2:  [97 %-100 %] 99 %  BP: ()/(52-82) 102/63     Date 07/12/25 0700 - 07/13/25 0659   Shift 7432-5860 0673-6201 0319-5854 24 Hour Total   INTAKE   I.V.(mL/kg) 221.7(2.9)   221.7(2.9)   NG/GT 85   85   Shift Total(mL/kg) 306.7(4)   306.7(4)   OUTPUT   Shift Total(mL/kg)       Weight (kg) 77.6 77.6 77.6 77.6        Physical Exam  HENT:      Head: Normocephalic and atraumatic.      Nose:      Comments: NGT in place, clamped  Eyes:      Extraocular Movements: Extraocular movements intact.   Pulmonary:      Effort: Pulmonary effort is normal.   Abdominal:      General: There is no distension.      Palpations: Abdomen is soft. There is no mass.      Tenderness: There is no abdominal tenderness. There is no guarding or rebound.      Comments: No abdominal tenderness on exam   Skin:     General: Skin is warm.          Significant Labs:  All pertinent labs from the last 24 hours have been reviewed.    Significant Diagnostics:  I have reviewed all pertinent imaging results/findings within the past 24 hours.

## 2025-07-12 NOTE — EICU
Intervention Initiated From:  COR / YANNIU    Yandel intervened regarding:  Rounding (Video assessment)    VICU Night Rounds Checklist  24H Vital Sign Range:  Temp:  [97.3 °F (36.3 °C)-98.4 °F (36.9 °C)]   Pulse:  [69-90]   Resp:  [16-47]   BP: ()/()   SpO2:  [98 %-100 %]     Video rounds and LDA reconciliation

## 2025-07-12 NOTE — ASSESSMENT & PLAN NOTE
60F with likely incidental discovery of short segment paravisceral aortic dissection w/o malperfusion with concomitant massive gastric distention, antral and duodenal inflammation in patient with long standing GI/enteral complaints and new foregut symptoms.     - NG tube discontinued, ok to trial clears  - recommend BP goal <140, HR <80, on continuous esmolol  - recommend ASA  - Consent obtained  - DVT Ppx  - PPI    Plan: PMEG on Monday 7/14

## 2025-07-12 NOTE — PROGRESS NOTES
[facility]  Vascular Surgery  Progress Note    Patient Name: Shelbie Santamaria  MRN: [id number]  Admission Date: 7/7/2025  Primary Care Provider: Sunny Alexandre NP    Subjective:     Interval History:   STEFANO  Denies abdominal pain this morning  No nausea or vomiting  NG tube not removed yesterday, clamped this morning    Post-Op Info:  Procedure(s) (LRB):  REPAIR, ANEURYSM, AORTA, ENDOVASCULAR (N/A)         Medications:  Continuous Infusions:   esmolol  0-300 mcg/kg/min Intravenous Continuous 35.7 mL/hr at 07/12/25 0924 150 mcg/kg/min at 07/12/25 0924     Scheduled Meds:   amLODIPine  10 mg Oral Daily    aspirin  81 mg Oral Daily    DULoxetine  60 mg Oral Daily    enoxparin  40 mg Subcutaneous Q24H (prophylaxis, 1700)    ezetimibe  10 mg Oral Nightly    fenofibrate  160 mg Oral Daily    magnesium sulfate 2 g IVPB  2 g Intravenous Once    metoprolol tartrate  150 mg Oral BID    mupirocin   Nasal BID    pantoprazole  40 mg Intravenous Daily     PRN Meds:  Current Facility-Administered Medications:     calcium gluconate IVPB, 1 g, Intravenous, PRN    calcium gluconate IVPB, 2 g, Intravenous, PRN    calcium gluconate IVPB, 3 g, Intravenous, PRN    dextrose 50%, 12.5 g, Intravenous, PRN    dicyclomine, 10 mg, Oral, QID PRN    glucagon (human recombinant), 1 mg, Intramuscular, PRN    hydrALAZINE, 20 mg, Intravenous, Q4H PRN    HYDROmorphone, 1 mg, Intravenous, Q3H PRN    insulin aspart U-100, 0-5 Units, Subcutaneous, Q6H PRN    labetalol, 10 mg, Intravenous, Q4H PRN    magnesium sulfate 2 g IVPB, 2 g, Intravenous, PRN    magnesium sulfate 2 g IVPB, 2 g, Intravenous, PRN    metoclopramide, 10 mg, Intravenous, Q6H PRN    ondansetron, 4 mg, Intravenous, Q6H PRN    polyethylene glycol, 17 g, Oral, Daily PRN    potassium chloride, 40 mEq, Intravenous, PRN **AND** potassium chloride, 60 mEq, Intravenous, PRN **AND** potassium chloride, 80 mEq, Intravenous, PRN    senna-docusate, 1 tablet, Oral, Daily PRN     sodium chloride 0.9%, 10 mL, Intravenous, PRN    sodium phosphate 15 mmol in D5W 250 mL IVPB, 15 mmol, Intravenous, PRN    sodium phosphate 20.1 mmol in D5W 250 mL IVPB, 20.1 mmol, Intravenous, PRN    sodium phosphate 30 mmol in D5W 250 mL IVPB, 30 mmol, Intravenous, PRN     Objective:     Vital Signs (Most Recent):  Temp: 97.6 °F (36.4 °C) (07/12/25 0800)  Pulse: 67 (07/12/25 0942)  Resp: (!) 21 (07/12/25 0930)  BP: 102/63 (07/12/25 0930)  SpO2: 99 % (07/12/25 0930) Vital Signs (24h Range):  Temp:  [97.5 °F (36.4 °C)-98.4 °F (36.9 °C)] 97.6 °F (36.4 °C)  Pulse:  [67-89] 67  Resp:  [14-30] 21  SpO2:  [97 %-100 %] 99 %  BP: ()/(52-82) 102/63     Date 07/12/25 0700 - 07/13/25 0659   Shift 0152-3830 1266-8144 8170-4854 24 Hour Total   INTAKE   I.V.(mL/kg) 221.7(2.9)   221.7(2.9)   NG/GT 85   85   Shift Total(mL/kg) 306.7(4)   306.7(4)   OUTPUT   Shift Total(mL/kg)       Weight (kg) 77.6 77.6 77.6 77.6        Physical Exam  HENT:      Head: Normocephalic and atraumatic.      Nose:      Comments: NGT in place, clamped  Eyes:      Extraocular Movements: Extraocular movements intact.   Pulmonary:      Effort: Pulmonary effort is normal.   Abdominal:      General: There is no distension.      Palpations: Abdomen is soft. There is no mass.      Tenderness: There is no abdominal tenderness. There is no guarding or rebound.      Comments: No abdominal tenderness on exam   Skin:     General: Skin is warm.          Significant Labs:  All pertinent labs from the last 24 hours have been reviewed.    Significant Diagnostics:  I have reviewed all pertinent imaging results/findings within the past 24 hours.  Assessment/Plan:     * Aortic dissection  60F with likely incidental discovery of short segment paravisceral aortic dissection w/o malperfusion with concomitant massive gastric distention, antral and duodenal inflammation in patient with long standing GI/enteral complaints and new foregut symptoms.     - NG tube  discontinued, ok to trial clears  - recommend BP goal <140, HR <80, on continuous esmolol  - recommend ASA  - Consent obtained  - DVT Ppx  - PPI    Plan: PMEG on Monday 7/14      Kvng Corral MD  Vascular Surgery  [facility] - Medical ICU

## 2025-07-13 LAB
ABSOLUTE EOSINOPHIL (OHS): 0.15 K/UL
ABSOLUTE MONOCYTE (OHS): 1.04 K/UL (ref 0.3–1)
ABSOLUTE NEUTROPHIL COUNT (OHS): 5.74 K/UL (ref 1.8–7.7)
ALBUMIN SERPL BCP-MCNC: 3.1 G/DL (ref 3.5–5.2)
ALP SERPL-CCNC: 32 UNIT/L (ref 40–150)
ALT SERPL W/O P-5'-P-CCNC: 20 UNIT/L (ref 10–44)
ANION GAP (OHS): 9 MMOL/L (ref 8–16)
AST SERPL-CCNC: 32 UNIT/L (ref 11–45)
BASOPHILS # BLD AUTO: 0.02 K/UL
BASOPHILS NFR BLD AUTO: 0.2 %
BILIRUB SERPL-MCNC: 0.5 MG/DL (ref 0.1–1)
BUN SERPL-MCNC: 13 MG/DL (ref 8–23)
CALCIUM SERPL-MCNC: 9.2 MG/DL (ref 8.7–10.5)
CHLORIDE SERPL-SCNC: 110 MMOL/L (ref 95–110)
CO2 SERPL-SCNC: 18 MMOL/L (ref 23–29)
CREAT SERPL-MCNC: 0.6 MG/DL (ref 0.5–1.4)
ERYTHROCYTE [DISTWIDTH] IN BLOOD BY AUTOMATED COUNT: 13.5 % (ref 11.5–14.5)
GFR SERPLBLD CREATININE-BSD FMLA CKD-EPI: >60 ML/MIN/1.73/M2
GLUCOSE SERPL-MCNC: 97 MG/DL (ref 70–110)
HCT VFR BLD AUTO: 35.5 % (ref 37–48.5)
HGB BLD-MCNC: 12.1 GM/DL (ref 12–16)
IMM GRANULOCYTES # BLD AUTO: 0.04 K/UL (ref 0–0.04)
IMM GRANULOCYTES NFR BLD AUTO: 0.4 % (ref 0–0.5)
LYMPHOCYTES # BLD AUTO: 2.88 K/UL (ref 1–4.8)
MAGNESIUM SERPL-MCNC: 1.9 MG/DL (ref 1.6–2.6)
MCH RBC QN AUTO: 29.4 PG (ref 27–31)
MCHC RBC AUTO-ENTMCNC: 34.1 G/DL (ref 32–36)
MCV RBC AUTO: 86 FL (ref 82–98)
NUCLEATED RBC (/100WBC) (OHS): 0 /100 WBC
PHOSPHATE SERPL-MCNC: 4.2 MG/DL (ref 2.7–4.5)
PLATELET # BLD AUTO: 302 K/UL (ref 150–450)
PMV BLD AUTO: 10 FL (ref 9.2–12.9)
POCT GLUCOSE: 87 MG/DL (ref 70–110)
POTASSIUM SERPL-SCNC: 3.6 MMOL/L (ref 3.5–5.1)
PROT SERPL-MCNC: 5.9 GM/DL (ref 6–8.4)
RBC # BLD AUTO: 4.12 M/UL (ref 4–5.4)
RELATIVE EOSINOPHIL (OHS): 1.5 %
RELATIVE LYMPHOCYTE (OHS): 29.2 % (ref 18–48)
RELATIVE MONOCYTE (OHS): 10.5 % (ref 4–15)
RELATIVE NEUTROPHIL (OHS): 58.2 % (ref 38–73)
SODIUM SERPL-SCNC: 137 MMOL/L (ref 136–145)
WBC # BLD AUTO: 9.87 K/UL (ref 3.9–12.7)

## 2025-07-13 PROCEDURE — 63600175 PHARM REV CODE 636 W HCPCS

## 2025-07-13 PROCEDURE — 94761 N-INVAS EAR/PLS OXIMETRY MLT: CPT

## 2025-07-13 PROCEDURE — 25000003 PHARM REV CODE 250: Performed by: GENERAL ACUTE CARE HOSPITAL

## 2025-07-13 PROCEDURE — 99233 SBSQ HOSP IP/OBS HIGH 50: CPT | Mod: ,,, | Performed by: INTERNAL MEDICINE

## 2025-07-13 PROCEDURE — 63600175 PHARM REV CODE 636 W HCPCS: Performed by: GENERAL ACUTE CARE HOSPITAL

## 2025-07-13 PROCEDURE — 20000000 HC ICU ROOM

## 2025-07-13 PROCEDURE — 25000003 PHARM REV CODE 250: Performed by: STUDENT IN AN ORGANIZED HEALTH CARE EDUCATION/TRAINING PROGRAM

## 2025-07-13 PROCEDURE — 25000242 PHARM REV CODE 250 ALT 637 W/ HCPCS

## 2025-07-13 PROCEDURE — 25000003 PHARM REV CODE 250

## 2025-07-13 PROCEDURE — 25000003 PHARM REV CODE 250: Performed by: INTERNAL MEDICINE

## 2025-07-13 PROCEDURE — 83735 ASSAY OF MAGNESIUM: CPT

## 2025-07-13 PROCEDURE — 85025 COMPLETE CBC W/AUTO DIFF WBC: CPT

## 2025-07-13 PROCEDURE — 84100 ASSAY OF PHOSPHORUS: CPT

## 2025-07-13 PROCEDURE — 80053 COMPREHEN METABOLIC PANEL: CPT

## 2025-07-13 RX ORDER — LABETALOL HCL 20 MG/4 ML
10 SYRINGE (ML) INTRAVENOUS
Status: DISCONTINUED | OUTPATIENT
Start: 2025-07-13 | End: 2025-07-15

## 2025-07-13 RX ORDER — LORAZEPAM 0.5 MG/1
0.5 TABLET ORAL ONCE
Status: COMPLETED | OUTPATIENT
Start: 2025-07-13 | End: 2025-07-13

## 2025-07-13 RX ADMIN — HYDROMORPHONE HYDROCHLORIDE 1 MG: 1 INJECTION, SOLUTION INTRAMUSCULAR; INTRAVENOUS; SUBCUTANEOUS at 06:07

## 2025-07-13 RX ADMIN — LABETALOL HYDROCHLORIDE 10 MG: 5 INJECTION, SOLUTION INTRAVENOUS at 03:07

## 2025-07-13 RX ADMIN — MUPIROCIN: 20 OINTMENT TOPICAL at 09:07

## 2025-07-13 RX ADMIN — MUPIROCIN: 20 OINTMENT TOPICAL at 08:07

## 2025-07-13 RX ADMIN — DULOXETINE 60 MG: 60 CAPSULE, DELAYED RELEASE ORAL at 09:07

## 2025-07-13 RX ADMIN — PANTOPRAZOLE SODIUM 40 MG: 40 INJECTION, POWDER, LYOPHILIZED, FOR SOLUTION INTRAVENOUS at 10:07

## 2025-07-13 RX ADMIN — HYDROMORPHONE HYDROCHLORIDE 1 MG: 1 INJECTION, SOLUTION INTRAMUSCULAR; INTRAVENOUS; SUBCUTANEOUS at 04:07

## 2025-07-13 RX ADMIN — LORAZEPAM 0.5 MG: 0.5 TABLET ORAL at 08:07

## 2025-07-13 RX ADMIN — FENOFIBRATE 160 MG: 160 TABLET ORAL at 09:07

## 2025-07-13 RX ADMIN — ASPIRIN 81 MG CHEWABLE TABLET 81 MG: 81 TABLET CHEWABLE at 09:07

## 2025-07-13 RX ADMIN — DICYCLOMINE HYDROCHLORIDE 10 MG: 10 CAPSULE ORAL at 07:07

## 2025-07-13 RX ADMIN — LABETALOL HYDROCHLORIDE 10 MG: 5 INJECTION, SOLUTION INTRAVENOUS at 05:07

## 2025-07-13 RX ADMIN — POTASSIUM CHLORIDE 10 MEQ: 7.46 INJECTION, SOLUTION INTRAVENOUS at 08:07

## 2025-07-13 RX ADMIN — POTASSIUM CHLORIDE 40 MEQ: 7.46 INJECTION, SOLUTION INTRAVENOUS at 03:07

## 2025-07-13 RX ADMIN — HYDROMORPHONE HYDROCHLORIDE 1 MG: 1 INJECTION, SOLUTION INTRAMUSCULAR; INTRAVENOUS; SUBCUTANEOUS at 03:07

## 2025-07-13 RX ADMIN — DICYCLOMINE HYDROCHLORIDE 10 MG: 10 CAPSULE ORAL at 03:07

## 2025-07-13 RX ADMIN — EZETIMIBE 10 MG: 10 TABLET ORAL at 08:07

## 2025-07-13 RX ADMIN — METOPROLOL TARTRATE 150 MG: 50 TABLET, FILM COATED ORAL at 08:07

## 2025-07-13 RX ADMIN — ENOXAPARIN SODIUM 40 MG: 40 INJECTION SUBCUTANEOUS at 04:07

## 2025-07-13 RX ADMIN — AMLODIPINE BESYLATE 10 MG: 10 TABLET ORAL at 09:07

## 2025-07-13 RX ADMIN — METOPROLOL TARTRATE 150 MG: 50 TABLET, FILM COATED ORAL at 09:07

## 2025-07-13 RX ADMIN — HYDROMORPHONE HYDROCHLORIDE 1 MG: 1 INJECTION, SOLUTION INTRAMUSCULAR; INTRAVENOUS; SUBCUTANEOUS at 07:07

## 2025-07-13 NOTE — ASSESSMENT & PLAN NOTE
Intermittent nausea, vomiting, and abd pain throughout stay. CTA A/P with gaseous distention. KUB with moderate stool burden.    - Antiemetics PRN; Zofran, phenergan, and droperidol  - Bentyl IM x1 for suspected gas pain  - NGT placed for decompression in the setting of nausea with vomiting and inability to tolerate PO  - PRN EKG for QTC monitoring  - Strict I/Os  - Serial abd exams  - Bowel reg

## 2025-07-13 NOTE — PT/OT/SLP PROGRESS
Occupational Therapy      Patient Name:  Shelbie Santamaria   MRN:  [id number]    Patient not seen today secondary to  on hold as pt unable to maintain HR <80 per RN and awaiting graft procedure 7/14 . Will follow-up 7/15.    7/13/2025

## 2025-07-13 NOTE — PLAN OF CARE
MICU DAILY GOALS     Family/Goals of care/Code Status   Code Status: Full Code    24H Vital Sign Range  Temp:  [97.5 °F (36.4 °C)-98.8 °F (37.1 °C)]   Pulse:  [67-87]   Resp:  [14-47]   BP: ()/(50-95)   SpO2:  [97 %-100 %]      Shift Events (include procedures and significant events)   Pt HR continues to stay above goal, PRN labetalol administered as per w/ minimal/short response. CCT aware, PRN order modified to q2 hr.     AWAKE RASS: Goal -    Actual - RASS (Worley Agitation-Sedation Scale): alert and calm    Restraint necessity: Not necessary   BREATHE SBT: Not intubated    Coordinate A & B, analgesics/sedatives Pain: managed   SAT: Not intubated   Delirium CAM-ICU:     Early(intubated/ Progressive (non-intubated) Mobility MOVE Screen (INTUBATED ONLY): Not intubated    Activity: Activity Management: Walk with assistive devise and /or staff member - L3   Feeding/Nutrition Diet order: Diet/Nutrition Received: clear liquid,     Thrombus DVT prophylaxis: VTE Core Measure: Pharmacological prophylaxis initiated/maintained   HOB Elevation Head of Bed (HOB) Positioning: HOB elevated   Ulcer Prophylaxis GI: yes   Glucose control managed     Skin Skin assessment:     Sacrum intact/not altered? Yes  Heels intact/not altered? Yes  Surgical wound? No    CHECK ONE!   (no altered skin or altered skin) and sub boxes:  [x] No Altered Skin Integrity Present    [x]Prevention Measures Documented    [] Altered Skin Integrity Present or Discovered   [] LDA already present in EPIC, daily doc completed              [] LDA added if not already in EPIC (describe/stage wound).               [] Wound Image Taken (required on admit,                   transfer/discharge and every Tuesday)    Wound Care Consulted? No   Bowel Function no issues    Indwelling Catheter Necessity       De-escalation Antibiotics No        VS and assessment per flow sheet, patient progressing towards goals as tolerated, plan of care reviewed with pt and  spouse, all concerns addressed, will continue w/ POC.

## 2025-07-13 NOTE — ASSESSMENT & PLAN NOTE
60F with likely incidental discovery of short segment paravisceral aortic dissection w/o malperfusion with concomitant massive gastric distention, antral and duodenal inflammation in patient with long standing GI/enteral complaints and new foregut symptoms.     - Continue clears  - recommend BP goal <140, HR <80  - recommend ASA  - Consent obtained  - DVT Ppx  - PPI    Plan: PMEG on Monday 7/14

## 2025-07-13 NOTE — EICU
Intervention Initiated From:  COR / YANNIU    Yandel intervened regarding:  Rounding (Video assessment)    VICU Night Rounds Checklist  24H Vital Sign Range:  Temp:  [97.5 °F (36.4 °C)-98.8 °F (37.1 °C)]   Pulse:  [67-92]   Resp:  [14-47]   BP: ()/(50-95)   SpO2:  [97 %-100 %]     Video rounds

## 2025-07-13 NOTE — EICU
Virtual ICU Quality Rounds    Admit Date: 7/7/2025  Hospital Day: 6    ICU Day: 5d 16h    24H Vital Sign Range:  Temp:  [98 °F (36.7 °C)-98.8 °F (37.1 °C)]   Pulse:  []   Resp:  [13-36]   BP: (100-130)/(55-69)   SpO2:  [95 %-100 %]     VICU Surveillance Screening                    LDA reconciliation : Yes

## 2025-07-13 NOTE — SUBJECTIVE & OBJECTIVE
Medications:  Continuous Infusions:  Scheduled Meds:   amLODIPine  10 mg Oral Daily    aspirin  81 mg Oral Daily    DULoxetine  60 mg Oral Daily    enoxparin  40 mg Subcutaneous Q24H (prophylaxis, 1700)    ezetimibe  10 mg Oral Nightly    fenofibrate  160 mg Oral Daily    magnesium sulfate 2 g IVPB  2 g Intravenous Once    metoprolol tartrate  150 mg Oral BID    mupirocin   Nasal BID    pantoprazole  40 mg Intravenous Daily     PRN Meds:  Current Facility-Administered Medications:     calcium gluconate IVPB, 1 g, Intravenous, PRN    calcium gluconate IVPB, 2 g, Intravenous, PRN    calcium gluconate IVPB, 3 g, Intravenous, PRN    dextrose 50%, 12.5 g, Intravenous, PRN    dicyclomine, 10 mg, Oral, QID PRN    glucagon (human recombinant), 1 mg, Intramuscular, PRN    hydrALAZINE, 20 mg, Intravenous, Q4H PRN    HYDROmorphone, 1 mg, Intravenous, Q3H PRN    insulin aspart U-100, 0-5 Units, Subcutaneous, Q6H PRN    labetalol, 10 mg, Intravenous, Q2H PRN    magnesium sulfate 2 g IVPB, 2 g, Intravenous, PRN    magnesium sulfate 2 g IVPB, 2 g, Intravenous, PRN    metoclopramide, 10 mg, Intravenous, Q6H PRN    ondansetron, 4 mg, Intravenous, Q6H PRN    polyethylene glycol, 17 g, Oral, Daily PRN    potassium chloride, 40 mEq, Intravenous, PRN **AND** potassium chloride, 60 mEq, Intravenous, PRN **AND** potassium chloride, 80 mEq, Intravenous, PRN    senna-docusate, 1 tablet, Oral, Daily PRN    sodium chloride 0.9%, 10 mL, Intravenous, PRN    sodium phosphate 15 mmol in D5W 250 mL IVPB, 15 mmol, Intravenous, PRN    sodium phosphate 20.1 mmol in D5W 250 mL IVPB, 20.1 mmol, Intravenous, PRN    sodium phosphate 30 mmol in D5W 250 mL IVPB, 30 mmol, Intravenous, PRN     Objective:     Vital Signs (Most Recent):  Temp: 98.1 °F (36.7 °C) (07/13/25 0800)  Pulse: 79 (07/13/25 0900)  Resp: 13 (07/13/25 0900)  BP: (!) 118/58 (07/13/25 0900)  SpO2: 100 % (07/13/25 0900) Vital Signs (24h Range):  Temp:  [97.9 °F (36.6 °C)-98.8 °F (37.1 °C)]  98.1 °F (36.7 °C)  Pulse:  [] 79  Resp:  [13-47] 13  SpO2:  [95 %-100 %] 100 %  BP: ()/(50-95) 118/58          Physical Exam  HENT:      Head: Normocephalic and atraumatic.   Eyes:      Extraocular Movements: Extraocular movements intact.   Pulmonary:      Effort: Pulmonary effort is normal.   Abdominal:      General: There is no distension.      Palpations: Abdomen is soft. There is no mass.      Tenderness: There is no abdominal tenderness. There is no guarding or rebound.      Comments: No abdominal tenderness on exam   Skin:     General: Skin is warm.   Neurological:      General: No focal deficit present.      Mental Status: She is oriented to person, place, and time.          Significant Labs:  All pertinent labs from the last 24 hours have been reviewed.    Significant Diagnostics:  I have reviewed all pertinent imaging results/findings within the past 24 hours.

## 2025-07-13 NOTE — PROGRESS NOTES
[facility] - Medical ICU  Critical Care Medicine  Progress Note    Patient Name: Shelbie Santamaria  MRN: [id number]  Admission Date: 7/7/2025  Hospital Length of Stay: 5 days  Code Status: Full Code  Attending Provider: Chacorta Whittaker*  Primary Care Provider: Sunny Alexandre NP   Principal Problem: Aortic dissection    Subjective:     HPI:  Ms. Santamaria is a 60yoF with PMHx significant for HTN, HLD, and endometriosis who presented to the ED with c/o general malaise, anorexia, and chills x10 days. Per report, went to lunch today and after trying to eat, had an exacerbation of these ongoing symptoms prompting her presentation to an OSH. Imaging was obtained that demonstrated an aortic dissection in her paravisceral segment; chronicity unknown.   She was transferred to [facility] for further evaluation and Vascular Surgery consult. States long-standing history of foregut and GI illnesses. Chief complaint upon exam in our ED was nausea and epigastric pain. Takes aspirin and Zetia at home and is also a former smoker. Denies back pain, chest pain, shortness of breath, or other complaints. Vascular consulted and deemed not surgical candidate; thought that AAA is either subacute or chronic. Pt. Was placed on esmolol gtt and cardene gtt. Hemodynamic goals of SBP <140 and HR <80 per Vascular recs.    While in the ED, labs and imaging significant for WBC 14.18, H/H 12.9/37.9, plts 327, PTT 26.2, Pt 11.9, INR 1.1, Na 137, K 3.8, Cl 107, CO2 15, glucose 124, BUN 16, creatinine 0.7, ALP 41, AST/ALT 27/13, anion gap 15.    [facility] consulted for medical management of type B aortic dissection. Will admit to the MICU for further management.    Hospital/ICU Course:  07/09/25: repeat CTA of abd/pelvis performed due to abdominal pain. No change in imaging.  07/10/25: Noted to have urinary retention. In and out with 800ml output. Abd pain better. Wants to increase activity. Vascular requesting blood cultures  07/11/25:  Decreasing amounts of esmolol.  07/12/25: Continue to titrate esmolol. NGT removed. Advance to clears    Interval History/Significant Events: improved pain control. Feeling better    Review of Systems   Respiratory:  Negative for chest tightness and wheezing.    Gastrointestinal:  Negative for abdominal pain, constipation, diarrhea and rectal pain.   Genitourinary:  Negative for flank pain, pelvic pain and vaginal pain.   Musculoskeletal:  Negative for back pain.     Objective:     Vital Signs (Most Recent):  Temp: 98.8 °F (37.1 °C) (07/12/25 1912)  Pulse: 87 (07/12/25 1912)  Resp: (!) 26 (07/12/25 1912)  BP: 124/62 (07/12/25 1912)  SpO2: 99 % (07/12/25 1912) Vital Signs (24h Range):  Temp:  [97.5 °F (36.4 °C)-98.8 °F (37.1 °C)] 98.8 °F (37.1 °C)  Pulse:  [67-87] 87  Resp:  [14-47] 26  SpO2:  [97 %-100 %] 99 %  BP: ()/(50-95) 124/62   Weight: 77.6 kg (171 lb 1.2 oz)  Body mass index is 27.61 kg/m².      Intake/Output Summary (Last 24 hours) at 7/12/2025 1943  Last data filed at 7/12/2025 1852  Gross per 24 hour   Intake 1869.84 ml   Output 875 ml   Net 994.84 ml          Physical Exam  Vitals and nursing note reviewed.   Constitutional:       General: She is not in acute distress.     Appearance: She is well-developed. She is obese. She is not ill-appearing, toxic-appearing or diaphoretic.   HENT:      Head: Normocephalic and atraumatic.      Comments: NGT in place     Right Ear: External ear normal.      Left Ear: External ear normal.      Nose: Nose normal.      Mouth/Throat:      Mouth: Mucous membranes are moist.   Eyes:      Conjunctiva/sclera: Conjunctivae normal.      Pupils: Pupils are equal, round, and reactive to light.   Cardiovascular:      Rate and Rhythm: Normal rate and regular rhythm.      Pulses: Normal pulses.   Pulmonary:      Effort: Pulmonary effort is normal.   Abdominal:      General: Bowel sounds are normal. There is no distension.      Palpations: Abdomen is soft.      Tenderness:  There is no abdominal tenderness. There is no guarding or rebound.   Musculoskeletal:         General: No swelling.   Skin:     General: Skin is warm and dry.   Neurological:      Mental Status: She is alert.            Vents:     Lines/Drains/Airways       Peripheral Intravenous Line  Duration             Peripheral IV Single Lumen 07/11/25 1600 20 G 1 3/4 in Anterior;Left Forearm 1 day    Peripheral IV 07/12/25 0648 20 G Long PIVC Anterior;Right Forearm <1 day                  Significant Labs:    CBC/Anemia Profile:  Recent Labs   Lab 07/11/25 0405 07/12/25  0431   WBC 12.69 12.30   HGB 12.6 13.2   HCT 37.8 39.5    309   MCV 88 87   RDW 13.5 13.5        Chemistries:  Recent Labs   Lab 07/11/25 0405 07/11/25 2125 07/12/25  0431    136 135*   K 3.4* 3.2* 4.3    109 110   CO2 19* 17* 18*   BUN 11 9 10   CREATININE 0.7 0.6 0.6   CALCIUM 9.6 9.2 9.0   ALBUMIN 3.8 3.4* 3.4*   PROT 6.9 6.4 6.4   BILITOT 0.7 0.7 0.7   ALKPHOS 40 37* 36*   ALT 20 19 19   AST 23 23 23   MG 1.8  --  1.9   PHOS 2.2*  --  3.6       All pertinent labs within the past 24 hours have been reviewed.    Significant Imaging:  I have reviewed all pertinent imaging results/findings within the past 24 hours.    ABG  Recent Labs   Lab 07/09/25  0056   PH 7.349*   PO2 49   PCO2 37.0   HCO3 20.4*   BE -5*     Assessment/Plan:     Cardiac/Vascular  * Aortic dissection  60-year-old female with PMHx of HTN, HLD, and neuropathy, who presents for aortic dissection type B with hypertensive emergency.    CTA: c/w  focal dissection along the right posterior margin of the aorta that extends for a length of approximately 2cm. Vascular Surgery initially consulted and suspects her dissection is subacute or chronic; is not a surgical candidate.    Admitted to the MICU for medical management of AAA dissection.    - Esmolol gtt; titrate to maintain HR < 80 and SBP < 140  - PO lopressor and amlodipine  - add/Titrate Cardene gtt if BP becomes  elevated  - PRN pain medications  - PRN anti-emetics  - Bentyl IM for suspected gas pain (noted on CTA A/P)    HTN (hypertension)  Takes amlodipine and metoprolol at baseline.  - Continue home medications. Increase B-blocker  - Telemetry    HLD (hyperlipidemia)  Takes fenofibrate and ezetimibe at baseline.  - Continue home medications    Renal/  Urinary retention  -- likley from opiates/bentyl  - bladder scan q shift  - PRN in-and-out catheter    Hypokalemia  - Replace PRN to maintain >4    GI  Abdominal pain  Multifactorial  CTA without evidence of bowel ischemia    Nausea & vomiting  Intermittent nausea, vomiting, and abd pain throughout stay. CTA A/P with gaseous distention. KUB with moderate stool burden.    - Antiemetics PRN; Zofran, phenergan, and droperidol  - Bentyl IM x1 for suspected gas pain  - NGT placed for decompression in the setting of nausea with vomiting and inability to tolerate PO  - PRN EKG for QTC monitoring  - Strict I/Os  - Serial abd exams  - Bowel reg    Duodenitis  - Dilaudid PRN for pain  - PPI daily  - Bowel regemine  - Bentyl       Chacorta Whittaker MD  Critical Care Medicine  [facility] - Medical ICU

## 2025-07-13 NOTE — ASSESSMENT & PLAN NOTE
Takes amlodipine and metoprolol at baseline.  - Continue home medications. Increase B-blocker  - Telemetry

## 2025-07-13 NOTE — PT/OT/SLP PROGRESS
Physical Therapy      Patient Name:  Shelbie Santamaria   MRN:  [id number]    Patient not seen today secondary to: nsg recommends hold d/t unable to tolerate sitting up for > 5 minutes d/t pain and trouble maintaining parameters - HR < 80 bpm and SBP > 140 (HR 74 at rest). Anticipated PMEG on Monday 7/14. Will follow up for evaluation after pending medical stability.    7/13/2025

## 2025-07-13 NOTE — NURSING
During handoff, pt noted to maintain HR mid 80s-90s. PRN Labetalol administered per order. Per Vasculat surgery to maintain hemodynamic goals HR <80 and SBP <140. HR still labile reaching high 80s despite PRN administration, See Flowsheet for documentation. MAIA Melara, made aware. HR started to settle within parameters. Per PA, to keep monitoring HR as long as it doesn't sustain above parameters for long periods.

## 2025-07-13 NOTE — SUBJECTIVE & OBJECTIVE
Interval History/Significant Events: improved pain control. Feeling better    Review of Systems   Respiratory:  Negative for chest tightness and wheezing.    Gastrointestinal:  Negative for abdominal pain, constipation, diarrhea and rectal pain.   Genitourinary:  Negative for flank pain, pelvic pain and vaginal pain.   Musculoskeletal:  Negative for back pain.     Objective:     Vital Signs (Most Recent):  Temp: 98.8 °F (37.1 °C) (07/12/25 1912)  Pulse: 87 (07/12/25 1912)  Resp: (!) 26 (07/12/25 1912)  BP: 124/62 (07/12/25 1912)  SpO2: 99 % (07/12/25 1912) Vital Signs (24h Range):  Temp:  [97.5 °F (36.4 °C)-98.8 °F (37.1 °C)] 98.8 °F (37.1 °C)  Pulse:  [67-87] 87  Resp:  [14-47] 26  SpO2:  [97 %-100 %] 99 %  BP: ()/(50-95) 124/62   Weight: 77.6 kg (171 lb 1.2 oz)  Body mass index is 27.61 kg/m².      Intake/Output Summary (Last 24 hours) at 7/12/2025 1943  Last data filed at 7/12/2025 1852  Gross per 24 hour   Intake 1869.84 ml   Output 875 ml   Net 994.84 ml          Physical Exam  Vitals and nursing note reviewed.   Constitutional:       General: She is not in acute distress.     Appearance: She is well-developed. She is obese. She is not ill-appearing, toxic-appearing or diaphoretic.   HENT:      Head: Normocephalic and atraumatic.      Comments: NGT in place     Right Ear: External ear normal.      Left Ear: External ear normal.      Nose: Nose normal.      Mouth/Throat:      Mouth: Mucous membranes are moist.   Eyes:      Conjunctiva/sclera: Conjunctivae normal.      Pupils: Pupils are equal, round, and reactive to light.   Cardiovascular:      Rate and Rhythm: Normal rate and regular rhythm.      Pulses: Normal pulses.   Pulmonary:      Effort: Pulmonary effort is normal.   Abdominal:      General: Bowel sounds are normal. There is no distension.      Palpations: Abdomen is soft.      Tenderness: There is no abdominal tenderness. There is no guarding or rebound.   Musculoskeletal:         General: No  swelling.   Skin:     General: Skin is warm and dry.   Neurological:      Mental Status: She is alert.            Vents:     Lines/Drains/Airways       Peripheral Intravenous Line  Duration             Peripheral IV Single Lumen 07/11/25 1600 20 G 1 3/4 in Anterior;Left Forearm 1 day    Peripheral IV 07/12/25 0648 20 G Long PIVC Anterior;Right Forearm <1 day                  Significant Labs:    CBC/Anemia Profile:  Recent Labs   Lab 07/11/25  0405 07/12/25  0431   WBC 12.69 12.30   HGB 12.6 13.2   HCT 37.8 39.5    309   MCV 88 87   RDW 13.5 13.5        Chemistries:  Recent Labs   Lab 07/11/25  0405 07/11/25  2125 07/12/25  0431    136 135*   K 3.4* 3.2* 4.3    109 110   CO2 19* 17* 18*   BUN 11 9 10   CREATININE 0.7 0.6 0.6   CALCIUM 9.6 9.2 9.0   ALBUMIN 3.8 3.4* 3.4*   PROT 6.9 6.4 6.4   BILITOT 0.7 0.7 0.7   ALKPHOS 40 37* 36*   ALT 20 19 19   AST 23 23 23   MG 1.8  --  1.9   PHOS 2.2*  --  3.6       All pertinent labs within the past 24 hours have been reviewed.    Significant Imaging:  I have reviewed all pertinent imaging results/findings within the past 24 hours.

## 2025-07-13 NOTE — PROGRESS NOTES
[facility] - Medical ICU  Vascular Surgery  Progress Note    Patient Name: Shelbie Santamaria  MRN: [id number]  Admission Date: 7/7/2025  Primary Care Provider: Sunny Alexandre NP    Subjective:     Interval History:   STEFANO  Doing well this morning  Tolerated clears without nausea or vomiting  Reports having some anxiety overnight    Post-Op Info:  Procedure(s) (LRB):  REPAIR, ANEURYSM, AORTA, ENDOVASCULAR (N/A)         Medications:  Continuous Infusions:  Scheduled Meds:   amLODIPine  10 mg Oral Daily    aspirin  81 mg Oral Daily    DULoxetine  60 mg Oral Daily    enoxparin  40 mg Subcutaneous Q24H (prophylaxis, 1700)    ezetimibe  10 mg Oral Nightly    fenofibrate  160 mg Oral Daily    magnesium sulfate 2 g IVPB  2 g Intravenous Once    metoprolol tartrate  150 mg Oral BID    mupirocin   Nasal BID    pantoprazole  40 mg Intravenous Daily     PRN Meds:  Current Facility-Administered Medications:     calcium gluconate IVPB, 1 g, Intravenous, PRN    calcium gluconate IVPB, 2 g, Intravenous, PRN    calcium gluconate IVPB, 3 g, Intravenous, PRN    dextrose 50%, 12.5 g, Intravenous, PRN    dicyclomine, 10 mg, Oral, QID PRN    glucagon (human recombinant), 1 mg, Intramuscular, PRN    hydrALAZINE, 20 mg, Intravenous, Q4H PRN    HYDROmorphone, 1 mg, Intravenous, Q3H PRN    insulin aspart U-100, 0-5 Units, Subcutaneous, Q6H PRN    labetalol, 10 mg, Intravenous, Q2H PRN    magnesium sulfate 2 g IVPB, 2 g, Intravenous, PRN    magnesium sulfate 2 g IVPB, 2 g, Intravenous, PRN    metoclopramide, 10 mg, Intravenous, Q6H PRN    ondansetron, 4 mg, Intravenous, Q6H PRN    polyethylene glycol, 17 g, Oral, Daily PRN    potassium chloride, 40 mEq, Intravenous, PRN **AND** potassium chloride, 60 mEq, Intravenous, PRN **AND** potassium chloride, 80 mEq, Intravenous, PRN    senna-docusate, 1 tablet, Oral, Daily PRN    sodium chloride 0.9%, 10 mL, Intravenous, PRN    sodium phosphate 15 mmol in D5W 250 mL IVPB, 15 mmol, Intravenous,  PRN    sodium phosphate 20.1 mmol in D5W 250 mL IVPB, 20.1 mmol, Intravenous, PRN    sodium phosphate 30 mmol in D5W 250 mL IVPB, 30 mmol, Intravenous, PRN     Objective:     Vital Signs (Most Recent):  Temp: 98.1 °F (36.7 °C) (07/13/25 0800)  Pulse: 79 (07/13/25 0900)  Resp: 13 (07/13/25 0900)  BP: (!) 118/58 (07/13/25 0900)  SpO2: 100 % (07/13/25 0900) Vital Signs (24h Range):  Temp:  [97.9 °F (36.6 °C)-98.8 °F (37.1 °C)] 98.1 °F (36.7 °C)  Pulse:  [] 79  Resp:  [13-47] 13  SpO2:  [95 %-100 %] 100 %  BP: ()/(50-95) 118/58          Physical Exam  HENT:      Head: Normocephalic and atraumatic.   Eyes:      Extraocular Movements: Extraocular movements intact.   Pulmonary:      Effort: Pulmonary effort is normal.   Abdominal:      General: There is no distension.      Palpations: Abdomen is soft. There is no mass.      Tenderness: There is no abdominal tenderness. There is no guarding or rebound.      Comments: No abdominal tenderness on exam   Skin:     General: Skin is warm.   Neurological:      General: No focal deficit present.      Mental Status: She is oriented to person, place, and time.          Significant Labs:  All pertinent labs from the last 24 hours have been reviewed.    Significant Diagnostics:  I have reviewed all pertinent imaging results/findings within the past 24 hours.  Assessment/Plan:     * Aortic dissection  60F with likely incidental discovery of short segment paravisceral aortic dissection w/o malperfusion with concomitant massive gastric distention, antral and duodenal inflammation in patient with long standing GI/enteral complaints and new foregut symptoms.     - Continue clears  - recommend BP goal <140, HR <80  - recommend ASA  - Consent obtained  - DVT Ppx  - PPI    Plan: PMEG on Monday 7/14        Kvng Corral MD  Vascular Surgery  [facility] - Medical ICU

## 2025-07-13 NOTE — PLAN OF CARE
Problem: Adult Inpatient Plan of Care  Goal: Plan of Care Review  Outcome: Progressing  Goal: Patient-Specific Goal (Individualized)  Outcome: Progressing  Goal: Absence of Hospital-Acquired Illness or Injury  Outcome: Progressing  Goal: Readiness for Transition of Care  Outcome: Progressing     Problem: Delirium  Goal: Optimal Coping  Outcome: Progressing  Goal: Improved Behavioral Control  Outcome: Progressing  Goal: Improved Attention and Thought Clarity  Outcome: Progressing  Goal: Improved Sleep  Outcome: Progressing     Problem: Infection  Goal: Absence of Infection Signs and Symptoms  Outcome: Progressing

## 2025-07-13 NOTE — ASSESSMENT & PLAN NOTE
60-year-old female with PMHx of HTN, HLD, and neuropathy, who presents for aortic dissection type B with hypertensive emergency.    CTA: c/w  focal dissection along the right posterior margin of the aorta that extends for a length of approximately 2cm. Vascular Surgery initially consulted and suspects her dissection is subacute or chronic; is not a surgical candidate.    Admitted to the MICU for medical management of AAA dissection.    - Esmolol gtt; titrate to maintain HR < 80 and SBP < 140  - PO lopressor and amlodipine  - add/Titrate Cardene gtt if BP becomes elevated  - PRN pain medications  - PRN anti-emetics  - Bentyl IM for suspected gas pain (noted on CTA A/P)

## 2025-07-14 ENCOUNTER — ANESTHESIA (OUTPATIENT)
Dept: SURGERY | Facility: HOSPITAL | Age: 60
End: 2025-07-14
Payer: COMMERCIAL

## 2025-07-14 PROBLEM — G89.18 POST-OPERATIVE PAIN: Status: ACTIVE | Noted: 2025-07-14

## 2025-07-14 LAB
ABSOLUTE EOSINOPHIL (OHS): 0.11 K/UL
ABSOLUTE EOSINOPHIL (OHS): 0.19 K/UL
ABSOLUTE MONOCYTE (OHS): 0.43 K/UL (ref 0.3–1)
ABSOLUTE MONOCYTE (OHS): 1.06 K/UL (ref 0.3–1)
ABSOLUTE NEUTROPHIL COUNT (OHS): 10.79 K/UL (ref 1.8–7.7)
ABSOLUTE NEUTROPHIL COUNT (OHS): 5.49 K/UL (ref 1.8–7.7)
ALBUMIN SERPL BCP-MCNC: 3 G/DL (ref 3.5–5.2)
ALBUMIN SERPL BCP-MCNC: 3.1 G/DL (ref 3.5–5.2)
ALP SERPL-CCNC: 31 UNIT/L (ref 40–150)
ALP SERPL-CCNC: 37 UNIT/L (ref 40–150)
ALT SERPL W/O P-5'-P-CCNC: 25 UNIT/L (ref 10–44)
ALT SERPL W/O P-5'-P-CCNC: 30 UNIT/L (ref 10–44)
ANION GAP (OHS): 10 MMOL/L (ref 8–16)
ANION GAP (OHS): 9 MMOL/L (ref 8–16)
APTT PPP: 31.1 SECONDS (ref 21–32)
AST SERPL-CCNC: 38 UNIT/L (ref 11–45)
AST SERPL-CCNC: 45 UNIT/L (ref 11–45)
BASOPHILS # BLD AUTO: 0.02 K/UL
BASOPHILS # BLD AUTO: 0.03 K/UL
BASOPHILS NFR BLD AUTO: 0.2 %
BASOPHILS NFR BLD AUTO: 0.2 %
BILIRUB SERPL-MCNC: 0.5 MG/DL (ref 0.1–1)
BILIRUB SERPL-MCNC: 0.7 MG/DL (ref 0.1–1)
BUN SERPL-MCNC: 7 MG/DL (ref 8–23)
BUN SERPL-MCNC: 9 MG/DL (ref 8–23)
CALCIUM SERPL-MCNC: 8.6 MG/DL (ref 8.7–10.5)
CALCIUM SERPL-MCNC: 9.2 MG/DL (ref 8.7–10.5)
CHLORIDE SERPL-SCNC: 109 MMOL/L (ref 95–110)
CHLORIDE SERPL-SCNC: 110 MMOL/L (ref 95–110)
CO2 SERPL-SCNC: 19 MMOL/L (ref 23–29)
CO2 SERPL-SCNC: 20 MMOL/L (ref 23–29)
CREAT SERPL-MCNC: 0.6 MG/DL (ref 0.5–1.4)
CREAT SERPL-MCNC: 0.6 MG/DL (ref 0.5–1.4)
ERYTHROCYTE [DISTWIDTH] IN BLOOD BY AUTOMATED COUNT: 13.7 % (ref 11.5–14.5)
ERYTHROCYTE [DISTWIDTH] IN BLOOD BY AUTOMATED COUNT: 13.8 % (ref 11.5–14.5)
FIBRINOGEN PPP-MCNC: 402 MG/DL (ref 182–400)
GFR SERPLBLD CREATININE-BSD FMLA CKD-EPI: >60 ML/MIN/1.73/M2
GFR SERPLBLD CREATININE-BSD FMLA CKD-EPI: >60 ML/MIN/1.73/M2
GLUCOSE SERPL-MCNC: 89 MG/DL (ref 70–110)
GLUCOSE SERPL-MCNC: 92 MG/DL (ref 70–110)
GLUCOSE SERPL-MCNC: 98 MG/DL (ref 70–110)
HCO3 UR-SCNC: 19 MMOL/L (ref 24–28)
HCT VFR BLD AUTO: 32.7 % (ref 37–48.5)
HCT VFR BLD AUTO: 33.9 % (ref 37–48.5)
HCT VFR BLD CALC: 26 %PCV (ref 36–54)
HGB BLD-MCNC: 11 GM/DL (ref 12–16)
HGB BLD-MCNC: 11.5 GM/DL (ref 12–16)
IMM GRANULOCYTES # BLD AUTO: 0.05 K/UL (ref 0–0.04)
IMM GRANULOCYTES # BLD AUTO: 0.09 K/UL (ref 0–0.04)
IMM GRANULOCYTES NFR BLD AUTO: 0.5 % (ref 0–0.5)
IMM GRANULOCYTES NFR BLD AUTO: 0.7 % (ref 0–0.5)
INDIRECT COOMBS: NORMAL
INR PPP: 1.1 (ref 0.8–1.2)
LACTATE SERPL-SCNC: 0.5 MMOL/L (ref 0.5–2.2)
LYMPHOCYTES # BLD AUTO: 1.27 K/UL (ref 1–4.8)
LYMPHOCYTES # BLD AUTO: 3.63 K/UL (ref 1–4.8)
MAGNESIUM SERPL-MCNC: 1.8 MG/DL (ref 1.6–2.6)
MAGNESIUM SERPL-MCNC: 2.1 MG/DL (ref 1.6–2.6)
MCH RBC QN AUTO: 29.3 PG (ref 27–31)
MCH RBC QN AUTO: 29.4 PG (ref 27–31)
MCHC RBC AUTO-ENTMCNC: 33.6 G/DL (ref 32–36)
MCHC RBC AUTO-ENTMCNC: 33.9 G/DL (ref 32–36)
MCV RBC AUTO: 87 FL (ref 82–98)
MCV RBC AUTO: 87 FL (ref 82–98)
NUCLEATED RBC (/100WBC) (OHS): 0 /100 WBC
NUCLEATED RBC (/100WBC) (OHS): 0 /100 WBC
PCO2 BLDA: 30.5 MMHG (ref 35–45)
PH SMN: 7.4 [PH] (ref 7.35–7.45)
PHOSPHATE SERPL-MCNC: 2.8 MG/DL (ref 2.7–4.5)
PHOSPHATE SERPL-MCNC: 3.1 MG/DL (ref 2.7–4.5)
PLATELET # BLD AUTO: 249 K/UL (ref 150–450)
PLATELET # BLD AUTO: 302 K/UL (ref 150–450)
PMV BLD AUTO: 10.4 FL (ref 9.2–12.9)
PMV BLD AUTO: 10.5 FL (ref 9.2–12.9)
PO2 BLDA: 303 MMHG (ref 80–100)
POC ACTIVATED CLOTTING TIME K: 135 SEC (ref 74–137)
POC ACTIVATED CLOTTING TIME K: 285 SEC (ref 74–137)
POC BE: -6 MMOL/L (ref -2–2)
POC IONIZED CALCIUM: 1.21 MMOL/L (ref 1.06–1.42)
POC SATURATED O2: 100 % (ref 95–100)
POC TCO2: 20 MMOL/L (ref 23–27)
POCT GLUCOSE: 101 MG/DL (ref 70–110)
POCT GLUCOSE: 111 MG/DL (ref 70–110)
POCT GLUCOSE: 77 MG/DL (ref 70–110)
POCT GLUCOSE: 80 MG/DL (ref 70–110)
POCT GLUCOSE: 93 MG/DL (ref 70–110)
POCT GLUCOSE: 96 MG/DL (ref 70–110)
POTASSIUM BLD-SCNC: 3.4 MMOL/L (ref 3.5–5.1)
POTASSIUM SERPL-SCNC: 3.4 MMOL/L (ref 3.5–5.1)
POTASSIUM SERPL-SCNC: 3.9 MMOL/L (ref 3.5–5.1)
PROT SERPL-MCNC: 5.8 GM/DL (ref 6–8.4)
PROT SERPL-MCNC: 6 GM/DL (ref 6–8.4)
PROTHROMBIN TIME: 11.7 SECONDS (ref 9–12.5)
RBC # BLD AUTO: 3.74 M/UL (ref 4–5.4)
RBC # BLD AUTO: 3.92 M/UL (ref 4–5.4)
RELATIVE EOSINOPHIL (OHS): 0.9 %
RELATIVE EOSINOPHIL (OHS): 1.8 %
RELATIVE LYMPHOCYTE (OHS): 10 % (ref 18–48)
RELATIVE LYMPHOCYTE (OHS): 34.8 % (ref 18–48)
RELATIVE MONOCYTE (OHS): 10.2 % (ref 4–15)
RELATIVE MONOCYTE (OHS): 3.4 % (ref 4–15)
RELATIVE NEUTROPHIL (OHS): 52.5 % (ref 38–73)
RELATIVE NEUTROPHIL (OHS): 84.8 % (ref 38–73)
RH BLD: NORMAL
SAMPLE: ABNORMAL
SAMPLE: ABNORMAL
SAMPLE: NORMAL
SODIUM BLD-SCNC: 139 MMOL/L (ref 136–145)
SODIUM SERPL-SCNC: 138 MMOL/L (ref 136–145)
SODIUM SERPL-SCNC: 139 MMOL/L (ref 136–145)
WBC # BLD AUTO: 10.44 K/UL (ref 3.9–12.7)
WBC # BLD AUTO: 12.72 K/UL (ref 3.9–12.7)

## 2025-07-14 PROCEDURE — 27800903 OPTIME MED/SURG SUP & DEVICES OTHER IMPLANTS: Performed by: STUDENT IN AN ORGANIZED HEALTH CARE EDUCATION/TRAINING PROGRAM

## 2025-07-14 PROCEDURE — 85025 COMPLETE CBC W/AUTO DIFF WBC: CPT

## 2025-07-14 PROCEDURE — 34847 VISC & INFRAREN ABD 3 PROSTH: CPT | Mod: 62,,, | Performed by: SURGERY

## 2025-07-14 PROCEDURE — 83735 ASSAY OF MAGNESIUM: CPT

## 2025-07-14 PROCEDURE — 63600175 PHARM REV CODE 636 W HCPCS

## 2025-07-14 PROCEDURE — 37000008 HC ANESTHESIA 1ST 15 MINUTES: Performed by: STUDENT IN AN ORGANIZED HEALTH CARE EDUCATION/TRAINING PROGRAM

## 2025-07-14 PROCEDURE — 25500020 PHARM REV CODE 255: Performed by: STUDENT IN AN ORGANIZED HEALTH CARE EDUCATION/TRAINING PROGRAM

## 2025-07-14 PROCEDURE — 25000003 PHARM REV CODE 250

## 2025-07-14 PROCEDURE — C1887 CATHETER, GUIDING: HCPCS | Performed by: STUDENT IN AN ORGANIZED HEALTH CARE EDUCATION/TRAINING PROGRAM

## 2025-07-14 PROCEDURE — 83605 ASSAY OF LACTIC ACID: CPT

## 2025-07-14 PROCEDURE — 94761 N-INVAS EAR/PLS OXIMETRY MLT: CPT

## 2025-07-14 PROCEDURE — 63600175 PHARM REV CODE 636 W HCPCS: Performed by: STUDENT IN AN ORGANIZED HEALTH CARE EDUCATION/TRAINING PROGRAM

## 2025-07-14 PROCEDURE — 94799 UNLISTED PULMONARY SVC/PX: CPT

## 2025-07-14 PROCEDURE — 25000003 PHARM REV CODE 250: Performed by: INTERNAL MEDICINE

## 2025-07-14 PROCEDURE — C1894 INTRO/SHEATH, NON-LASER: HCPCS | Performed by: STUDENT IN AN ORGANIZED HEALTH CARE EDUCATION/TRAINING PROGRAM

## 2025-07-14 PROCEDURE — 99291 CRITICAL CARE FIRST HOUR: CPT | Mod: ,,, | Performed by: STUDENT IN AN ORGANIZED HEALTH CARE EDUCATION/TRAINING PROGRAM

## 2025-07-14 PROCEDURE — 34847 VISC & INFRAREN ABD 3 PROSTH: CPT | Mod: 62,,, | Performed by: STUDENT IN AN ORGANIZED HEALTH CARE EDUCATION/TRAINING PROGRAM

## 2025-07-14 PROCEDURE — 36000707: Performed by: STUDENT IN AN ORGANIZED HEALTH CARE EDUCATION/TRAINING PROGRAM

## 2025-07-14 PROCEDURE — 80053 COMPREHEN METABOLIC PANEL: CPT

## 2025-07-14 PROCEDURE — 86901 BLOOD TYPING SEROLOGIC RH(D): CPT | Performed by: INTERNAL MEDICINE

## 2025-07-14 PROCEDURE — 85610 PROTHROMBIN TIME: CPT

## 2025-07-14 PROCEDURE — C1874 STENT, COATED/COV W/DEL SYS: HCPCS | Performed by: STUDENT IN AN ORGANIZED HEALTH CARE EDUCATION/TRAINING PROGRAM

## 2025-07-14 PROCEDURE — 85384 FIBRINOGEN ACTIVITY: CPT

## 2025-07-14 PROCEDURE — 99900035 HC TECH TIME PER 15 MIN (STAT)

## 2025-07-14 PROCEDURE — 27201037 HC PRESSURE MONITORING SET UP

## 2025-07-14 PROCEDURE — 84100 ASSAY OF PHOSPHORUS: CPT

## 2025-07-14 PROCEDURE — 04V03FZ RESTRICTION OF ABDOMINAL AORTA WITH BRANCHED OR FENESTRATED INTRALUMINAL DEVICE, THREE OR MORE ARTERIES, PERCUTANEOUS APPROACH: ICD-10-PCS | Performed by: STUDENT IN AN ORGANIZED HEALTH CARE EDUCATION/TRAINING PROGRAM

## 2025-07-14 PROCEDURE — C1769 GUIDE WIRE: HCPCS | Performed by: STUDENT IN AN ORGANIZED HEALTH CARE EDUCATION/TRAINING PROGRAM

## 2025-07-14 PROCEDURE — 20000000 HC ICU ROOM

## 2025-07-14 PROCEDURE — C2628 CATHETER, OCCLUSION: HCPCS | Performed by: STUDENT IN AN ORGANIZED HEALTH CARE EDUCATION/TRAINING PROGRAM

## 2025-07-14 PROCEDURE — 27201423 OPTIME MED/SURG SUP & DEVICES STERILE SUPPLY: Performed by: STUDENT IN AN ORGANIZED HEALTH CARE EDUCATION/TRAINING PROGRAM

## 2025-07-14 PROCEDURE — 36000706: Performed by: STUDENT IN AN ORGANIZED HEALTH CARE EDUCATION/TRAINING PROGRAM

## 2025-07-14 PROCEDURE — 63600175 PHARM REV CODE 636 W HCPCS: Performed by: PHYSICIAN ASSISTANT

## 2025-07-14 PROCEDURE — 25000003 PHARM REV CODE 250: Performed by: STUDENT IN AN ORGANIZED HEALTH CARE EDUCATION/TRAINING PROGRAM

## 2025-07-14 PROCEDURE — 37000009 HC ANESTHESIA EA ADD 15 MINS: Performed by: STUDENT IN AN ORGANIZED HEALTH CARE EDUCATION/TRAINING PROGRAM

## 2025-07-14 PROCEDURE — 94664 DEMO&/EVAL PT USE INHALER: CPT

## 2025-07-14 PROCEDURE — C1760 CLOSURE DEV, VASC: HCPCS | Performed by: STUDENT IN AN ORGANIZED HEALTH CARE EDUCATION/TRAINING PROGRAM

## 2025-07-14 PROCEDURE — 85730 THROMBOPLASTIN TIME PARTIAL: CPT

## 2025-07-14 PROCEDURE — B41DYZZ FLUOROSCOPY OF AORTA AND BILATERAL LOWER EXTREMITY ARTERIES USING OTHER CONTRAST: ICD-10-PCS | Performed by: STUDENT IN AN ORGANIZED HEALTH CARE EDUCATION/TRAINING PROGRAM

## 2025-07-14 RX ORDER — ACETAMINOPHEN 10 MG/ML
INJECTION, SOLUTION INTRAVENOUS
Status: DISCONTINUED | OUTPATIENT
Start: 2025-07-14 | End: 2025-07-14

## 2025-07-14 RX ORDER — ACETAMINOPHEN 500 MG
1000 TABLET ORAL EVERY 8 HOURS
Status: DISCONTINUED | OUTPATIENT
Start: 2025-07-14 | End: 2025-07-16 | Stop reason: HOSPADM

## 2025-07-14 RX ORDER — MAGNESIUM SULFATE HEPTAHYDRATE 40 MG/ML
2 INJECTION, SOLUTION INTRAVENOUS ONCE
Status: COMPLETED | OUTPATIENT
Start: 2025-07-14 | End: 2025-07-14

## 2025-07-14 RX ORDER — HYDROMORPHONE HYDROCHLORIDE 1 MG/ML
0.5 INJECTION, SOLUTION INTRAMUSCULAR; INTRAVENOUS; SUBCUTANEOUS ONCE
Status: COMPLETED | OUTPATIENT
Start: 2025-07-14 | End: 2025-07-14

## 2025-07-14 RX ORDER — PROTAMINE SULFATE 10 MG/ML
INJECTION, SOLUTION INTRAVENOUS
Status: DISCONTINUED | OUTPATIENT
Start: 2025-07-14 | End: 2025-07-14

## 2025-07-14 RX ORDER — ROCURONIUM BROMIDE 10 MG/ML
INJECTION, SOLUTION INTRAVENOUS
Status: DISCONTINUED | OUTPATIENT
Start: 2025-07-14 | End: 2025-07-14

## 2025-07-14 RX ORDER — LORAZEPAM 0.5 MG/1
0.5 TABLET ORAL ONCE
Status: COMPLETED | OUTPATIENT
Start: 2025-07-14 | End: 2025-07-14

## 2025-07-14 RX ORDER — LIDOCAINE HYDROCHLORIDE 20 MG/ML
INJECTION, SOLUTION EPIDURAL; INFILTRATION; INTRACAUDAL; PERINEURAL
Status: DISCONTINUED | OUTPATIENT
Start: 2025-07-14 | End: 2025-07-14

## 2025-07-14 RX ORDER — ATORVASTATIN CALCIUM 40 MG/1
40 TABLET, FILM COATED ORAL DAILY
Status: DISCONTINUED | OUTPATIENT
Start: 2025-07-14 | End: 2025-07-16 | Stop reason: HOSPADM

## 2025-07-14 RX ORDER — ESMOLOL HYDROCHLORIDE 20 MG/ML
0-300 INJECTION, SOLUTION INTRAVENOUS CONTINUOUS
Status: DISCONTINUED | OUTPATIENT
Start: 2025-07-14 | End: 2025-07-14

## 2025-07-14 RX ORDER — HYDROMORPHONE HYDROCHLORIDE 1 MG/ML
INJECTION, SOLUTION INTRAMUSCULAR; INTRAVENOUS; SUBCUTANEOUS
Status: DISPENSED
Start: 2025-07-14 | End: 2025-07-15

## 2025-07-14 RX ORDER — FENTANYL CITRATE 50 UG/ML
INJECTION, SOLUTION INTRAMUSCULAR; INTRAVENOUS
Status: DISCONTINUED | OUTPATIENT
Start: 2025-07-14 | End: 2025-07-14

## 2025-07-14 RX ORDER — CLOPIDOGREL BISULFATE 75 MG/1
75 TABLET ORAL DAILY
Status: DISCONTINUED | OUTPATIENT
Start: 2025-07-14 | End: 2025-07-16 | Stop reason: HOSPADM

## 2025-07-14 RX ORDER — POTASSIUM CHLORIDE 7.45 MG/ML
10 INJECTION INTRAVENOUS
Status: COMPLETED | OUTPATIENT
Start: 2025-07-14 | End: 2025-07-14

## 2025-07-14 RX ORDER — KETAMINE HCL IN 0.9 % NACL 50 MG/5 ML
SYRINGE (ML) INTRAVENOUS
Status: DISCONTINUED | OUTPATIENT
Start: 2025-07-14 | End: 2025-07-14

## 2025-07-14 RX ORDER — PHENYLEPHRINE HYDROCHLORIDE 10 MG/ML
INJECTION INTRAVENOUS
Status: DISCONTINUED | OUTPATIENT
Start: 2025-07-14 | End: 2025-07-14

## 2025-07-14 RX ORDER — HEPARIN SOD,PORCINE/0.9 % NACL 1000/500ML
INTRAVENOUS SOLUTION INTRAVENOUS
Status: DISCONTINUED | OUTPATIENT
Start: 2025-07-14 | End: 2025-07-14 | Stop reason: HOSPADM

## 2025-07-14 RX ORDER — DEXMEDETOMIDINE HYDROCHLORIDE 100 UG/ML
INJECTION, SOLUTION INTRAVENOUS
Status: DISCONTINUED | OUTPATIENT
Start: 2025-07-14 | End: 2025-07-14

## 2025-07-14 RX ORDER — OXYCODONE HYDROCHLORIDE 5 MG/1
5 TABLET ORAL EVERY 6 HOURS PRN
Refills: 0 | Status: DISCONTINUED | OUTPATIENT
Start: 2025-07-14 | End: 2025-07-16 | Stop reason: HOSPADM

## 2025-07-14 RX ORDER — MIDAZOLAM HYDROCHLORIDE 1 MG/ML
INJECTION INTRAMUSCULAR; INTRAVENOUS
Status: DISCONTINUED | OUTPATIENT
Start: 2025-07-14 | End: 2025-07-14

## 2025-07-14 RX ORDER — DEXAMETHASONE SODIUM PHOSPHATE 4 MG/ML
INJECTION, SOLUTION INTRA-ARTICULAR; INTRALESIONAL; INTRAMUSCULAR; INTRAVENOUS; SOFT TISSUE
Status: DISCONTINUED | OUTPATIENT
Start: 2025-07-14 | End: 2025-07-14

## 2025-07-14 RX ORDER — MUPIROCIN 20 MG/G
OINTMENT TOPICAL 2 TIMES DAILY
Status: DISCONTINUED | OUTPATIENT
Start: 2025-07-14 | End: 2025-07-16 | Stop reason: HOSPADM

## 2025-07-14 RX ORDER — ONDANSETRON HYDROCHLORIDE 2 MG/ML
INJECTION, SOLUTION INTRAVENOUS
Status: DISCONTINUED | OUTPATIENT
Start: 2025-07-14 | End: 2025-07-14

## 2025-07-14 RX ORDER — PROPOFOL 10 MG/ML
VIAL (ML) INTRAVENOUS
Status: DISCONTINUED | OUTPATIENT
Start: 2025-07-14 | End: 2025-07-14

## 2025-07-14 RX ORDER — HEPARIN SODIUM 1000 [USP'U]/ML
INJECTION, SOLUTION INTRAVENOUS; SUBCUTANEOUS
Status: DISCONTINUED | OUTPATIENT
Start: 2025-07-14 | End: 2025-07-14

## 2025-07-14 RX ORDER — IODIXANOL 320 MG/ML
INJECTION, SOLUTION INTRAVASCULAR
Status: DISCONTINUED | OUTPATIENT
Start: 2025-07-14 | End: 2025-07-14 | Stop reason: HOSPADM

## 2025-07-14 RX ORDER — CEFAZOLIN SODIUM 1 G/3ML
INJECTION, POWDER, FOR SOLUTION INTRAMUSCULAR; INTRAVENOUS
Status: DISCONTINUED | OUTPATIENT
Start: 2025-07-14 | End: 2025-07-14

## 2025-07-14 RX ADMIN — HYDROMORPHONE HYDROCHLORIDE 0.5 MG: 1 INJECTION, SOLUTION INTRAMUSCULAR; INTRAVENOUS; SUBCUTANEOUS at 01:07

## 2025-07-14 RX ADMIN — Medication 10 MG: at 12:07

## 2025-07-14 RX ADMIN — ATORVASTATIN CALCIUM 40 MG: 40 TABLET, FILM COATED ORAL at 03:07

## 2025-07-14 RX ADMIN — PROTAMINE SULFATE 5 MG: 10 INJECTION, SOLUTION INTRAVENOUS at 12:07

## 2025-07-14 RX ADMIN — POTASSIUM CHLORIDE 10 MEQ: 7.46 INJECTION, SOLUTION INTRAVENOUS at 06:07

## 2025-07-14 RX ADMIN — EZETIMIBE 10 MG: 10 TABLET ORAL at 08:07

## 2025-07-14 RX ADMIN — DEXMEDETOMIDINE 4 MCG: 100 INJECTION, SOLUTION, CONCENTRATE INTRAVENOUS at 12:07

## 2025-07-14 RX ADMIN — DEXMEDETOMIDINE 12 MCG: 100 INJECTION, SOLUTION, CONCENTRATE INTRAVENOUS at 11:07

## 2025-07-14 RX ADMIN — FENTANYL CITRATE 50 MCG: 50 INJECTION, SOLUTION INTRAMUSCULAR; INTRAVENOUS at 12:07

## 2025-07-14 RX ADMIN — MAGNESIUM SULFATE HEPTAHYDRATE 2 G: 40 INJECTION, SOLUTION INTRAVENOUS at 04:07

## 2025-07-14 RX ADMIN — PROPOFOL 30 MG: 10 INJECTION, EMULSION INTRAVENOUS at 12:07

## 2025-07-14 RX ADMIN — LABETALOL HYDROCHLORIDE 10 MG: 5 INJECTION, SOLUTION INTRAVENOUS at 04:07

## 2025-07-14 RX ADMIN — ACETAMINOPHEN 1000 MG: 500 TABLET ORAL at 03:07

## 2025-07-14 RX ADMIN — SUGAMMADEX 200 MG: 100 INJECTION, SOLUTION INTRAVENOUS at 12:07

## 2025-07-14 RX ADMIN — METOPROLOL TARTRATE 150 MG: 50 TABLET, FILM COATED ORAL at 08:07

## 2025-07-14 RX ADMIN — PHENYLEPHRINE HYDROCHLORIDE 100 MCG: 10 INJECTION INTRAVENOUS at 11:07

## 2025-07-14 RX ADMIN — HYDROMORPHONE HYDROCHLORIDE 1 MG: 1 INJECTION, SOLUTION INTRAMUSCULAR; INTRAVENOUS; SUBCUTANEOUS at 03:07

## 2025-07-14 RX ADMIN — PROTAMINE SULFATE 15 MG: 10 INJECTION, SOLUTION INTRAVENOUS at 12:07

## 2025-07-14 RX ADMIN — Medication 20 MG: at 11:07

## 2025-07-14 RX ADMIN — ONDANSETRON 4 MG: 2 INJECTION INTRAMUSCULAR; INTRAVENOUS at 12:07

## 2025-07-14 RX ADMIN — PROTAMINE SULFATE 20 MG: 10 INJECTION, SOLUTION INTRAVENOUS at 12:07

## 2025-07-14 RX ADMIN — POTASSIUM CHLORIDE 10 MEQ: 7.46 INJECTION, SOLUTION INTRAVENOUS at 05:07

## 2025-07-14 RX ADMIN — PROPOFOL 40 MG: 10 INJECTION, EMULSION INTRAVENOUS at 12:07

## 2025-07-14 RX ADMIN — HEPARIN SODIUM 8000 UNITS: 1000 INJECTION, SOLUTION INTRAVENOUS; SUBCUTANEOUS at 11:07

## 2025-07-14 RX ADMIN — LIDOCAINE HYDROCHLORIDE 100 MG: 20 INJECTION, SOLUTION EPIDURAL; INFILTRATION; INTRACAUDAL; PERINEURAL at 11:07

## 2025-07-14 RX ADMIN — DULOXETINE 60 MG: 60 CAPSULE, DELAYED RELEASE ORAL at 04:07

## 2025-07-14 RX ADMIN — SODIUM CHLORIDE, SODIUM GLUCONATE, SODIUM ACETATE, POTASSIUM CHLORIDE, MAGNESIUM CHLORIDE, SODIUM PHOSPHATE, DIBASIC, AND POTASSIUM PHOSPHATE: .53; .5; .37; .037; .03; .012; .00082 INJECTION, SOLUTION INTRAVENOUS at 11:07

## 2025-07-14 RX ADMIN — PROPOFOL 120 MG: 10 INJECTION, EMULSION INTRAVENOUS at 11:07

## 2025-07-14 RX ADMIN — HEPARIN SODIUM 1000 UNITS: 1000 INJECTION, SOLUTION INTRAVENOUS; SUBCUTANEOUS at 11:07

## 2025-07-14 RX ADMIN — HYDROMORPHONE HYDROCHLORIDE 0.5 MG: 1 INJECTION, SOLUTION INTRAMUSCULAR; INTRAVENOUS; SUBCUTANEOUS at 05:07

## 2025-07-14 RX ADMIN — ESMOLOL HYDROCHLORIDE 50 MCG/KG/MIN: 20 INJECTION INTRAVENOUS at 09:07

## 2025-07-14 RX ADMIN — POTASSIUM CHLORIDE 10 MEQ: 7.46 INJECTION, SOLUTION INTRAVENOUS at 04:07

## 2025-07-14 RX ADMIN — MIDAZOLAM HYDROCHLORIDE 2 MG: 1 INJECTION, SOLUTION INTRAMUSCULAR; INTRAVENOUS at 10:07

## 2025-07-14 RX ADMIN — CEFAZOLIN 2 G: 330 INJECTION, POWDER, FOR SOLUTION INTRAMUSCULAR; INTRAVENOUS at 11:07

## 2025-07-14 RX ADMIN — OXYCODONE HYDROCHLORIDE 5 MG: 5 TABLET ORAL at 03:07

## 2025-07-14 RX ADMIN — CLOPIDOGREL 75 MG: 75 TABLET ORAL at 03:07

## 2025-07-14 RX ADMIN — PANTOPRAZOLE SODIUM 40 MG: 40 INJECTION, POWDER, LYOPHILIZED, FOR SOLUTION INTRAVENOUS at 08:07

## 2025-07-14 RX ADMIN — DEXAMETHASONE SODIUM PHOSPHATE 8 MG: 4 INJECTION, SOLUTION INTRAMUSCULAR; INTRAVENOUS at 11:07

## 2025-07-14 RX ADMIN — ROCURONIUM BROMIDE 50 MG: 10 INJECTION, SOLUTION INTRAVENOUS at 11:07

## 2025-07-14 RX ADMIN — ACETAMINOPHEN 1000 MG: 10 INJECTION INTRAVENOUS at 11:07

## 2025-07-14 RX ADMIN — LORAZEPAM 0.5 MG: 0.5 TABLET ORAL at 06:07

## 2025-07-14 RX ADMIN — POTASSIUM CHLORIDE 10 MEQ: 7.46 INJECTION, SOLUTION INTRAVENOUS at 08:07

## 2025-07-14 RX ADMIN — ACETAMINOPHEN 1000 MG: 500 TABLET ORAL at 08:07

## 2025-07-14 RX ADMIN — HYDRALAZINE HYDROCHLORIDE 20 MG: 20 INJECTION, SOLUTION INTRAMUSCULAR; INTRAVENOUS at 03:07

## 2025-07-14 RX ADMIN — FENTANYL CITRATE 100 MCG: 50 INJECTION, SOLUTION INTRAMUSCULAR; INTRAVENOUS at 11:07

## 2025-07-14 RX ADMIN — MUPIROCIN: 20 OINTMENT TOPICAL at 09:07

## 2025-07-14 NOTE — H&P
[facility] - Surgical Intensive Care  Critical Care - Surgery  History & Physical    Patient Name: Shelbie Santamaria  MRN: [id number]  Admission Date: 2025  Code Status: Full Code  Attending Physician: Alem Ornelas MD  Primary Care Provider: Sunny Alexandre NP   Principal Problem: Aortic dissection    Subjective:     HPI:  Shelbie Santamaria is a 60 year old female with HTN, HLD, and endometriosis who presented to the ED with c/o general malaise, anorexia, and chills x10 days. Was admitted to MICU with type B dissection. Complex EVAR performed on .     The patient presents to the SICU s/p EVAR with Dr. Pena on 2025. She is on room air and is in stable condition. Distal pulses palpable in all four extremities.     Immediate post-operative plans include hemodynamic stabilization. Plan to closely monitor fluid status with strict Is/Os and control pain.       Hospital/ICU Course:  No notes on file    Follow-up For: Procedure(s) (LRB):  REPAIR, ANEURYSM, AORTA, ENDOVASCULAR (N/A)    Post-Operative Day: * Day of Surgery *     Past Medical History:   Diagnosis Date    Essential (primary) hypertension     Headache        Past Surgical History:   Procedure Laterality Date    APPENDECTOMY      BREAST LUMPECTOMY      right     SECTION      COLONOSCOPY      CYSTOSCOPY      HYSTERECTOMY      pain- MI/BSO    PELVIC LAPAROSCOPY      TONSILLECTOMY         Review of patient's allergies indicates:   Allergen Reactions    Actifed plus      As a child    Erythromycin Other (See Comments)     unknown    Trazodone Palpitations       Family History    None       Tobacco Use    Smoking status: Former     Current packs/day: 0.00     Types: Cigarettes     Quit date: 2018     Years since quittin.4    Smokeless tobacco: Never   Substance and Sexual Activity    Alcohol use: Never    Drug use: Never    Sexual activity: Yes     Partners: Male     Birth control/protection: See Surgical Hx     Comment:        Review of Systems   All other systems reviewed and are negative.    Objective:     Vital Signs (Most Recent):  Temp: 98.5 °F (36.9 °C) (07/14/25 1340)  Pulse: 94 (07/14/25 1445)  Resp: (!) 33 (07/14/25 1445)  BP: (!) 157/69 (07/14/25 1415)  SpO2: 100 % (07/14/25 1445) Vital Signs (24h Range):  Temp:  [98 °F (36.7 °C)-99 °F (37.2 °C)] 98.5 °F (36.9 °C)  Pulse:  [60-99] 94  Resp:  [16-50] 33  SpO2:  [96 %-100 %] 100 %  BP: (106-168)/(55-84) 157/69  Arterial Line BP: (155-168)/(65-72) 166/71     Weight: 76 kg (167 lb 8.8 oz)  Body mass index is 27.04 kg/m².      Intake/Output Summary (Last 24 hours) at 7/14/2025 1449  Last data filed at 7/14/2025 1345  Gross per 24 hour   Intake 2085.48 ml   Output 1450 ml   Net 635.48 ml          Physical Exam  Constitutional:       Appearance: Normal appearance.   Cardiovascular:      Rate and Rhythm: Normal rate and regular rhythm.      Pulses: Normal pulses.   Pulmonary:      Effort: Pulmonary effort is normal.   Abdominal:      General: Abdomen is flat.      Palpations: Abdomen is soft.   Skin:     General: Skin is warm and dry.   Neurological:      General: No focal deficit present.      Mental Status: She is alert and oriented to person, place, and time.   Psychiatric:         Mood and Affect: Mood normal.         Behavior: Behavior normal.            Vents:       Lines/Drains/Airways       Drain  Duration                  Urethral Catheter 07/14/25 1134 Straight-tip 16 Fr. <1 day              Arterial Line  Duration             Arterial Line 07/14/25 1117 Left Radial <1 day              Peripheral Intravenous Line  Duration             Peripheral IV 07/12/25 0648 20 G Long PIVC Anterior;Right Forearm 2 days    Peripheral IV Single Lumen 07/11/25 1600 20 G 1 3/4 in Anterior;Left Forearm 2 days                    Significant Labs:    CBC/Anemia Profile:  Recent Labs   Lab 07/13/25  0215 07/14/25  0259 07/14/25  1338   WBC 9.87 10.44 12.72*   HGB 12.1 11.5* 11.0*    HCT 35.5* 33.9* 32.7*    249 302   MCV 86 87 87   RDW 13.5 13.8 13.7        Chemistries:  Recent Labs   Lab 07/13/25  0215 07/14/25  0259    139   K 3.6 3.4*    110   CO2 18* 20*   BUN 13 9   CREATININE 0.6 0.6   CALCIUM 9.2 9.2   ALBUMIN 3.1* 3.0*   PROT 5.9* 5.8*   BILITOT 0.5 0.5   ALKPHOS 32* 31*   ALT 20 25   AST 32 38   MG 1.9 1.8   PHOS 4.2 3.1       All pertinent labs within the past 24 hours have been reviewed.    Significant Imaging: I have reviewed all pertinent imaging results/findings within the past 24 hours.  Assessment/Plan:     Neuro  Post-operative pain  -scheduled tylenol  -oxy 5mg PRN    Cardiac/Vascular  * Aortic dissection  S/p EVAR on 7/14  -lay flat until 1800  -asa 81mg  -plavix 75mg  -SBP <160      HTN (hypertension)  -Amlodipine 10 mg daily  -Lopressor 150 mg BID    HLD (hyperlipidemia)  -Atorvastatin 40 mg daily  -Ezetemibe 10 mg nightly  -Fenofibrate 160 mg daily          Critical Care Daily Checklist:    A: Awake: RASS Goal/Actual Goal:    Actual:     B: Spontaneous Breathing Trial Performed?     C: SAT & SBT Coordinated?  N/a                      D: Delirium: CAM-ICU     E: Early Mobility Performed? Yes   F: Feeding Goal #1 PO intake will meet greater than or equal to 75% of estimated needs by RD follow up  Goal status #1: new  Goal #2: Maintain weight throughout hospitalization  Goal status #2: new   Current Diet Order   Procedures    Diet Consistent Carbohydrate 2000 Calories (up to 75 gm per meal); Standard Tray     Total calories / carbs::   2000 Calories (up to 75 gm per meal)     Tray type::   Standard Tray      AS: Analgesia/Sedation Tylenol, oxy   T: Thromboembolic Prophylaxis Asa, plavix   H: HOB > 300 No   U: Stress Ulcer Prophylaxis (if needed) PPI   G: Glucose Control    B: Bowel Function Unmeasured Stool Occurrence: 14   I: Indwelling Catheter (Lines & Tom) Necessity L radial art line, 2 PIVs   D: De-escalation of Antimicrobials/Pharmacotherapies N/a     Plan for the day/ETD     Code Status:  Family/Goals of Care: Full Code            Critical care was time spent personally by me on the following activities: development of treatment plan with patient or surrogate and bedside caregivers, discussions with consultants, evaluation of patient's response to treatment, examination of patient, ordering and performing treatments and interventions, ordering and review of laboratory studies, ordering and review of radiographic studies, pulse oximetry, re-evaluation of patient's condition.  This critical care time did not overlap with that of any other provider or involve time for any procedures.     Prema Tom MD  Critical Care - Surgery  [facility] - Surgical Intensive Care

## 2025-07-14 NOTE — ANESTHESIA PROCEDURE NOTES
Arterial    Diagnosis: Aortic dissection    Patient location during procedure: done in OR  Timeout: 7/14/2025 11:16 AM  Procedure end time: 7/14/2025 11:20 AM    Staffing  Authorizing Provider: Dago Cruz MD  Performing Provider: Iván Maldonado MD    Staffing  Performed by: Iván Maldonado MD  Authorized by: Dago Cruz MD    Anesthesiologist was present at the time of the procedure.    Preanesthetic Checklist  Completed: patient identified, IV checked, site marked, risks and benefits discussed, surgical consent, monitors and equipment checked, pre-op evaluation, timeout performed and anesthesia consent givenArterial  Skin Prep: chlorhexidine gluconate  Local Infiltration: none  Orientation: left  Location: radial    Catheter Size: 20 G  Catheter placement by Ultrasound guidance. Heme positive aspiration all ports.   Vessel Caliber: medium, patent, compressibility normal  Needle advanced into vessel with real time Ultrasound guidance.  Guidewire confirmed in vessel.Insertion Attempts: 1  Assessment  Dressing: secured with tape and tegaderm  Patient: Tolerated well

## 2025-07-14 NOTE — ANESTHESIA PROCEDURE NOTES
Intubation    Date/Time: 7/14/2025 11:15 AM    Performed by: Iván Maldonado MD  Authorized by: Dago Cruz MD    Intubation:     Induction:  Intravenous    Intubated:  Postinduction    Mask Ventilation:  Easy mask    Attempts:  1    Attempted By:  Student    Method of Intubation:  Video laryngoscopy    Blade:  Jung 3    Laryngeal View Grade: Grade I - full view of cords      Difficult Airway Encountered?: No      Complications:  None    Airway Device:  Oral endotracheal tube    Airway Device Size:  7.0    Style/Cuff Inflation:  Cuffed (inflated to minimal occlusive pressure)    Tube secured:  22    Secured at:  The lips    Placement Verified By:  Capnometry    Complicating Factors:  None    Findings Post-Intubation:  BS equal bilateral and atraumatic/condition of teeth unchanged

## 2025-07-14 NOTE — NURSING
Dr. Saenz at bedside to assess patient and discuss plan of care. Neurovascular check completed. Patient states no complaints at present. Orders received for SCDs to be placed. Will continue to monitor.

## 2025-07-14 NOTE — HPI
Shelbie Santamaria is a 60 year old female with HTN, HLD, and endometriosis who presented to the ED with c/o general malaise, anorexia, and chills x10 days. Was admitted to MICU with type B dissection. Complex EVAR performed on 7/14.     The patient presents to the CTVICU s/p EVAR with Dr. Pena on 07/14/2025. She is on room air and is in stable condition. Distal pulses palpable in all four extremities.     Immediate post-operative plans include hemodynamic stabilization. Plan to closely monitor fluid status with strict Is/Os and control pain.

## 2025-07-14 NOTE — SUBJECTIVE & OBJECTIVE
Follow-up For: Procedure(s) (LRB):  REPAIR, ANEURYSM, AORTA, ENDOVASCULAR (N/A)    Post-Operative Day: * Day of Surgery *     Past Medical History:   Diagnosis Date    Essential (primary) hypertension     Headache        Past Surgical History:   Procedure Laterality Date    APPENDECTOMY      BREAST LUMPECTOMY      right     SECTION      COLONOSCOPY      CYSTOSCOPY      HYSTERECTOMY      pain- MI/BSO    PELVIC LAPAROSCOPY      TONSILLECTOMY         Review of patient's allergies indicates:   Allergen Reactions    Actifed plus      As a child    Erythromycin Other (See Comments)     unknown    Trazodone Palpitations       Family History    None       Tobacco Use    Smoking status: Former     Current packs/day: 0.00     Types: Cigarettes     Quit date: 2018     Years since quittin.4    Smokeless tobacco: Never   Substance and Sexual Activity    Alcohol use: Never    Drug use: Never    Sexual activity: Yes     Partners: Male     Birth control/protection: See Surgical Hx     Comment:       Review of Systems   All other systems reviewed and are negative.    Objective:     Vital Signs (Most Recent):  Temp: 98.5 °F (36.9 °C) (25 1340)  Pulse: 94 (25 1445)  Resp: (!) 33 (25 1445)  BP: (!) 157/69 (25 1415)  SpO2: 100 % (25 1445) Vital Signs (24h Range):  Temp:  [98 °F (36.7 °C)-99 °F (37.2 °C)] 98.5 °F (36.9 °C)  Pulse:  [60-99] 94  Resp:  [16-50] 33  SpO2:  [96 %-100 %] 100 %  BP: (106-168)/(55-84) 157/69  Arterial Line BP: (155-168)/(65-72) 166/71     Weight: 76 kg (167 lb 8.8 oz)  Body mass index is 27.04 kg/m².      Intake/Output Summary (Last 24 hours) at 2025 1449  Last data filed at 2025 1345  Gross per 24 hour   Intake 2085.48 ml   Output 1450 ml   Net 635.48 ml          Physical Exam  Constitutional:       Appearance: Normal appearance.   Cardiovascular:      Rate and Rhythm: Normal rate and regular rhythm.      Pulses: Normal pulses.   Pulmonary:       Effort: Pulmonary effort is normal.   Abdominal:      General: Abdomen is flat.      Palpations: Abdomen is soft.   Skin:     General: Skin is warm and dry.   Neurological:      General: No focal deficit present.      Mental Status: She is alert and oriented to person, place, and time.   Psychiatric:         Mood and Affect: Mood normal.         Behavior: Behavior normal.            Vents:       Lines/Drains/Airways       Drain  Duration                  Urethral Catheter 07/14/25 1134 Straight-tip 16 Fr. <1 day              Arterial Line  Duration             Arterial Line 07/14/25 1117 Left Radial <1 day              Peripheral Intravenous Line  Duration             Peripheral IV 07/12/25 0648 20 G Long PIVC Anterior;Right Forearm 2 days    Peripheral IV Single Lumen 07/11/25 1600 20 G 1 3/4 in Anterior;Left Forearm 2 days                    Significant Labs:    CBC/Anemia Profile:  Recent Labs   Lab 07/13/25 0215 07/14/25  0259 07/14/25  1338   WBC 9.87 10.44 12.72*   HGB 12.1 11.5* 11.0*   HCT 35.5* 33.9* 32.7*    249 302   MCV 86 87 87   RDW 13.5 13.8 13.7        Chemistries:  Recent Labs   Lab 07/13/25  0215 07/14/25  0259    139   K 3.6 3.4*    110   CO2 18* 20*   BUN 13 9   CREATININE 0.6 0.6   CALCIUM 9.2 9.2   ALBUMIN 3.1* 3.0*   PROT 5.9* 5.8*   BILITOT 0.5 0.5   ALKPHOS 32* 31*   ALT 20 25   AST 32 38   MG 1.9 1.8   PHOS 4.2 3.1       All pertinent labs within the past 24 hours have been reviewed.    Significant Imaging: I have reviewed all pertinent imaging results/findings within the past 24 hours.

## 2025-07-14 NOTE — OP NOTE
Vascular Surgery Operative Report    Date of Operation: July 14, 2025    Pre-operative Diagnosis: Symptomatic juxtarenal abdominal aortic aneurysm, saccular    Post-operative Diagnosis: Symptomatic juxtarenal abdominal aortic aneurysm, saccular    Operation Performed:  (1) Ultrasound-guided bilateral common femoral artery access  (2) Perclose bilateral common femoral arteriotomies  (3) Aortagram with mesenteric and renal angiography-Selective retrograde catheterization of superior mesenteric and bilateral renal arteries  (4) Endovascular juxtarenal abdominal aortic aneurysm repair using Cook TX2 (Left main body , physician-modified with the addition of fenestrations for superior mesenteric and bilateral renal arteries as well as partial-constraint system)  (5) Superior mesenteric artery bridge stent graft using 7X22 Atrium iCAST  (6) Right renal artery bridge stent graft using 5X22 Atrium iCAST  (7) Left renal artery bridge stent graft using 5X22 Atrium iCAST     Surgeon: Jessica    Assistant: Raman    EBL: 75mL    GETA    Drains: None    Specimens: None    Complications: None    Disposition: To the SICU extubated, in stable condition, without focal neurologic deficit    Indication for Operation: Ms. Santamaria is a 59yo  female who was admitted with ten days of malaise and severe abdominal pain.  CTA from the OSH demonstrated a saccular juxtarenal abdominal aortic aneurysm.  She underwent thorough GI workup with continued symptoms.  She wished to pursue complex endovascular aortic repair.    Operation in Detail:  After informed consent was obtained, the patient was taken the operating room and placed in the supine position.  Her abdomen and pelvis and bilateral groins were prepped and draped in usual sterile fashion.  A time-out was performed verifying the identification of the patient as well as procedure to be performed.  We began by using the ultrasound and micropuncture technique to  cannulate the bilateral common femoral arteries, placing a short 8 Luxembourger sheath after deployment of 2 ProGlides in Perclose fashion each common femoral artery.  We brought up a Glidewire advantage from the left femoral access and exchanged for a double curve Lunderquist wire in the ascending aorta via a pigtail catheter.  We asked our anesthesia colleague Xiang 7000 units of IV heparin was allowed to circulate for 2 minutes verifying her ACT was greater than 250.  We brought up the pigtail catheter from the right femoral access shot an aortogram.  We then brought up the cook Tx  device deploying it cephalad to the target visceral vessels.  It should be noted that prior to the patient entering the operating room this Tx 2 device was modified in a sterile fashion on the back table with the addition of fenestration for superior mesenteric and bilateral renal arteries as well as a partial constrained system, repack edge of the original 20 Luxembourger sheath.  We did pre cannulate the left renal artery which was the lowest of the 2 using a 14 Luxembourger Hubertus DrySeal sheath and a 7 Luxembourger aptus steerable sheath and a Glidewire advantage from the right femoral access.  We then cannulated the fenestrated cuff bringing a 14 Luxembourger DrySeal sheath into the inferior aspect of the cuff.  We then used the 7 Luxembourger aptus steerable sheath and Glidewire advantage to cannulate the superior mesenteric right renal and left renal arteries leaving behind Rosario wires via 035 glide cath.  We then completed deployment of the fenestrated device and used a Coda balloon to balloon proximal distal landing zones.  Through the aptus steerable sheath we then brought up a 7 x 22 atrium iCAST and the superior mesenteric artery 5 x 22 atrium iCAST into the each of the right and left renal arteries post dilated with a 9 x 2 balloon completion aortogram was performed through the pigtail catheter.  We removed all sheaths catheters and wires.  We then  deployed the ProGlide preclose fashion without extravasation or hematoma.  The patient had palpable DP pulses bilaterally at the conclusion of the case.  I was scrubbed and present and performed key portions of the operation.  All counts were correct x2 at the conclusion of case.    Interpretation of the radiographic findings:  A large saccular juxtarenal abdominal aortic aneurysm excluded using physician modified endograft.  Patent superior mesenteric and bilateral renal arteries at the conclusion of the case.  There was no evidence of type 1 or 3 endoleak with a late type 2 endoleak likely from a lumbar.

## 2025-07-14 NOTE — EICU
Intervention Initiated From:  [facility] / EICU    Yandel intervened regarding:  Rounding (Video assessment)    Comments: Virtual ICU Quality Rounds    Admit Date: 7/7/2025  Hospital Day: 7    ICU Day: 10m    24H Vital Sign Range:  Temp:  [98 °F (36.7 °C)-99 °F (37.2 °C)]   Pulse:  [60-99]   Resp:  [16-50]   BP: (106-143)/(55-84)   SpO2:  [96 %-100 %]   Arterial Line BP: (168)/(72)     VICU Surveillance Screening    Daily review of  line necessity(optional): Urinary catheter in place    Tom Indications : Critically ill in the intensive care unit requiring hourly urine output monitoring     LDA reconciliation : Yes

## 2025-07-14 NOTE — PLAN OF CARE
MICU DAILY GOALS     Family/Goals of care/Code Status   Code Status: Full Code    24H Vital Sign Range  Temp:  [98 °F (36.7 °C)-98.8 °F (37.1 °C)]   Pulse:  []   Resp:  [13-36]   BP: (100-130)/(55-69)   SpO2:  [95 %-100 %]      Shift Events (include procedures and significant events)   No acute events throughout shift. Pt NPO for PMEG this morning, CHG bath given, pt voices no concerns or questions about procedure. Pain management on going, replacing electrolytes this morning.     AWAKE RASS: Goal -    Actual - RASS ([place] Agitation-Sedation Scale): alert and calm    Restraint necessity: Not necessary   BREATHE SBT: Not intubated    Coordinate A & B, analgesics/sedatives Pain: managed   SAT: Not intubated   Delirium CAM-ICU:     Early(intubated/ Progressive (non-intubated) Mobility MOVE Screen (INTUBATED ONLY): Not intubated    Activity: Activity Management: Walk with assistive devise and /or staff member - L3   Feeding/Nutrition Diet order: Diet/Nutrition Received: clear liquid,     Thrombus DVT prophylaxis: VTE Core Measure: Pharmacological prophylaxis initiated/maintained   HOB Elevation Head of Bed (HOB) Positioning: HOB elevated   Ulcer Prophylaxis GI: yes   Glucose control managed     Skin Skin assessment:     Sacrum intact/not altered? Yes  Heels intact/not altered? Yes  Surgical wound? No    CHECK ONE!   (no altered skin or altered skin) and sub boxes:  [x] No Altered Skin Integrity Present    [x]Prevention Measures Documented    [] Altered Skin Integrity Present or Discovered   [] LDA already present in EPIC, daily doc completed              [] LDA added if not already in EPIC (describe/stage wound).               [] Wound Image Taken (required on admit,                   transfer/discharge and every Tuesday)    Wound Care Consulted? No   Bowel Function constipation    Indwelling Catheter Necessity          De-escalation Antibiotics No        VS and assessment per flow sheet, patient progressing  towards goals as tolerated, plan of care reviewed with pt and , all concerns addressed, will continue w/ POC.

## 2025-07-14 NOTE — NURSING
Pt went to [id number] after surgery. RN called in order to give report. RN received anesthesia report. Family, Jed, called and notified of transfer. Family aware.

## 2025-07-14 NOTE — EICU
Intervention Initiated From:  [facility] / YANNIU    Yandel intervened regarding:  Rounding (Video assessment)    VICU Night Rounds Checklist  24H Vital Sign Range:  Temp:  [98 °F (36.7 °C)-98.8 °F (37.1 °C)]   Pulse:  []   Resp:  [13-36]   BP: (100-130)/(55-69)   SpO2:  [95 %-100 %]     Video rounds

## 2025-07-14 NOTE — NURSING
0937: HR 90bpm. PA contacted. Ordered to hold morning meds metoprolol and amlodipine and restart esmolol as ordered. Esmolol started at 50mcg/kg/min for a HR of 90. Goal <80bpm. /63 (90).     0955: HR remains >80. Dose increased to 75mcg/kg/min.    Pt picked up by anesthesia for surgery.

## 2025-07-14 NOTE — SUBJECTIVE & OBJECTIVE
Interval History/Significant Events: No acute events noted overnight.    Review of Systems   All other systems reviewed and are negative.    Objective:     Vital Signs (Most Recent):  Temp: 99 °F (37.2 °C) (07/14/25 0701)  Pulse: 90 (07/14/25 0935)  Resp: (!) 28 (07/14/25 0901)  BP: 132/63 (07/14/25 0935)  SpO2: 100 % (07/14/25 0901) Vital Signs (24h Range):  Temp:  [98 °F (36.7 °C)-99 °F (37.2 °C)] 99 °F (37.2 °C)  Pulse:  [60-90] 90  Resp:  [16-35] 28  SpO2:  [96 %-100 %] 100 %  BP: (104-143)/(55-84) 132/63   Weight: 76 kg (167 lb 8.8 oz)  Body mass index is 27.04 kg/m².      Intake/Output Summary (Last 24 hours) at 7/14/2025 0957  Last data filed at 7/14/2025 0901  Gross per 24 hour   Intake 604.54 ml   Output 1550 ml   Net -945.46 ml          Physical Exam  Vitals and nursing note reviewed.            Vents:     Lines/Drains/Airways       Peripheral Intravenous Line  Duration             Peripheral IV 07/12/25 0648 20 G Long PIVC Anterior;Right Forearm 2 days    Peripheral IV Single Lumen 07/11/25 1600 20 G 1 3/4 in Anterior;Left Forearm 2 days                  Significant Labs:    CBC/Anemia Profile:  Recent Labs   Lab 07/13/25 0215 07/14/25  0259   WBC 9.87 10.44   HGB 12.1 11.5*   HCT 35.5* 33.9*    249   MCV 86 87   RDW 13.5 13.8        Chemistries:  Recent Labs   Lab 07/13/25 0215 07/14/25  0259    139   K 3.6 3.4*    110   CO2 18* 20*   BUN 13 9   CREATININE 0.6 0.6   CALCIUM 9.2 9.2   ALBUMIN 3.1* 3.0*   PROT 5.9* 5.8*   BILITOT 0.5 0.5   ALKPHOS 32* 31*   ALT 20 25   AST 32 38   MG 1.9 1.8   PHOS 4.2 3.1       All pertinent labs within the past 24 hours have been reviewed.    Significant Imaging:  I have reviewed all pertinent imaging results/findings within the past 24 hours.

## 2025-07-14 NOTE — SUBJECTIVE & OBJECTIVE
Interval History/Significant Events: improved pain control. Feeling better    Review of Systems   Respiratory:  Negative for chest tightness and wheezing.    Gastrointestinal:  Negative for abdominal pain, constipation, diarrhea and rectal pain.   Genitourinary:  Negative for flank pain, pelvic pain and vaginal pain.   Musculoskeletal:  Negative for back pain.     Objective:     Vital Signs (Most Recent):  Temp: 98.8 °F (37.1 °C) (07/13/25 1920)  Pulse: 82 (07/13/25 2010)  Resp: (!) 33 (07/13/25 2010)  BP: (!) 108/55 (07/13/25 2000)  SpO2: 100 % (07/13/25 2010) Vital Signs (24h Range):  Temp:  [98 °F (36.7 °C)-98.8 °F (37.1 °C)] 98.8 °F (37.1 °C)  Pulse:  [] 82  Resp:  [13-36] 33  SpO2:  [95 %-100 %] 100 %  BP: (100-130)/(55-69) 108/55   Weight: 76 kg (167 lb 8.8 oz)  Body mass index is 27.04 kg/m².      Intake/Output Summary (Last 24 hours) at 7/13/2025 2021  Last data filed at 7/13/2025 1826  Gross per 24 hour   Intake 650.8 ml   Output 2750 ml   Net -2099.2 ml          Physical Exam  Vitals and nursing note reviewed.   Constitutional:       General: She is not in acute distress.     Appearance: She is well-developed. She is obese. She is not ill-appearing, toxic-appearing or diaphoretic.   HENT:      Head: Normocephalic and atraumatic.      Comments: NGT in place     Right Ear: External ear normal.      Left Ear: External ear normal.      Nose: Nose normal.      Mouth/Throat:      Mouth: Mucous membranes are moist.   Eyes:      Conjunctiva/sclera: Conjunctivae normal.      Pupils: Pupils are equal, round, and reactive to light.   Cardiovascular:      Rate and Rhythm: Normal rate and regular rhythm.      Pulses: Normal pulses.   Pulmonary:      Effort: Pulmonary effort is normal.   Abdominal:      General: Bowel sounds are normal. There is no distension.      Palpations: Abdomen is soft.      Tenderness: There is no abdominal tenderness. There is no guarding or rebound.   Musculoskeletal:         General: No  swelling.   Skin:     General: Skin is warm and dry.   Neurological:      Mental Status: She is alert.            Vents:     Lines/Drains/Airways       Peripheral Intravenous Line  Duration             Peripheral IV Single Lumen 07/11/25 1600 20 G 1 3/4 in Anterior;Left Forearm 2 days    Peripheral IV 07/12/25 0648 20 G Long PIVC Anterior;Right Forearm 1 day                  Significant Labs:    CBC/Anemia Profile:  Recent Labs   Lab 07/12/25  0431 07/13/25  0215   WBC 12.30 9.87   HGB 13.2 12.1   HCT 39.5 35.5*    302   MCV 87 86   RDW 13.5 13.5        Chemistries:  Recent Labs   Lab 07/11/25 2125 07/12/25  0431 07/13/25  0215    135* 137   K 3.2* 4.3 3.6    110 110   CO2 17* 18* 18*   BUN 9 10 13   CREATININE 0.6 0.6 0.6   CALCIUM 9.2 9.0 9.2   ALBUMIN 3.4* 3.4* 3.1*   PROT 6.4 6.4 5.9*   BILITOT 0.7 0.7 0.5   ALKPHOS 37* 36* 32*   ALT 19 19 20   AST 23 23 32   MG  --  1.9 1.9   PHOS  --  3.6 4.2       All pertinent labs within the past 24 hours have been reviewed.    Significant Imaging:  I have reviewed all pertinent imaging results/findings within the past 24 hours.

## 2025-07-14 NOTE — TRANSFER OF CARE
"Anesthesia Transfer of Care Note    Patient: Shelbie Santamaria    Procedure(s) Performed: Procedure(s) (LRB):  REPAIR, ANEURYSM, AORTA, ENDOVASCULAR (N/A)    Patient location: ICU    Anesthesia Type: general    Transport from OR: Transported from OR on room air with adequate spontaneous ventilation    Post pain: adequate analgesia    Post assessment: no apparent anesthetic complications and tolerated procedure well    Post vital signs: stable    Level of consciousness: awake, alert and oriented    Nausea/Vomiting: no nausea/vomiting    Complications: none    Transfer of care protocol was followed      Last vitals: Visit Vitals  BP (!) 117/57   Pulse 79   Temp 37.2 °C (99 °F) (Oral)   Resp 17   Ht 5' 6" (1.676 m)   Wt 76 kg (167 lb 8.8 oz)   SpO2 100%   BMI 27.04 kg/m²     "

## 2025-07-14 NOTE — CARE UPDATE
Patient Story Summary   MELISSA Santamaria  Generated at: 2025 06:26  Powered by [employer] AI. Intended to summarize previously documented patient information. Not a replacement for clinical judgment and not intended for diagnosis, treatment, cure, mitigation, or prevention of a disease or condition.    Shelbie Santamaria is a 60-year-old female admitted on 2025 for management of aortic dissection.    History  Shelbie Santamaria has a history of hypertension, hyperlipidemia, and endometriosis.   She underwent appendectomy, breast lumpectomy,  section, colonoscopy, cystoscopy, hysterectomy, pelvic laparoscopy, and tonsillectomy.     Events During Admission  2025: Transferred for evaluation of aortic dissection confirmed by imaging, presenting with nausea and epigastric pain.  Vascular surgery suspected chronic dissection and recommended medical management. Esmolol and Cardene drips were initiated to control blood pressure and heart rate.   2025: Continued on esmolol with oral metoprolol and amlodipine added. NG tube placed for gastric decompression due to nausea. Pain improved with medication.   2025: Imaging showed no change in aortic dissection. Vascular surgery planned for possible intervention next week.   07/10/2025: Pain and nausea managed with medications; NG tube remained in place.   2025: Esmolol infusion and oral antihypertensives. NG tube output minimal, and abdominal pain improved.   2025: Pain control improved, and activity increased.  Continued medical management of aortic dissection with esmolol and oral medications.   2025: Planned for vascular surgery intervention; remained in ICU for monitoring.     Recent Events on Patient  No acute events occurred during the shift, and the patient was NPO for PMEG this morning.   Pain management is ongoing, and electrolytes were replaced this morning.   Hemoglobin decreased from 12.1 gm/dL to 11.5  gm/dL, and hematocrit decreased from 35.5% to 33.9%.   LORazepam 0.5 mg and HYDROmorphone 0.5 mg were administered once, and potassium chloride and magnesium sulfate were given as needed.   Diet order changed to NPO effective midnight, and a clear liquid diet was received.

## 2025-07-14 NOTE — PROGRESS NOTES
[facility] - Medical ICU  Critical Care Medicine  Progress Note    Patient Name: Shelbie Santamaria  MRN: [id number]  Admission Date: 7/7/2025  Hospital Length of Stay: 6 days  Code Status: Full Code  Attending Provider: Chacorta Whittaker*  Primary Care Provider: Sunny Alexandre NP   Principal Problem: Aortic dissection    Subjective:     HPI:  Ms. Santamaria is a 60yoF with PMHx significant for HTN, HLD, and endometriosis who presented to the ED with c/o general malaise, anorexia, and chills x10 days. Per report, went to lunch today and after trying to eat, had an exacerbation of these ongoing symptoms prompting her presentation to an OSH. Imaging was obtained that demonstrated an aortic dissection in her paravisceral segment; chronicity unknown.   She was transferred to [facility] for further evaluation and Vascular Surgery consult. States long-standing history of foregut and GI illnesses. Chief complaint upon exam in our ED was nausea and epigastric pain. Takes aspirin and Zetia at home and is also a former smoker. Denies back pain, chest pain, shortness of breath, or other complaints. Vascular consulted and deemed not surgical candidate; thought that AAA is either subacute or chronic. Pt. Was placed on esmolol gtt and cardene gtt. Hemodynamic goals of SBP <140 and HR <80 per Vascular recs.    While in the ED, labs and imaging significant for WBC 14.18, H/H 12.9/37.9, plts 327, PTT 26.2, Pt 11.9, INR 1.1, Na 137, K 3.8, Cl 107, CO2 15, glucose 124, BUN 16, creatinine 0.7, ALP 41, AST/ALT 27/13, anion gap 15.    [facility] consulted for medical management of type B aortic dissection. Will admit to the MICU for further management.    Hospital/ICU Course:  07/09/25: repeat CTA of abd/pelvis performed due to abdominal pain. No change in imaging.  07/10/25: Noted to have urinary retention. In and out with 800ml output. Abd pain better. Wants to increase activity. Vascular requesting blood cultures  07/11/25:  Decreasing amounts of esmolol.  07/12/25: Continue to titrate esmolol. NGT removed. Advance to clears  07/13/25: Better BP/HR control. Pain is improved.    Interval History/Significant Events: improved pain control. Feeling better    Review of Systems   Respiratory:  Negative for chest tightness and wheezing.    Gastrointestinal:  Negative for abdominal pain, constipation, diarrhea and rectal pain.   Genitourinary:  Negative for flank pain, pelvic pain and vaginal pain.   Musculoskeletal:  Negative for back pain.     Objective:     Vital Signs (Most Recent):  Temp: 98.8 °F (37.1 °C) (07/13/25 1920)  Pulse: 82 (07/13/25 2010)  Resp: (!) 33 (07/13/25 2010)  BP: (!) 108/55 (07/13/25 2000)  SpO2: 100 % (07/13/25 2010) Vital Signs (24h Range):  Temp:  [98 °F (36.7 °C)-98.8 °F (37.1 °C)] 98.8 °F (37.1 °C)  Pulse:  [] 82  Resp:  [13-36] 33  SpO2:  [95 %-100 %] 100 %  BP: (100-130)/(55-69) 108/55   Weight: 76 kg (167 lb 8.8 oz)  Body mass index is 27.04 kg/m².      Intake/Output Summary (Last 24 hours) at 7/13/2025 2021  Last data filed at 7/13/2025 1826  Gross per 24 hour   Intake 650.8 ml   Output 2750 ml   Net -2099.2 ml          Physical Exam  Vitals and nursing note reviewed.   Constitutional:       General: She is not in acute distress.     Appearance: She is well-developed. She is obese. She is not ill-appearing, toxic-appearing or diaphoretic.   HENT:      Head: Normocephalic and atraumatic.      Comments: NGT in place     Right Ear: External ear normal.      Left Ear: External ear normal.      Nose: Nose normal.      Mouth/Throat:      Mouth: Mucous membranes are moist.   Eyes:      Conjunctiva/sclera: Conjunctivae normal.      Pupils: Pupils are equal, round, and reactive to light.   Cardiovascular:      Rate and Rhythm: Normal rate and regular rhythm.      Pulses: Normal pulses.   Pulmonary:      Effort: Pulmonary effort is normal.   Abdominal:      General: Bowel sounds are normal. There is no distension.       Palpations: Abdomen is soft.      Tenderness: There is no abdominal tenderness. There is no guarding or rebound.   Musculoskeletal:         General: No swelling.   Skin:     General: Skin is warm and dry.   Neurological:      Mental Status: She is alert.            Vents:     Lines/Drains/Airways       Peripheral Intravenous Line  Duration             Peripheral IV Single Lumen 07/11/25 1600 20 G 1 3/4 in Anterior;Left Forearm 2 days    Peripheral IV 07/12/25 0648 20 G Long PIVC Anterior;Right Forearm 1 day                  Significant Labs:    CBC/Anemia Profile:  Recent Labs   Lab 07/12/25 0431 07/13/25  0215   WBC 12.30 9.87   HGB 13.2 12.1   HCT 39.5 35.5*    302   MCV 87 86   RDW 13.5 13.5        Chemistries:  Recent Labs   Lab 07/11/25 2125 07/12/25 0431 07/13/25 0215    135* 137   K 3.2* 4.3 3.6    110 110   CO2 17* 18* 18*   BUN 9 10 13   CREATININE 0.6 0.6 0.6   CALCIUM 9.2 9.0 9.2   ALBUMIN 3.4* 3.4* 3.1*   PROT 6.4 6.4 5.9*   BILITOT 0.7 0.7 0.5   ALKPHOS 37* 36* 32*   ALT 19 19 20   AST 23 23 32   MG  --  1.9 1.9   PHOS  --  3.6 4.2       All pertinent labs within the past 24 hours have been reviewed.    Significant Imaging:  I have reviewed all pertinent imaging results/findings within the past 24 hours.    ABG  Recent Labs   Lab 07/09/25  0056   PH 7.349*   PO2 49   PCO2 37.0   HCO3 20.4*   BE -5*     Assessment/Plan:     Cardiac/Vascular  * Aortic dissection  60-year-old female with PMHx of HTN, HLD, and neuropathy, who presents for aortic dissection type B with hypertensive emergency.    CTA: c/w  focal dissection along the right posterior margin of the aorta that extends for a length of approximately 2cm. Vascular Surgery initially consulted and suspects her dissection is subacute or chronic; is not a surgical candidate.    Admitted to the MICU for medical management of AAA dissection.    - Esmolol gtt; titrate to maintain HR < 80 and SBP < 140  - PO lopressor and  amlodipine  - add/Titrate Cardene gtt if BP becomes elevated  - PRN pain medications  - PRN anti-emetics  - Bentyl IM for suspected gas pain (noted on CTA A/P)    Plan for Vascular to take to OR tomorrow. She will be going to ICU    HTN (hypertension)  Takes amlodipine and metoprolol at baseline.  - Continue home medications. Increase B-blocker  - Telemetry    HLD (hyperlipidemia)  Takes fenofibrate and ezetimibe at baseline.  - Continue home medications    Renal/  Urinary retention  -- likley from opiates/bentyl  - bladder scan q shift  - PRN in-and-out catheter    Hypokalemia  - Replace PRN to maintain >4    GI  Abdominal pain  Multifactorial  CTA without evidence of bowel ischemia    Nausea & vomiting  Intermittent nausea, vomiting, and abd pain throughout stay. CTA A/P with gaseous distention. KUB with moderate stool burden.    - Antiemetics PRN; Zofran, phenergan, and droperidol  - Bentyl IM x1 for suspected gas pain  - NGT placed for decompression in the setting of nausea with vomiting and inability to tolerate PO  - PRN EKG for QTC monitoring  - Strict I/Os  - Serial abd exams  - Bowel reg    Duodenitis  - Dilaudid PRN for pain  - PPI daily  - Bowel regemine  - Bentyl      Chacorta Whittaker MD  Critical Care Medicine  [facility] - Medical ICU

## 2025-07-14 NOTE — BRIEF OP NOTE
[facility] - Medical ICU  Brief Operative Note    SUMMARY     Surgery Date: 7/14/2025     Surgeons and Role:     * Chata Pena MD - Primary     * Adebayo Camargo MD - Fellow     * Dayton Saenz MD - Co-Surgeon    Pre-op Diagnosis:  Aortic dissection [I71.00]    Post-op Diagnosis:  Post-Op Diagnosis Codes:     * Aortic dissection [I71.00]    Procedure:   Bilateral ultrasound guided access CFA   Preclose closure of above, L 18 Fr Dryseal, R 14 Fr Dryseal   Physician Modified Endograft Repair of Thoracoabdominal Aneurysm: 22-117Cook Zenith TX2 ZDEG; SMA 7x22mm iCast, R renal 6x22mm iCast, L renal 6x22mm iCast  Selective catherization SMA, R renal, L renal     Anesthesia: General    Operative Findings:   3 vessel physician modified repair of thoracoabdominal dissection with aneurysmal component with visceral stenting     Palpable DP signals at conclusion of case      ASA, plavix in post operative setting  6 hours flat time  No groin complications    Estimated Blood Loss: 20 cc         Specimens: None

## 2025-07-14 NOTE — ASSESSMENT & PLAN NOTE
60-year-old female with PMHx of HTN, HLD, and neuropathy, who presents for aortic dissection type B with hypertensive emergency.    CTA: c/w  focal dissection along the right posterior margin of the aorta that extends for a length of approximately 2cm. Vascular Surgery initially consulted and suspects her dissection is subacute or chronic; is not a surgical candidate.    Admitted to the MICU for medical management of AAA dissection.    - Esmolol gtt; titrate to maintain HR < 80 and SBP < 140  - PO lopressor and amlodipine  - add/Titrate Cardene gtt if BP becomes elevated  - PRN pain medications  - PRN anti-emetics  - Bentyl IM for suspected gas pain (noted on CTA A/P)    Plan for Vascular to take to OR tomorrow. She will be going to ICU

## 2025-07-15 PROBLEM — E87.6 HYPOKALEMIA: Status: RESOLVED | Noted: 2025-07-08 | Resolved: 2025-07-15

## 2025-07-15 PROBLEM — R10.9 ABDOMINAL PAIN: Status: RESOLVED | Noted: 2025-07-09 | Resolved: 2025-07-15

## 2025-07-15 LAB
ABSOLUTE EOSINOPHIL (OHS): 0 K/UL
ABSOLUTE MONOCYTE (OHS): 0.66 K/UL (ref 0.3–1)
ABSOLUTE NEUTROPHIL COUNT (OHS): 10.16 K/UL (ref 1.8–7.7)
ALBUMIN SERPL BCP-MCNC: 3 G/DL (ref 3.5–5.2)
ALP SERPL-CCNC: 31 UNIT/L (ref 40–150)
ALT SERPL W/O P-5'-P-CCNC: 39 UNIT/L (ref 10–44)
ANION GAP (OHS): 6 MMOL/L (ref 8–16)
AST SERPL-CCNC: 49 UNIT/L (ref 11–45)
BACTERIA BLD CULT: NORMAL
BACTERIA BLD CULT: NORMAL
BASOPHILS # BLD AUTO: 0.01 K/UL
BASOPHILS NFR BLD AUTO: 0.1 %
BILIRUB SERPL-MCNC: 0.5 MG/DL (ref 0.1–1)
BUN SERPL-MCNC: 7 MG/DL (ref 8–23)
CALCIUM SERPL-MCNC: 8.9 MG/DL (ref 8.7–10.5)
CHLORIDE SERPL-SCNC: 112 MMOL/L (ref 95–110)
CO2 SERPL-SCNC: 21 MMOL/L (ref 23–29)
CREAT SERPL-MCNC: 0.6 MG/DL (ref 0.5–1.4)
ERYTHROCYTE [DISTWIDTH] IN BLOOD BY AUTOMATED COUNT: 13.9 % (ref 11.5–14.5)
GFR SERPLBLD CREATININE-BSD FMLA CKD-EPI: >60 ML/MIN/1.73/M2
GLUCOSE SERPL-MCNC: 107 MG/DL (ref 70–110)
HCT VFR BLD AUTO: 30.9 % (ref 37–48.5)
HGB BLD-MCNC: 10.1 GM/DL (ref 12–16)
IMM GRANULOCYTES # BLD AUTO: 0.06 K/UL (ref 0–0.04)
IMM GRANULOCYTES NFR BLD AUTO: 0.5 % (ref 0–0.5)
LYMPHOCYTES # BLD AUTO: 0.94 K/UL (ref 1–4.8)
MAGNESIUM SERPL-MCNC: 1.9 MG/DL (ref 1.6–2.6)
MCH RBC QN AUTO: 28.6 PG (ref 27–31)
MCHC RBC AUTO-ENTMCNC: 32.7 G/DL (ref 32–36)
MCV RBC AUTO: 88 FL (ref 82–98)
NUCLEATED RBC (/100WBC) (OHS): 0 /100 WBC
PHOSPHATE SERPL-MCNC: 2.8 MG/DL (ref 2.7–4.5)
PLATELET # BLD AUTO: 307 K/UL (ref 150–450)
PMV BLD AUTO: 10.2 FL (ref 9.2–12.9)
POCT GLUCOSE: 89 MG/DL (ref 70–110)
POCT GLUCOSE: 90 MG/DL (ref 70–110)
POCT GLUCOSE: 92 MG/DL (ref 70–110)
POTASSIUM SERPL-SCNC: 4.1 MMOL/L (ref 3.5–5.1)
PROT SERPL-MCNC: 5.9 GM/DL (ref 6–8.4)
RBC # BLD AUTO: 3.53 M/UL (ref 4–5.4)
RELATIVE EOSINOPHIL (OHS): 0 %
RELATIVE LYMPHOCYTE (OHS): 7.9 % (ref 18–48)
RELATIVE MONOCYTE (OHS): 5.6 % (ref 4–15)
RELATIVE NEUTROPHIL (OHS): 85.9 % (ref 38–73)
SODIUM SERPL-SCNC: 139 MMOL/L (ref 136–145)
WBC # BLD AUTO: 11.83 K/UL (ref 3.9–12.7)

## 2025-07-15 PROCEDURE — 25000003 PHARM REV CODE 250: Performed by: INTERNAL MEDICINE

## 2025-07-15 PROCEDURE — 25000003 PHARM REV CODE 250

## 2025-07-15 PROCEDURE — 25000242 PHARM REV CODE 250 ALT 637 W/ HCPCS

## 2025-07-15 PROCEDURE — 80053 COMPREHEN METABOLIC PANEL: CPT

## 2025-07-15 PROCEDURE — 97165 OT EVAL LOW COMPLEX 30 MIN: CPT

## 2025-07-15 PROCEDURE — 84100 ASSAY OF PHOSPHORUS: CPT

## 2025-07-15 PROCEDURE — 83735 ASSAY OF MAGNESIUM: CPT

## 2025-07-15 PROCEDURE — 94761 N-INVAS EAR/PLS OXIMETRY MLT: CPT

## 2025-07-15 PROCEDURE — 85025 COMPLETE CBC W/AUTO DIFF WBC: CPT

## 2025-07-15 PROCEDURE — 97162 PT EVAL MOD COMPLEX 30 MIN: CPT

## 2025-07-15 PROCEDURE — 97535 SELF CARE MNGMENT TRAINING: CPT

## 2025-07-15 PROCEDURE — 20600001 HC STEP DOWN PRIVATE ROOM

## 2025-07-15 PROCEDURE — 63600175 PHARM REV CODE 636 W HCPCS

## 2025-07-15 PROCEDURE — 97116 GAIT TRAINING THERAPY: CPT

## 2025-07-15 PROCEDURE — 25000003 PHARM REV CODE 250: Performed by: STUDENT IN AN ORGANIZED HEALTH CARE EDUCATION/TRAINING PROGRAM

## 2025-07-15 RX ORDER — ENOXAPARIN SODIUM 100 MG/ML
40 INJECTION SUBCUTANEOUS EVERY 24 HOURS
Status: DISCONTINUED | OUTPATIENT
Start: 2025-07-15 | End: 2025-07-16 | Stop reason: HOSPADM

## 2025-07-15 RX ORDER — METOPROLOL TARTRATE 50 MG/1
50 TABLET ORAL 2 TIMES DAILY
Status: DISCONTINUED | OUTPATIENT
Start: 2025-07-15 | End: 2025-07-16 | Stop reason: HOSPADM

## 2025-07-15 RX ORDER — HYDROXYZINE HYDROCHLORIDE 25 MG/1
25 TABLET, FILM COATED ORAL NIGHTLY PRN
Status: DISCONTINUED | OUTPATIENT
Start: 2025-07-15 | End: 2025-07-16 | Stop reason: HOSPADM

## 2025-07-15 RX ORDER — CALCIUM CARBONATE 200(500)MG
1000 TABLET,CHEWABLE ORAL
Status: DISCONTINUED | OUTPATIENT
Start: 2025-07-15 | End: 2025-07-16 | Stop reason: HOSPADM

## 2025-07-15 RX ORDER — POLYETHYLENE GLYCOL 3350 17 G/17G
17 POWDER, FOR SOLUTION ORAL DAILY
Status: DISCONTINUED | OUTPATIENT
Start: 2025-07-16 | End: 2025-07-16 | Stop reason: HOSPADM

## 2025-07-15 RX ADMIN — ACETAMINOPHEN 1000 MG: 500 TABLET ORAL at 01:07

## 2025-07-15 RX ADMIN — PANTOPRAZOLE SODIUM 40 MG: 40 INJECTION, POWDER, LYOPHILIZED, FOR SOLUTION INTRAVENOUS at 09:07

## 2025-07-15 RX ADMIN — EZETIMIBE 10 MG: 10 TABLET ORAL at 09:07

## 2025-07-15 RX ADMIN — METOPROLOL TARTRATE 50 MG: 50 TABLET, FILM COATED ORAL at 09:07

## 2025-07-15 RX ADMIN — MUPIROCIN: 20 OINTMENT TOPICAL at 09:07

## 2025-07-15 RX ADMIN — CLOPIDOGREL 75 MG: 75 TABLET ORAL at 09:07

## 2025-07-15 RX ADMIN — ATORVASTATIN CALCIUM 40 MG: 40 TABLET, FILM COATED ORAL at 09:07

## 2025-07-15 RX ADMIN — ACETAMINOPHEN 1000 MG: 500 TABLET ORAL at 06:07

## 2025-07-15 RX ADMIN — AMLODIPINE BESYLATE 10 MG: 10 TABLET ORAL at 09:07

## 2025-07-15 RX ADMIN — HYDROXYZINE HYDROCHLORIDE 25 MG: 25 TABLET, FILM COATED ORAL at 09:07

## 2025-07-15 RX ADMIN — DULOXETINE 60 MG: 60 CAPSULE, DELAYED RELEASE ORAL at 09:07

## 2025-07-15 RX ADMIN — METOPROLOL TARTRATE 150 MG: 50 TABLET, FILM COATED ORAL at 09:07

## 2025-07-15 RX ADMIN — ACETAMINOPHEN 1000 MG: 500 TABLET ORAL at 09:07

## 2025-07-15 RX ADMIN — FENOFIBRATE 160 MG: 160 TABLET ORAL at 09:07

## 2025-07-15 RX ADMIN — ENOXAPARIN SODIUM 40 MG: 40 INJECTION SUBCUTANEOUS at 05:07

## 2025-07-15 RX ADMIN — ASPIRIN 81 MG CHEWABLE TABLET 81 MG: 81 TABLET CHEWABLE at 09:07

## 2025-07-15 NOTE — PT/OT/SLP EVAL
"Occupational Therapy  Co Evaluation    Name: Shelbie Santamaria  MRN: [id number]  Admitting Diagnosis: Aortic dissection  Recent Surgery: Procedure(s) (LRB):  REPAIR, ANEURYSM, AORTA, ENDOVASCULAR (N/A) 1 Day Post-Op  EVAR 7/14/25  Recommendations:     Discharge Recommendations: Low Intensity Therapy    Assessment:     Shelbie Santamaria is a 60 y.o. female with a medical diagnosis of Aortic dissection. Pt with deficits  related to decreased strength/endurance, impaired functional mobility/ADl skills. Pt tolerated session well with good effort and performance. Anticipate pt will progress well. Pt to benefit from cont OT to address stated goals.     Rehab Prognosis: Good; patient would benefit from acute skilled OT services to address these deficits and reach maximum level of function.       Plan:     Patient to be seen 4 x/week to address the above listed problems via self-care/home management, therapeutic activities, therapeutic exercises  Plan of Care Expires:    Plan of Care Reviewed with: patient    Subjective   Pt agreeable to therapy   "I would not always call it pain, I call it anxiety" pt reports.     Occupational Profile:    Pain/Comfort:  Pain Rating 1: 6/10  Location 1: chest  Pain Addressed 1: Reposition, Distraction    Patients cultural, spiritual, Religion conflicts given the current situation:      Objective:     Communicated with: nsg prior to session.  Patient found in chair with tele, pulse ox, BP cuff and Iv  Spouse in room.  Co eval completed this date to optimize functional performance and safety given impaired tolerance for activity in setting of ICU with increased acuity/medical complexity.     General Precautions: Standard,  fall     Occupational Performance:    Functional Mobility/Transfers:  Sit>stand CGA  Functional mobility with CGA    Activities of Daily Living:  Feeding: set-up  G/H: standing with CGA for oral care and washing face. Pt with B UE integration and adequate " standing endurance.   LE dressing: TOTAL A     Cognitive/Visual Perceptual:  Pt awake, alert and following commands.     Physical Exam:  Pt is right hand dominant and demo WFL UE strength/ROM, coordination and sensation     AMPAC 6 Click ADL:  AMPAC Total Score: 17    Treatment & Education:  Pt completed functional mobility in room and hallway with CGA. Pt unsteady and occasionally reaching for external support. VSS throughout session.   Education provided re: role of OT and safety with functional mobility/ADL skills. .     Patient left up in chair with all lines intact, call button in reach, nsg notified, and spouse present    GOALS:   Multidisciplinary Problems       Occupational Therapy Goals          Problem: Occupational Therapy    Goal Priority Disciplines Outcome Interventions   Occupational Therapy Goal     OT, PT/OT Progressing    Description: Goals to be met by: 7 days 25     Patient will increase functional independence with ADLs by performing:    Pt to complete standing g/h skills with supervision.   Pt to complete toileting with supervision.  Pt to complete t/f bed, chair and commode with Supervision.  Pt to complete UE dressing with set-up  Pt to complete LE dressing with SBA                         History:     Past Medical History:   Diagnosis Date    Essential (primary) hypertension     Headache          Past Surgical History:   Procedure Laterality Date    APPENDECTOMY      BREAST LUMPECTOMY      right     SECTION      COLONOSCOPY      CYSTOSCOPY      HYSTERECTOMY      pain- MI/BSO    PELVIC LAPAROSCOPY      REPAIR, ANEURYSM, AORTA, ENDOVASCULAR N/A 2025    Procedure: REPAIR, ANEURYSM, AORTA, ENDOVASCULAR;  Surgeon: Chata Pena MD;  Location: [facility] OR [address] [place] [address];  Service: Vascular;  Laterality: N/A;  mGy  1363.54   Gycm2 93.9449   Flouro time 12.8 min  Contrast amount 62ml    TONSILLECTOMY         Time Tracking:     OT Date of Treatment: 07/15/25  OT Start Time:  0915  OT Stop Time: 0938  OT Total Time (min): 23 min    Billable Minutes:Evaluation 15  Self Care/Home Management 8    7/15/2025

## 2025-07-15 NOTE — ANESTHESIA POSTPROCEDURE EVALUATION
Anesthesia Post Evaluation    Patient: Shelbie Santamaria    Procedure(s) Performed: Procedure(s) (LRB):  REPAIR, ANEURYSM, AORTA, ENDOVASCULAR (N/A)    Final Anesthesia Type: general      Patient location during evaluation: ICU  Patient participation: Yes- Able to Participate  Level of consciousness: awake  Post-procedure vital signs: reviewed and stable  Pain management: adequate  Airway patency: patent      Anesthetic complications: no      Cardiovascular status: hemodynamically stable  Follow-up not needed.              Vitals Value Taken Time   /58 07/15/25 06:02   Temp 36.7 °C (98 °F) 07/14/25 23:00   Pulse 83 07/15/25 06:41   Resp 34 07/15/25 06:41   SpO2 99 % 07/15/25 06:41   Vitals shown include unfiled device data.      No case tracking events are documented in the log.      Pain/Eda Score: Pain Rating Prior to Med Admin: 2 (7/15/2025  6:36 AM)  Pain Rating Post Med Admin: 0 (7/14/2025  4:07 PM)

## 2025-07-15 NOTE — SUBJECTIVE & OBJECTIVE
Medications:  Continuous Infusions:  Scheduled Meds:   acetaminophen  1,000 mg Oral Q8H    amLODIPine  10 mg Oral Daily    aspirin  81 mg Oral Daily    atorvastatin  40 mg Oral Daily    clopidogreL  75 mg Oral Daily    DULoxetine  60 mg Oral Daily    ezetimibe  10 mg Oral Nightly    fenofibrate  160 mg Oral Daily    metoprolol tartrate  150 mg Oral BID    mupirocin   Nasal BID    pantoprazole  40 mg Intravenous Daily     PRN Meds:  Current Facility-Administered Medications:     calcium gluconate IVPB, 1 g, Intravenous, PRN    calcium gluconate IVPB, 2 g, Intravenous, PRN    calcium gluconate IVPB, 3 g, Intravenous, PRN    dicyclomine, 10 mg, Oral, QID PRN    hydrALAZINE, 20 mg, Intravenous, Q4H PRN    labetalol, 10 mg, Intravenous, Q2H PRN    magnesium sulfate 2 g IVPB, 2 g, Intravenous, PRN    magnesium sulfate 2 g IVPB, 2 g, Intravenous, PRN    metoclopramide, 10 mg, Intravenous, Q6H PRN    ondansetron, 4 mg, Intravenous, Q6H PRN    oxyCODONE, 5 mg, Oral, Q6H PRN    polyethylene glycol, 17 g, Oral, Daily PRN    potassium chloride, 40 mEq, Intravenous, PRN **AND** potassium chloride, 60 mEq, Intravenous, PRN **AND** potassium chloride, 80 mEq, Intravenous, PRN    senna-docusate, 1 tablet, Oral, Daily PRN    sodium chloride 0.9%, 10 mL, Intravenous, PRN    sodium phosphate 15 mmol in D5W 250 mL IVPB, 15 mmol, Intravenous, PRN    sodium phosphate 20.1 mmol in D5W 250 mL IVPB, 20.1 mmol, Intravenous, PRN    sodium phosphate 30 mmol in D5W 250 mL IVPB, 30 mmol, Intravenous, PRN     Objective:     Vital Signs (Most Recent):  Temp: 98 °F (36.7 °C) (07/14/25 2300)  Pulse: 70 (07/15/25 0600)  Resp: (!) 22 (07/15/25 0600)  BP: (!) 125/58 (07/15/25 0600)  SpO2: 99 % (07/15/25 0600) Vital Signs (24h Range):  Temp:  [97.7 °F (36.5 °C)-98.5 °F (36.9 °C)] 98 °F (36.7 °C)  Pulse:  [] 70  Resp:  [15-50] 22  SpO2:  [96 %-100 %] 99 %  BP: ()/(55-76) 125/58  Arterial Line BP: (103-168)/(49-72) 132/62          Physical  Exam  Vitals reviewed.   Constitutional:       General: She is awake.      Appearance: Normal appearance.      Comments: On RA   HENT:      Head: Normocephalic.      Nose: Nose normal.      Mouth/Throat:      Mouth: Mucous membranes are moist.   Eyes:      Pupils: Pupils are equal, round, and reactive to light.   Cardiovascular:      Rate and Rhythm: Normal rate and regular rhythm.      Pulses: Normal pulses.   Pulmonary:      Effort: Pulmonary effort is normal. No respiratory distress.   Abdominal:      General: Abdomen is flat.   Genitourinary:     Comments: Tom   Musculoskeletal:      Cervical back: Normal range of motion.   Skin:     General: Skin is warm and dry.      Capillary Refill: Capillary refill takes less than 2 seconds.      Coloration: Skin is not jaundiced.      Comments: Bilateral groin site dsg CDI no swelling no drainage   Neurological:      General: No focal deficit present.      Mental Status: She is alert.      Sensory: Sensation is intact.      Motor: Motor function is intact.   Psychiatric:         Attention and Perception: Attention and perception normal.         Mood and Affect: Mood normal.         Behavior: Behavior normal. Behavior is cooperative.          Significant Labs:  CBC:   Recent Labs   Lab 07/15/25  0313   WBC 11.83   RBC 3.53*   HGB 10.1*   HCT 30.9*      MCV 88   MCH 28.6   MCHC 32.7     CMP:   Recent Labs   Lab 07/15/25  0313      CALCIUM 8.9   ALBUMIN 3.0*   PROT 5.9*      K 4.1   CO2 21*   *   BUN 7*   CREATININE 0.6   ALKPHOS 31*   ALT 39   AST 49*   BILITOT 0.5     All pertinent labs from the last 24 hours have been reviewed.    Significant Diagnostics:  I have reviewed all pertinent imaging results/findings within the past 24 hours.

## 2025-07-15 NOTE — NURSING TRANSFER
Nursing Transfer Note      7/15/2025   1:30 PM    Nurse giving handoff:NORM Borja  Nurse receiving handoff:NORM Cuellar    Reason patient is being transferred: Improved condition    Transfer To: 1027    Transfer via wheelchair    Transfer with cardiac monitoring    Transported by NORM Borja    Transfer Vital Signs:  Blood Pressure:138/85  Heart Rate:71  O2:100  Temperature:98.8  Respirations:18    Telemetry: Box Number [id number], Rate 79, Rhythm NSR, and Telemetry  Female  Order for Tele Monitor? Yes    Additional Lines: peripheral iv x2-saline locked  Medicines sent: Mupiricon    Any special needs or follow-up needed: no    Patient belongings transferred with patient: Yes    Chart send with patient: Yes    Notified: spouse    Patient reassessed at: 1320 7/15 (date, time)  1  Upon arrival to floor: patient oriented to room, call bell in reach, and bed in lowest position

## 2025-07-15 NOTE — PLAN OF CARE
Problem: Adult Inpatient Plan of Care  Goal: Plan of Care Review  Outcome: Progressing  Goal: Patient-Specific Goal (Individualized)  Outcome: Progressing  Goal: Absence of Hospital-Acquired Illness or Injury  Outcome: Progressing  Goal: Optimal Comfort and Wellbeing  Outcome: Progressing  Goal: Readiness for Transition of Care  Outcome: Progressing     Problem: Delirium  Goal: Optimal Coping  Outcome: Progressing  Goal: Improved Behavioral Control  Outcome: Progressing  Goal: Improved Attention and Thought Clarity  Outcome: Progressing  Goal: Improved Sleep  Outcome: Progressing     Problem: Infection  Goal: Absence of Infection Signs and Symptoms  Outcome: Progressing     Problem: Wound  Goal: Optimal Coping  Outcome: Progressing  Goal: Optimal Functional Ability  Outcome: Progressing  Goal: Absence of Infection Signs and Symptoms  Outcome: Progressing  Goal: Improved Oral Intake  Outcome: Progressing  Goal: Optimal Pain Control and Function  Outcome: Progressing  Goal: Skin Health and Integrity  Outcome: Progressing  Goal: Optimal Wound Healing  Outcome: Progressing     Problem: Fall Injury Risk  Goal: Absence of Fall and Fall-Related Injury  Outcome: Progressing

## 2025-07-15 NOTE — PT/OT/SLP EVAL
"Physical Therapy Co-Evaluation and Co-Treatment    Patient Name:  Shelbie Santamaria   MRN:  [id number]  Admit Date: 7/7/2025  Admitting Diagnosis:  Aortic dissection   Length of Stay: 8 days  Recent Surgery: Procedure(s) (LRB):  REPAIR, ANEURYSM, AORTA, ENDOVASCULAR (N/A) 1 Day Post-Op    Recommendations:     Discharge Recommendations: Low Intensity Therapy  Discharge Equipment Recommendations: none   Barriers to discharge: increased physical assistance required    Appropriate transfer level with nursing staff: one person assist ambulation    Plan:     During this hospitalization, patient to be seen 4 x/week to address the identified rehab impairments via gait training, therapeutic activities, therapeutic exercises, neuromuscular re-education and progress towards the established goals.  Plan of Care Expires:  08/14/25  Plan of Care Reviewed with: patient, spouse    Assessment     Shelbie Santamaria is a 60 y.o. female admitted with a medical diagnosis of Aortic dissection. Pt found upright in chair with  in room and agreeable to PT evaluation. Pt with main c/o of chest discomfort which pt attributes to anxiety. Pt required CGA for STS transfer and ambulation trial with HHA due to unsteadiness and pt reporting feeling "wobbly." All vital signs remained stable throughout session. Pt currently presents below their PLOF due to recent surgery. Pt would continue to benefit from skilled PT services for further improvements in functional endurance, functional mobility, gait quality/distance, and upright tolerance.      Problem List: weakness, impaired endurance, impaired functional mobility, gait instability, impaired balance, impaired cardiopulmonary response to activity, pain.  Rehab Prognosis: Good; patient would benefit from acute skilled PT services to address these deficits and reach maximum level of function.      Goals:   Multidisciplinary Problems       Physical Therapy Goals          Problem: " Physical Therapy    Goal Priority Disciplines Outcome Interventions   Physical Therapy Goal     PT, PT/OT Progressing    Description: Goals to be met by: 2025     Patient will increase functional independence with mobility by performin. Supine to sit with Supervision  2. Sit to stand transfer with Supervision  3. Bed to chair transfer with Supervision using No Assistive Device  4. Gait  x 500 feet with Supervision using No Assistive Device.   5. Ascend/descend 4 stairs with R HR with Stand by Assistance and No Assistive Device.   6. Stand for 6 minutes with Supervision using No Assistive Device                         Subjective     RN notified prior to session.  present upon PT entrance into room. Patient agreeable to PT evaluation.    Chief Complaint: chest discomfort feeling like anxiety    Patient/Family Comments/goals: to get better and go home  Pain/Comfort:  Pain Rating 1: 6/10  Location - Side 1: Bilateral  Location - Orientation 1: generalized  Location 1: chest (Pt reports anxiety feeling in chest)  Pain Addressed 1: Nurse notified, Reposition, Distraction  Pain Rating Post-Intervention 1:  (not rated)    Social History:  Residence: Patient lives with their spouse in a single story house with 4 CHEYAN[address] and ramp entry in back of house. Pt's bathroom has a WIS with grab bars and built in bench.   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   Managing Medicines/Managing Home: yes.   Hobbies: painting  Assistance Upon Discharge: significant other    Objective:     Additional staff present: OT; OT for co-evaluation due to suspected patient need for two skilled therapists to appropriately assess patient's functional deficits as well as ensure patient safety, accommodate for limited activity tolerance, and provide  "appropriate, skilled assistance to maximize functional potential during evaluation.     Patient found up in chair with: arterial line, telemetry, pulse ox (continuous), blood pressure cuff, peripheral IV     General Precautions: Standard, Cardiac fall   Orthopedic Precautions:N/A   Braces: N/A   Body mass index is 27.04 kg/m².  Oxygen Device: Room Air  Vitals: BP (!) 117/59   Pulse 69   Temp 98.8 °F (37.1 °C) (Oral)   Resp 20   Ht 5' 6" (1.676 m)   Wt 76 kg (167 lb 8.8 oz)   SpO2 100%   BMI 27.04 kg/m²     Exams:  Cognition:   Oriented X 4 and Alert   Patient is oriented to Person, Place, Time, Situation  Command following: Follows multistep verbal commands  Fluency: clear/fluent  Hearing: Intact  Vision:  Intact  Skin Integrity: Visible skin intact  Postural Assessment: forward head  Physical Exam:    Left UE Left LE Right UE Right LE   Edema absent absent absent absent   ROM AROM WFL AROM WFL AROM WFL AROM WFL   Strength within normal limits within normal limits within normal limits within normal limits   Sensation intact to light touch intact to light touch intact to light touch intact to light touch   Coordination not tested not tested not tested not tested     Outcome Measures:  AM-PAC 6 CLICK MOBILITY  Turning over in bed (including adjusting bedclothes, sheets and blankets)?: 4  Sitting down on and standing up from a chair with arms (e.g., wheelchair, bedside commode, etc.): 3  Moving from lying on back to sitting on the side of the bed?: 3  Moving to and from a bed to a chair (including a wheelchair)?: 3  Need to walk in hospital room?: 3  Climbing 3-5 steps with a railing?: 3  Basic Mobility Total Score: 19     Functional Mobility:    Bed Mobility:   Pt found/returned to bedside chair    Transfers:   Sit <> Stand Transfer: contact guard assistance with hand-held assist. [id number] from bedside chair    Standing Balance:  Static Standing Balance: Good- : able to maintain standing balance against minimal " resistance  Dynamic Standing Balance: Good- : able to stand independently unsupported and weight shift across midline minimally  Assistance Level Required: Stand-by Assistance  Patient used: no assistive device   Time: ~2 min   Comments: Pt able to perform standing dynamic tasks at sink with SBA and UL UE support with no LOB.       Gait:   Patient ambulated: 18 ft in room + standing ADLs + 200 ft in hallway   Patient required: contact guard assistance  Patient used:  hand-held assist   Gait Pattern observed: reciprocal gait  Gait Deviation(s): occasional unsteady gait, decreased step length, narrow base of support, decreased weight shift, decreased aj, decreased arm swing, and shuffle gait  Impairments due to: decreased strength and decreased endurance  Portable monitor utilized  all lines remained intact throughout ambulation trial  Comments: Patient required cues for sequencing, step through gait pattern, stride length, and upright posture to increase independence and safety. Patient required cues ~ 50% of the time. Pt with some unsteadiness requiring UL HHA.     Education:  Time provided for education, counseling and discussion of health disposition in regards to patient's current status  All questions answered within PT scope of practice and to patient's satisfaction  PT role in POC to address current functional deficits  Pt educated on proper body mechanics, safety techniques, and energy conservation with PT facilitation and cueing throughout session  Call nursing/pct to transfer to chair/use bathroom. Pt stated understanding.  Whiteboard updated with therapist name and pt's current mobility status documented above  Safe to perform step transfer to/from chair/bedside commode one person assist and HHA w/ nursing/PCT present  Pt to ambulate 1-2/day HHA and one person assist with nsg/  in order to maintain functional mobility  Importance of OOB tolerance >4 hrs/day to improve lung ventilation and expansion as  well as strengthen postural musculature      DME Justifications:  No DME recommended requiring DME justifications    Patient left up in chair with all lines intact, call button in reach, and RN notified.    History:     Past Medical History:   Diagnosis Date    Essential (primary) hypertension     Headache        Past Surgical History:   Procedure Laterality Date    APPENDECTOMY      BREAST LUMPECTOMY      right     SECTION      COLONOSCOPY      CYSTOSCOPY      HYSTERECTOMY      pain- MI/BSO    PELVIC LAPAROSCOPY      REPAIR, ANEURYSM, AORTA, ENDOVASCULAR N/A 2025    Procedure: REPAIR, ANEURYSM, AORTA, ENDOVASCULAR;  Surgeon: Chata Pena MD;  Location: [facility] OR [address] [place] [address];  Service: Vascular;  Laterality: N/A;  mGy  1363.54   Gycm2 93.9449   Flouro time 12.8 min  Contrast amount 62ml    TONSILLECTOMY         Family History   Problem Relation Name Age of Onset    Breast cancer Neg Hx      Colon cancer Neg Hx      Ovarian cancer Neg Hx         Social History     Socioeconomic History    Marital status:    Tobacco Use    Smoking status: Former     Current packs/day: 0.00     Types: Cigarettes     Quit date: 2018     Years since quittin.4    Smokeless tobacco: Never   Substance and Sexual Activity    Alcohol use: Never    Drug use: Never    Sexual activity: Yes     Partners: Male     Birth control/protection: See Surgical Hx     Comment:      Social Drivers of Health     Financial Resource Strain: Low Risk  (2025)    Overall Financial Resource Strain (CARDIA)     Difficulty of Paying Living Expenses: Not hard at all   Food Insecurity: No Food Insecurity (2025)    Hunger Vital Sign     Worried About Running Out of Food in the Last Year: Never true     Ran Out of Food in the Last Year: Never true   Transportation Needs: No Transportation Needs (2025)    PRAPARE - Transportation     Lack of Transportation (Medical): No     Lack of Transportation (Non-Medical): No    Stress: No Stress Concern Present (7/12/2025)    [employer] [place] [employer] - Occupational Stress Questionnaire     Feeling of Stress : Not at all   Housing Stability: Low Risk  (7/12/2025)    Housing Stability Vital Sign     Unable to Pay for Housing in the Last Year: No     Homeless in the Last Year: No       Time Tracking:     PT Received On: 07/15/25  PT Start Time: 0914     PT Stop Time: 0937  PT Total Time (min): 23 min     Billable Minutes: Evaluation 8 min and Gait Training 15 min    7/15/2025

## 2025-07-15 NOTE — PLAN OF CARE
Problem: Occupational Therapy  Goal: Occupational Therapy Goal  Description: Goals to be met by: 7 days 7/22/25     Patient will increase functional independence with ADLs by performing:    Pt to complete standing g/h skills with supervision.   Pt to complete toileting with supervision.  Pt to complete t/f bed, chair and commode with Supervision.  Pt to complete UE dressing with set-up  Pt to complete LE dressing with SBA    Outcome: Progressing

## 2025-07-15 NOTE — ASSESSMENT & PLAN NOTE
60F with likely incidental discovery of short segment paravisceral aortic dissection w/o malperfusion with concomitant massive gastric distention, antral and duodenal inflammation in patient with long standing GI/enteral complaints and new foregut symptoms now s/p PMEG 7/14    - progress diet to consistent carb   - MMPC  - Bowel regimen  - MAP > 65  - ASA + plavix  - DVT Ppx  - PPI  - IS  - PT/OT eval and treat

## 2025-07-15 NOTE — PROGRESS NOTES
[facility]  Critical Care - Surgery  Progress Note    Patient Name: Shelbie Santamaria  MRN: [id number]  Admission Date: 7/7/2025  Hospital Length of Stay: 8 days  Code Status: Full Code  Attending Provider: Chata Pena MD  Primary Care Provider: Sunny Alexandre NP   Principal Problem: Aortic dissection    Subjective:     Hospital/ICU Course:  The patient presents to the CTVICU s/p EVAR with Dr. Pena on 07/14/2025. She is on room air and is in stable condition. Distal pulses palpable in all four extremities. On 7/15, deemed stable for stepdown from the ICU.     Interval History/Significant Events:     NAEON. Pain well controlled with tylenol. On RA. Sitting in chair. Pulses palpable x4.     Follow-up For: Procedure(s) (LRB):  REPAIR, ANEURYSM, AORTA, ENDOVASCULAR (N/A)    Post-Operative Day: 1 Day Post-Op    Objective:     Vital Signs (Most Recent):  Temp: 98 °F (36.7 °C) (07/14/25 2300)  Pulse: 70 (07/15/25 0600)  Resp: (!) 22 (07/15/25 0600)  BP: (!) 125/58 (07/15/25 0600)  SpO2: 99 % (07/15/25 0600) Vital Signs (24h Range):  Temp:  [97.7 °F (36.5 °C)-99 °F (37.2 °C)] 98 °F (36.7 °C)  Pulse:  [] 70  Resp:  [15-50] 22  SpO2:  [96 %-100 %] 99 %  BP: ()/(55-84) 125/58  Arterial Line BP: (103-168)/(49-72) 132/62     Weight: 76 kg (167 lb 8.8 oz)  Body mass index is 27.04 kg/m².      Intake/Output Summary (Last 24 hours) at 7/15/2025 0649  Last data filed at 7/15/2025 0600  Gross per 24 hour   Intake 1999.66 ml   Output 2820 ml   Net -820.34 ml          Physical Exam  Constitutional:       Appearance: Normal appearance.   Cardiovascular:      Rate and Rhythm: Normal rate and regular rhythm.      Pulses: Normal pulses.   Pulmonary:      Effort: Pulmonary effort is normal.   Abdominal:      General: Abdomen is flat.      Palpations: Abdomen is soft.   Skin:     General: Skin is warm and dry.   Neurological:      General: No focal deficit present.      Mental Status: She  is alert and oriented to person, place, and time.   Psychiatric:         Mood and Affect: Mood normal.         Behavior: Behavior normal.            Vents:       Lines/Drains/Airways       Drain  Duration                  Urethral Catheter 07/14/25 1134 Straight-tip 16 Fr. <1 day              Arterial Line  Duration             Arterial Line 07/14/25 1117 Left Radial <1 day              Peripheral Intravenous Line  Duration             Peripheral IV 07/12/25 0648 20 G Long PIVC Anterior;Right Forearm 3 days    Peripheral IV Single Lumen 07/11/25 1600 20 G 1 3/4 in Anterior;Left Forearm 3 days                    Significant Labs:    CBC/Anemia Profile:  Recent Labs   Lab 07/14/25  0259 07/14/25  1144 07/14/25  1338 07/15/25  0313   WBC 10.44  --  12.72* 11.83   HGB 11.5*  --  11.0* 10.1*   HCT 33.9* 26* 32.7* 30.9*     --  302 307   MCV 87  --  87 88   RDW 13.8  --  13.7 13.9        Chemistries:  Recent Labs   Lab 07/14/25 0259 07/14/25 1338 07/15/25  0313    138 139   K 3.4* 3.9 4.1    109 112*   CO2 20* 19* 21*   BUN 9 7* 7*   CREATININE 0.6 0.6 0.6   CALCIUM 9.2 8.6* 8.9   ALBUMIN 3.0* 3.1* 3.0*   PROT 5.8* 6.0 5.9*   BILITOT 0.5 0.7 0.5   ALKPHOS 31* 37* 31*   ALT 25 30 39   AST 38 45 49*   MG 1.8 2.1 1.9   PHOS 3.1 2.8 2.8       All pertinent labs within the past 24 hours have been reviewed.    Significant Imaging:  I have reviewed all pertinent imaging results/findings within the past 24 hours.  Assessment/Plan:     Neuro  Post-operative pain  -scheduled tylenol  -oxy 5mg PRN    Cardiac/Vascular  * Aortic dissection  S/p EVAR on 7/14  -lay flat until 1800  -asa 81mg  -plavix 75mg  -SBP <160      HTN (hypertension)  -Amlodipine 10 mg daily  -Lopressor 150 mg BID    HLD (hyperlipidemia)  -Atorvastatin 40 mg daily  -Ezetemibe 10 mg nightly  -Fenofibrate 160 mg daily         Critical Care Daily Checklist:    A: Awake: RASS Goal/Actual Goal:    Actual:     B: Spontaneous Breathing Trial  Performed?     C: SAT & SBT Coordinated?  N/a                      D: Delirium: CAM-ICU     E: Early Mobility Performed? Yes   F: Feeding Goal #1 PO intake will meet greater than or equal to 75% of estimated needs by RD follow up  Goal status #1: new  Goal #2: Maintain weight throughout hospitalization  Goal status #2: new   Current Diet Order   Procedures    Diet Consistent Carbohydrate 2000 Calories (up to 75 gm per meal); Standard Tray     Total calories / carbs::   2000 Calories (up to 75 gm per meal)     Tray type::   Standard Tray      AS: Analgesia/Sedation tylenol   T: Thromboembolic Prophylaxis DAPT   H: HOB > 300 Yes   U: Stress Ulcer Prophylaxis (if needed) PPI   G: Glucose Control    B: Bowel Function Unmeasured Stool Occurrence: 14   I: Indwelling Catheter (Lines & Tom) Necessity Tom out today   D: De-escalation of Antimicrobials/Pharmacotherapies N/a    Plan for the day/ETD Stable to leave ICU    Code Status:  Family/Goals of Care: Full Code            Critical care was time spent personally by me on the following activities: development of treatment plan with patient or surrogate and bedside caregivers, discussions with consultants, evaluation of patient's response to treatment, examination of patient, ordering and performing treatments and interventions, ordering and review of laboratory studies, ordering and review of radiographic studies, pulse oximetry, re-evaluation of patient's condition.  This critical care time did not overlap with that of any other provider or involve time for any procedures.     Prema Tom MD  Critical Care - Surgery  [facility]

## 2025-07-15 NOTE — PROGRESS NOTES
[facility]  Vascular Surgery  Progress Note    Patient Name: Shelbie Santamaria  MRN: [id number]  Admission Date: 7/7/2025  Primary Care Provider: Sunny Alexandre NP    Subjective:     Interval History: POD 1 NAEON afebrile and hemodynamically stable. Patient has no complaints this am and states she feels so much better. Bialteral groin sties without swelling or drainage. Motor strength and sensation intact. D/c rosa today. Encouraged ambulation and working with PT and OT. Encouraged IS with RT and nursing. OOB for all meals and patient should ambulate. Patient can stepdown today.     Post-Op Info:  Procedure(s) (LRB):  REPAIR, ANEURYSM, AORTA, ENDOVASCULAR (N/A)   1 Day Post-Op     Medications:  Continuous Infusions:  Scheduled Meds:   acetaminophen  1,000 mg Oral Q8H    amLODIPine  10 mg Oral Daily    aspirin  81 mg Oral Daily    atorvastatin  40 mg Oral Daily    clopidogreL  75 mg Oral Daily    DULoxetine  60 mg Oral Daily    ezetimibe  10 mg Oral Nightly    fenofibrate  160 mg Oral Daily    metoprolol tartrate  150 mg Oral BID    mupirocin   Nasal BID    pantoprazole  40 mg Intravenous Daily     PRN Meds:  Current Facility-Administered Medications:     calcium gluconate IVPB, 1 g, Intravenous, PRN    calcium gluconate IVPB, 2 g, Intravenous, PRN    calcium gluconate IVPB, 3 g, Intravenous, PRN    dicyclomine, 10 mg, Oral, QID PRN    hydrALAZINE, 20 mg, Intravenous, Q4H PRN    labetalol, 10 mg, Intravenous, Q2H PRN    magnesium sulfate 2 g IVPB, 2 g, Intravenous, PRN    magnesium sulfate 2 g IVPB, 2 g, Intravenous, PRN    metoclopramide, 10 mg, Intravenous, Q6H PRN    ondansetron, 4 mg, Intravenous, Q6H PRN    oxyCODONE, 5 mg, Oral, Q6H PRN    polyethylene glycol, 17 g, Oral, Daily PRN    potassium chloride, 40 mEq, Intravenous, PRN **AND** potassium chloride, 60 mEq, Intravenous, PRN **AND** potassium chloride, 80 mEq, Intravenous, PRN    senna-docusate, 1 tablet, Oral, Daily PRN     sodium chloride 0.9%, 10 mL, Intravenous, PRN    sodium phosphate 15 mmol in D5W 250 mL IVPB, 15 mmol, Intravenous, PRN    sodium phosphate 20.1 mmol in D5W 250 mL IVPB, 20.1 mmol, Intravenous, PRN    sodium phosphate 30 mmol in D5W 250 mL IVPB, 30 mmol, Intravenous, PRN     Objective:     Vital Signs (Most Recent):  Temp: 98 °F (36.7 °C) (07/14/25 2300)  Pulse: 70 (07/15/25 0600)  Resp: (!) 22 (07/15/25 0600)  BP: (!) 125/58 (07/15/25 0600)  SpO2: 99 % (07/15/25 0600) Vital Signs (24h Range):  Temp:  [97.7 °F (36.5 °C)-98.5 °F (36.9 °C)] 98 °F (36.7 °C)  Pulse:  [] 70  Resp:  [15-50] 22  SpO2:  [96 %-100 %] 99 %  BP: ()/(55-76) 125/58  Arterial Line BP: (103-168)/(49-72) 132/62          Physical Exam  Vitals reviewed.   Constitutional:       General: She is awake.      Appearance: Normal appearance.      Comments: On RA   HENT:      Head: Normocephalic.      Nose: Nose normal.      Mouth/Throat:      Mouth: Mucous membranes are moist.   Eyes:      Pupils: Pupils are equal, round, and reactive to light.   Cardiovascular:      Rate and Rhythm: Normal rate and regular rhythm.      Pulses: Normal pulses.   Pulmonary:      Effort: Pulmonary effort is normal. No respiratory distress.   Abdominal:      General: Abdomen is flat.   Genitourinary:     Comments: Tom   Musculoskeletal:      Cervical back: Normal range of motion.   Skin:     General: Skin is warm and dry.      Capillary Refill: Capillary refill takes less than 2 seconds.      Coloration: Skin is not jaundiced.      Comments: Bilateral groin site dsg CDI no swelling no drainage   Neurological:      General: No focal deficit present.      Mental Status: She is alert.      Sensory: Sensation is intact.      Motor: Motor function is intact.   Psychiatric:         Attention and Perception: Attention and perception normal.         Mood and Affect: Mood normal.         Behavior: Behavior normal. Behavior is cooperative.          Significant Labs:  CBC:    Recent Labs   Lab 07/15/25  0313   WBC 11.83   RBC 3.53*   HGB 10.1*   HCT 30.9*      MCV 88   MCH 28.6   MCHC 32.7     CMP:   Recent Labs   Lab 07/15/25  0313      CALCIUM 8.9   ALBUMIN 3.0*   PROT 5.9*      K 4.1   CO2 21*   *   BUN 7*   CREATININE 0.6   ALKPHOS 31*   ALT 39   AST 49*   BILITOT 0.5     All pertinent labs from the last 24 hours have been reviewed.    Significant Diagnostics:  I have reviewed all pertinent imaging results/findings within the past 24 hours.  Assessment/Plan:     * Aortic dissection  60F with likely incidental discovery of short segment paravisceral aortic dissection w/o malperfusion with concomitant massive gastric distention, antral and duodenal inflammation in patient with long standing GI/enteral complaints and new foregut symptoms now s/p PMEG 7/14    - progress diet to consistent carb   - MMPC  - Bowel regimen  - MAP > 65  - ASA + plavix  - DVT Ppx  - PPI  - IS  - PT/OT eval and treat        Duodenitis  - GI consulted and appreciate recommendations    HTN (hypertension)  - amlodipine   - metoprolol      HLD (hyperlipidemia)  - statin        Appreciate CVICU assistance in managing patient.     Rajni Younger, CNS  Vascular Surgery  [facility]

## 2025-07-15 NOTE — PLAN OF CARE
07/15/25 1400   Discharge Assessment   Assessment Type Discharge Planning Reassessment   Confirmed/corrected address, phone number and insurance Yes   Confirmed Demographics Correct on Facesheet   Source of Information patient   Communicated ANNABELLE with patient/caregiver Yes   People in Home child(nory), adult;significant other;grandchild(nory)   Name(s) of People in Home Jed Santamaria   Do you expect to return to your current living situation? Yes   Do you have help at home or someone to help you manage your care at home? Yes   Who are your caregiver(s) and their phone number(s)? Jed   Prior to hospitilization cognitive status: Alert/Oriented;No Deficits   Current cognitive status: Alert/Oriented   Home Accessibility not wheelchair accessible   Home Layout Able to live on 1st floor   Equipment Currently Used at Home bedside commode;walker, rolling;wheelchair   Readmission within 30 days? No   Patient currently being followed by outpatient case management? No   Do you currently have service(s) that help you manage your care at home? No   Do you take prescription medications? Yes   Do you have prescription coverage? Yes   Do you have any problems affording any of your prescribed medications? No   Is the patient taking medications as prescribed? yes   How do you get to doctors appointments? car, drives self   Are you on dialysis? No   Do you take coumadin? No   Discharge Plan A Home with family   Discharge Plan B Home;Home Health   DME Needed Upon Discharge  none   Transition of Care Barriers None     Aman VILLEDA  Discharge Reassessment    Primary Care Provider: Sunny Alexandre NP    Expected Discharge Date: 7/16/2025    Reassessment (most recent)       Discharge Reassessment - 07/10/25 1355          Discharge Reassessment    Assessment Type Discharge Planning Reassessment     Did the patient's condition or plan change since previous assessment? No     Discharge Plan discussed with: Spouse/sig other      Communicated ANNABELLE with patient/caregiver Yes     Discharge Plan A Home with family                   Patient lives with spouse, son, daughter in law, and grandson in a single story home.  Patient does not feel she will need any post acute services upon hospital discharge.     Discharge Plan A and Plan B have been determined by review of patient's clinical status, future medical and therapeutic needs, and coverage/benefits for post-acute care in coordination with multidisciplinary team members.     Juanita Velasco RN, BSN  Case Management  [phone]

## 2025-07-15 NOTE — HOSPITAL COURSE
The patient presents to the CTVICU s/p EVAR with Dr. Pena on 07/14/2025. She is on room air and is in stable condition. Distal pulses palpable in all four extremities. On 7/15, deemed stable for stepdown from the ICU.

## 2025-07-15 NOTE — SUBJECTIVE & OBJECTIVE
Interval History/Significant Events:     NAEON. Pain well controlled with tylenol. On RA. Sitting in chair. Pulses palpable x4.     Follow-up For: Procedure(s) (LRB):  REPAIR, ANEURYSM, AORTA, ENDOVASCULAR (N/A)    Post-Operative Day: 1 Day Post-Op    Objective:     Vital Signs (Most Recent):  Temp: 98 °F (36.7 °C) (07/14/25 2300)  Pulse: 70 (07/15/25 0600)  Resp: (!) 22 (07/15/25 0600)  BP: (!) 125/58 (07/15/25 0600)  SpO2: 99 % (07/15/25 0600) Vital Signs (24h Range):  Temp:  [97.7 °F (36.5 °C)-99 °F (37.2 °C)] 98 °F (36.7 °C)  Pulse:  [] 70  Resp:  [15-50] 22  SpO2:  [96 %-100 %] 99 %  BP: ()/(55-84) 125/58  Arterial Line BP: (103-168)/(49-72) 132/62     Weight: 76 kg (167 lb 8.8 oz)  Body mass index is 27.04 kg/m².      Intake/Output Summary (Last 24 hours) at 7/15/2025 0649  Last data filed at 7/15/2025 0600  Gross per 24 hour   Intake 1999.66 ml   Output 2820 ml   Net -820.34 ml          Physical Exam  Constitutional:       Appearance: Normal appearance.   Cardiovascular:      Rate and Rhythm: Normal rate and regular rhythm.      Pulses: Normal pulses.   Pulmonary:      Effort: Pulmonary effort is normal.   Abdominal:      General: Abdomen is flat.      Palpations: Abdomen is soft.   Skin:     General: Skin is warm and dry.   Neurological:      General: No focal deficit present.      Mental Status: She is alert and oriented to person, place, and time.   Psychiatric:         Mood and Affect: Mood normal.         Behavior: Behavior normal.            Vents:       Lines/Drains/Airways       Drain  Duration                  Urethral Catheter 07/14/25 1134 Straight-tip 16 Fr. <1 day              Arterial Line  Duration             Arterial Line 07/14/25 1117 Left Radial <1 day              Peripheral Intravenous Line  Duration             Peripheral IV 07/12/25 0648 20 G Long PIVC Anterior;Right Forearm 3 days    Peripheral IV Single Lumen 07/11/25 1600 20 G 1 3/4 in Anterior;Left Forearm 3 days                     Significant Labs:    CBC/Anemia Profile:  Recent Labs   Lab 07/14/25  0259 07/14/25  1144 07/14/25  1338 07/15/25  0313   WBC 10.44  --  12.72* 11.83   HGB 11.5*  --  11.0* 10.1*   HCT 33.9* 26* 32.7* 30.9*     --  302 307   MCV 87  --  87 88   RDW 13.8  --  13.7 13.9        Chemistries:  Recent Labs   Lab 07/14/25 0259 07/14/25 1338 07/15/25  0313    138 139   K 3.4* 3.9 4.1    109 112*   CO2 20* 19* 21*   BUN 9 7* 7*   CREATININE 0.6 0.6 0.6   CALCIUM 9.2 8.6* 8.9   ALBUMIN 3.0* 3.1* 3.0*   PROT 5.8* 6.0 5.9*   BILITOT 0.5 0.7 0.5   ALKPHOS 31* 37* 31*   ALT 25 30 39   AST 38 45 49*   MG 1.8 2.1 1.9   PHOS 3.1 2.8 2.8       All pertinent labs within the past 24 hours have been reviewed.    Significant Imaging:  I have reviewed all pertinent imaging results/findings within the past 24 hours.

## 2025-07-15 NOTE — PLAN OF CARE
Problem: Physical Therapy  Goal: Physical Therapy Goal  Description: Goals to be met by: 2025     Patient will increase functional independence with mobility by performin. Supine to sit with Supervision  2. Sit to stand transfer with Supervision  3. Bed to chair transfer with Supervision using No Assistive Device  4. Gait  x 500 feet with Supervision using No Assistive Device.   5. Ascend/descend 4 stairs with R HR with Stand by Assistance and No Assistive Device.   6. Stand for 6 minutes with Supervision using No Assistive Device    Outcome: Progressing     Evaluated and established PT POC.    Cyndie Whitman, PT  7/15/2025

## 2025-07-15 NOTE — EICU
CRISTAL Night Rounds Checklist  24H Vital Sign Range:  Temp:  [97.7 °F (36.5 °C)-99 °F (37.2 °C)]   Pulse:  []   Resp:  [15-50]   BP: ()/(55-84)   SpO2:  [96 %-100 %]   Arterial Line BP: (103-168)/(49-72)     Video rounds

## 2025-07-15 NOTE — PLAN OF CARE
Patient transferred to [facility] for continued medical management.     CM/SW has been discussing discharge plan    Discharge Plan A: Home with family  Discharge Plan B: Home, Home Health    with patient and Extended Emergency Contact Information  Primary Emergency Contact: RosalioJed  Address: [address] [place] [address] of [place]  Mobile Phone: [phone]  Relation: Significant other.     Juanita Velasco RN, BSN  Case Management  [phone]

## 2025-07-16 VITALS
RESPIRATION RATE: 16 BRPM | DIASTOLIC BLOOD PRESSURE: 67 MMHG | SYSTOLIC BLOOD PRESSURE: 101 MMHG | WEIGHT: 167.56 LBS | HEART RATE: 79 BPM | HEIGHT: 66 IN | BODY MASS INDEX: 26.93 KG/M2 | OXYGEN SATURATION: 100 % | TEMPERATURE: 98 F

## 2025-07-16 DIAGNOSIS — I71.02 DISSECTION OF ABDOMINAL AORTA: Primary | ICD-10-CM

## 2025-07-16 PROBLEM — G89.18 POST-OPERATIVE PAIN: Status: RESOLVED | Noted: 2025-07-14 | Resolved: 2025-07-16

## 2025-07-16 PROBLEM — K29.80 DUODENITIS: Status: RESOLVED | Noted: 2025-07-07 | Resolved: 2025-07-16

## 2025-07-16 PROBLEM — E88.09 HYPOALBUMINEMIA DUE TO PROTEIN-CALORIE MALNUTRITION: Status: ACTIVE | Noted: 2025-07-16

## 2025-07-16 PROBLEM — R11.2 NAUSEA & VOMITING: Status: RESOLVED | Noted: 2025-07-08 | Resolved: 2025-07-16

## 2025-07-16 PROBLEM — E46 HYPOALBUMINEMIA DUE TO PROTEIN-CALORIE MALNUTRITION: Status: ACTIVE | Noted: 2025-07-16

## 2025-07-16 PROBLEM — R33.9 URINARY RETENTION: Status: RESOLVED | Noted: 2025-07-10 | Resolved: 2025-07-16

## 2025-07-16 LAB
ABSOLUTE EOSINOPHIL (OHS): 0.06 K/UL
ABSOLUTE MONOCYTE (OHS): 1.11 K/UL (ref 0.3–1)
ABSOLUTE NEUTROPHIL COUNT (OHS): 7.97 K/UL (ref 1.8–7.7)
ALBUMIN SERPL BCP-MCNC: 3.2 G/DL (ref 3.5–5.2)
ALP SERPL-CCNC: 34 UNIT/L (ref 40–150)
ALT SERPL W/O P-5'-P-CCNC: 55 UNIT/L (ref 10–44)
ANION GAP (OHS): 7 MMOL/L (ref 8–16)
AST SERPL-CCNC: 56 UNIT/L (ref 11–45)
BASOPHILS # BLD AUTO: 0.02 K/UL
BASOPHILS NFR BLD AUTO: 0.1 %
BILIRUB SERPL-MCNC: 0.4 MG/DL (ref 0.1–1)
BUN SERPL-MCNC: 8 MG/DL (ref 8–23)
CALCIUM SERPL-MCNC: 9.3 MG/DL (ref 8.7–10.5)
CHLORIDE SERPL-SCNC: 113 MMOL/L (ref 95–110)
CO2 SERPL-SCNC: 23 MMOL/L (ref 23–29)
CREAT SERPL-MCNC: 0.6 MG/DL (ref 0.5–1.4)
ERYTHROCYTE [DISTWIDTH] IN BLOOD BY AUTOMATED COUNT: 14.2 % (ref 11.5–14.5)
GFR SERPLBLD CREATININE-BSD FMLA CKD-EPI: >60 ML/MIN/1.73/M2
GLUCOSE SERPL-MCNC: 84 MG/DL (ref 70–110)
HCT VFR BLD AUTO: 33.4 % (ref 37–48.5)
HGB BLD-MCNC: 10.9 GM/DL (ref 12–16)
IMM GRANULOCYTES # BLD AUTO: 0.04 K/UL (ref 0–0.04)
IMM GRANULOCYTES NFR BLD AUTO: 0.3 % (ref 0–0.5)
LYMPHOCYTES # BLD AUTO: 4.29 K/UL (ref 1–4.8)
MAGNESIUM SERPL-MCNC: 1.9 MG/DL (ref 1.6–2.6)
MCH RBC QN AUTO: 28.9 PG (ref 27–31)
MCHC RBC AUTO-ENTMCNC: 32.6 G/DL (ref 32–36)
MCV RBC AUTO: 89 FL (ref 82–98)
NUCLEATED RBC (/100WBC) (OHS): 0 /100 WBC
PHOSPHATE SERPL-MCNC: 3 MG/DL (ref 2.7–4.5)
PLATELET # BLD AUTO: 338 K/UL (ref 150–450)
PMV BLD AUTO: 10.7 FL (ref 9.2–12.9)
POTASSIUM SERPL-SCNC: 3.4 MMOL/L (ref 3.5–5.1)
PROT SERPL-MCNC: 6.1 GM/DL (ref 6–8.4)
RBC # BLD AUTO: 3.77 M/UL (ref 4–5.4)
RELATIVE EOSINOPHIL (OHS): 0.4 %
RELATIVE LYMPHOCYTE (OHS): 31.8 % (ref 18–48)
RELATIVE MONOCYTE (OHS): 8.2 % (ref 4–15)
RELATIVE NEUTROPHIL (OHS): 59.2 % (ref 38–73)
SODIUM SERPL-SCNC: 143 MMOL/L (ref 136–145)
WBC # BLD AUTO: 13.49 K/UL (ref 3.9–12.7)

## 2025-07-16 PROCEDURE — 25000242 PHARM REV CODE 250 ALT 637 W/ HCPCS

## 2025-07-16 PROCEDURE — 25000003 PHARM REV CODE 250

## 2025-07-16 PROCEDURE — 85025 COMPLETE CBC W/AUTO DIFF WBC: CPT

## 2025-07-16 PROCEDURE — 84100 ASSAY OF PHOSPHORUS: CPT

## 2025-07-16 PROCEDURE — 36415 COLL VENOUS BLD VENIPUNCTURE: CPT

## 2025-07-16 PROCEDURE — 80053 COMPREHEN METABOLIC PANEL: CPT

## 2025-07-16 PROCEDURE — 83735 ASSAY OF MAGNESIUM: CPT

## 2025-07-16 PROCEDURE — 25000003 PHARM REV CODE 250: Performed by: STUDENT IN AN ORGANIZED HEALTH CARE EDUCATION/TRAINING PROGRAM

## 2025-07-16 RX ORDER — OXYCODONE HYDROCHLORIDE 5 MG/1
5 TABLET ORAL EVERY 6 HOURS PRN
Qty: 20 TABLET | Refills: 0 | Status: SHIPPED | OUTPATIENT
Start: 2025-07-16

## 2025-07-16 RX ORDER — CLOPIDOGREL BISULFATE 75 MG/1
75 TABLET ORAL DAILY
Qty: 30 TABLET | Refills: 11 | Status: SHIPPED | OUTPATIENT
Start: 2025-07-16 | End: 2026-07-16

## 2025-07-16 RX ORDER — ACETAMINOPHEN 500 MG
1000 TABLET ORAL EVERY 8 HOURS
COMMUNITY
Start: 2025-07-16 | End: 2025-07-19

## 2025-07-16 RX ORDER — AMOXICILLIN 250 MG
1 CAPSULE ORAL DAILY PRN
Qty: 14 TABLET | Refills: 0 | Status: SHIPPED | OUTPATIENT
Start: 2025-07-16

## 2025-07-16 RX ORDER — ATORVASTATIN CALCIUM 40 MG/1
40 TABLET, FILM COATED ORAL DAILY
Qty: 90 TABLET | Refills: 3 | Status: SHIPPED | OUTPATIENT
Start: 2025-07-16 | End: 2026-07-16

## 2025-07-16 RX ORDER — AMLODIPINE BESYLATE 10 MG/1
10 TABLET ORAL DAILY
Qty: 90 TABLET | Refills: 3 | Status: SHIPPED | OUTPATIENT
Start: 2025-07-16 | End: 2026-07-16

## 2025-07-16 RX ORDER — POLYETHYLENE GLYCOL 3350 17 G/17G
17 POWDER, FOR SOLUTION ORAL DAILY
Qty: 510 G | Refills: 0 | Status: SHIPPED | OUTPATIENT
Start: 2025-07-16

## 2025-07-16 RX ADMIN — MUPIROCIN: 20 OINTMENT TOPICAL at 09:07

## 2025-07-16 RX ADMIN — CLOPIDOGREL 75 MG: 75 TABLET ORAL at 09:07

## 2025-07-16 RX ADMIN — FENOFIBRATE 160 MG: 160 TABLET ORAL at 09:07

## 2025-07-16 RX ADMIN — METOPROLOL TARTRATE 50 MG: 50 TABLET, FILM COATED ORAL at 09:07

## 2025-07-16 RX ADMIN — AMLODIPINE BESYLATE 10 MG: 10 TABLET ORAL at 09:07

## 2025-07-16 RX ADMIN — ATORVASTATIN CALCIUM 40 MG: 40 TABLET, FILM COATED ORAL at 09:07

## 2025-07-16 RX ADMIN — DULOXETINE 60 MG: 60 CAPSULE, DELAYED RELEASE ORAL at 09:07

## 2025-07-16 RX ADMIN — ASPIRIN 81 MG CHEWABLE TABLET 81 MG: 81 TABLET CHEWABLE at 09:07

## 2025-07-16 NOTE — PLAN OF CARE
[facility]  Discharge Final Note    Primary Care Provider: Sunny Alexandre NP  Expected Discharge Date: 7/16/2025    Patient medically ready for discharge to home.     Transportation by family.     Is family/patient aware of discharge: yes     Hospital follow up scheduled: yes       Final Discharge Note (most recent)       Final Note - 07/16/25 1433          Final Note    Assessment Type Final Discharge Note     Anticipated Discharge Disposition Home or Self Care     Hospital Resources/Appts/Education Provided Provided patient/caregiver with written discharge plan information                     Important Message from Medicare         Referral Info (most recent)       Referral Info    No documentation.                 Contact Info       Chata Pena MD   Specialty: Vascular Surgery    [address]  [facility] [address]   Phone: [phone]       Next Steps: Follow up in 4 week(s)          Future Appointments   Date Time Provider Department Center   8/21/2025 11:00 AM [facility] OIC-CT2 500 LB LIMIT [facility] CTS [facility] Imaging Ctr   8/21/2025  2:00 PM Chata Pena MD [facility] VASCounts include 234 beds at the [facility]     Salvatore Fitzpatrick RN  Case Management  Ext: [phone]  07/16/2025

## 2025-07-16 NOTE — CARE UPDATE
Unit [facility]ESS[facility] Care Support Interaction      I have reviewed the chart of Shelbie Santamaria who is hospitalized for Aortic dissection. The patient is currently located in the following unit: [facility]         I have seen and examined the patient and provided the following support:     Patient experience rounds - positive experience reported       Carmen Martinez, MAGUIP-C  Unit Based JESSICA

## 2025-07-16 NOTE — CARE UPDATE
I have reviewed the chart of Shelbie Bianchi Rosalio who is hospitalized for the following:    Active Hospital Problems    Diagnosis    *Aortic dissection    BMI 27.0-27.9,adult    Hypoalbuminemia due to protein-calorie malnutrition    HLD (hyperlipidemia)    HTN (hypertension)        ZACHARY Forte-SEBASTIAN  Unit Based JESSICA

## 2025-07-16 NOTE — NURSING
"[facility] Plan of Care Note    Dx:   Aortic dissection [I71.00]    Shift Events: Neurovascular checks pulses palpable 2+, incision site CDI no hematoma.Ambulates in the hallway with .    Goals of Care: OOB, safety    Neuro: AOX4    Vital Signs: /62   Pulse 62   Temp 98.1 °F (36.7 °C) (Oral)   Resp 18   Ht 5' 6" (1.676 m)   Wt 76 kg (167 lb 8.8 oz)   SpO2 99%   BMI 27.04 kg/m²     Respiratory: RA    Diet: Diet Consistent Carbohydrate 2000 Calories (up to 75 gm per meal); Standard Tray      Is patient tolerating current diet? Yes    GTTS: None    Urine Output/Bowel Movement:   No intake/output data recorded.  Last Bowel Movement: 07/14/25      Drains/Tubes/Tube Feeds (include total output/shift):   No intake/output data recorded.      Lines: PIV x2      Accuchecks: None    Skin: bilateral femoral incision    Fall Risk Score: see flowsheets    Activity level? Independent    Any scheduled procedures?  None    Any safety concerns? Fall Risk    Other:   "

## 2025-07-16 NOTE — NURSING
[facility] Plan of Care Note  Dx Final diagnoses:  [I71.00] Aortic dissection     Shift Events No acute events, stepdown from SICU. Bilateral groin sites CDI. Neurovasc BLE checks q4hr, pulses palpable.     Neuro: AAOx4    Vital Signs: VSS    Respiratory: RA    Diet: Regular    Urine Output/Bowel Movement: adequate urine output, passing flatus

## 2025-07-16 NOTE — DISCHARGE SUMMARY
[facility]  Vascular Surgery  Discharge Summary      Patient Name: Shelbie Santamaria  MRN: [id number]  Admission Date: 7/7/2025  Hospital Length of Stay: 9 days  Discharge Date and Time: 07/16/2025 9:22 AM  Attending Physician: Chata Pena MD   Discharging Provider: JACQUES Marquez  Primary Care Provider: Sunny Alexandre NP     HPI: This patient is a 60-year-old female past medical history significant for hypertension, hyperlipidemia, endometriosis presenting with 10 days of general malaise, anorexia, and chills.  She went out to lunch today and after trying to eat, had an exacerbation of these ongoing symptoms prompting her presentation to an outside hospital.  Enhanced imaging was obtained which demonstrated an aortic dissection in her paravisceral segment, chronicity unknown.  She was transferred to [facility] for further evaluation.  At bedside, she endorses a longstanding history of foregut and GI illnesses.  Her chief complaint is nausea and epigastric pain.  She is maintained on aspirin, Zetia.  She is a former smoker. Denies back pain, chest pain, dyspnea. Denies sick contacts.     Procedure(s) (LRB):  REPAIR, ANEURYSM, AORTA, ENDOVASCULAR (N/A)     Hospital Course:    SHELBIE SANTAMARIA 60 y.o.female was transferred stat from [place] for vascular surgery for management of aortic dissection. Patient was transferred to the MICU where she was stabilized on esmolol and cardene gtt for impulse control. She also had NGT placed for gastric distension with relief; however, her abdominal pain reoccurred with nausea. She received CT imaging for abdominal pain and evaluation of aortic dissection. She received further evaluation with GI consult for abdominal pain to rule out any other causes due to atypical presentation. She received scheduled bowel regimen, enemas, and bentyl prn as well as successful NGT clamp trial. She was agreeable to undergoing endovascular  aortic aneurysm repair.  She underwent: Procedure(s) (LRB):  REPAIR, ANEURYSM, AORTA, ENDOVASCULAR (N/A) on 7/14/2025. The patient tolerated the procedure well, was transferred to recovery post-op in the CVICU, and then transferred to the [facility] for continuation of medical care. The patient's clinical condition progressively improved. By the time of discharge, she was meeting all post op2 discharge milestones, tolerating a diet without nausea or vomiting, pain was well controlled with oral medications, voiding appropriately and she was ambulating without difficulty. On POD 2 the patient was discharged to home. On discharge, the patient's incisions were c/d/i and the surgical site was soft and appropriately tender to palpation. The patient was discharged on Aspirin and Plavix and will follow up in Dr. Pena's clinic in 4 weeks with CTA C/A/P with delays. Discussed POC and ED precautions with patient. Patient verbalized understanding and is agreeable to plan. All questions answered.    Please see hospital and op notes for further detail regarding patient's admission.    Patient's discharge was discussed with Dr. Pena.       Consults (From admission, onward)          Status Ordering Provider     Inpatient consult to Gastroenterology  Once        Provider:  (Not yet assigned)    Completed ROOSEVELT ADAMS     Inpatient consult to Critical Care Medicine  Once        Provider:  (Not yet assigned)    Completed NESS TAN              Indwelling Lines/Drains at time of discharge:   Lines/Drains/Airways       None                   Significant Diagnostic Studies: Labs: BMP:   Recent Labs   Lab 07/14/25  1338 07/15/25  0313 07/16/25  0307   GLU 98 107 84    139 143   K 3.9 4.1 3.4*    112* 113*   CO2 19* 21* 23   BUN 7* 7* 8   CREATININE 0.6 0.6 0.6   CALCIUM 8.6* 8.9 9.3   MG 2.1 1.9 1.9   , CMP   Recent Labs   Lab 07/14/25  1338 07/15/25  0313 07/16/25  0307    139 143   K 3.9 4.1 3.4*   CL  "109 112* 113*   CO2 19* 21* 23   GLU 98 107 84   BUN 7* 7* 8   CREATININE 0.6 0.6 0.6   CALCIUM 8.6* 8.9 9.3   PROT 6.0 5.9* 6.1   ALBUMIN 3.1* 3.0* 3.2*   BILITOT 0.7 0.5 0.4   ALKPHOS 37* 31* 34*   AST 45 49* 56*   ALT 30 39 55*   ANIONGAP 10 6* 7*   , CBC   Recent Labs   Lab 07/14/25  1338 07/15/25  0313 07/16/25  0307   WBC 12.72* 11.83 13.49*   HGB 11.0* 10.1* 10.9*   HCT 32.7* 30.9* 33.4*    307 338   , A1C: No results for input(s): "HGBA1C" in the last 4320 hours., and All labs within the past 24 hours have been reviewed    Pending Diagnostic Studies:       None            Final Active Diagnoses:    Diagnosis Date Noted POA    PRINCIPAL PROBLEM:  Aortic dissection [I71.00] 07/07/2025 Yes    HLD (hyperlipidemia) [E78.5] 01/24/2020 Yes    HTN (hypertension) [I10] 01/24/2020 Yes      Problems Resolved During this Admission:    Diagnosis Date Noted Date Resolved POA    Post-operative pain [G89.18] 07/14/2025 07/16/2025 No    Urinary retention [R33.9] 07/10/2025 07/16/2025 No    Abdominal pain [R10.9] 07/09/2025 07/15/2025 Yes    Nausea & vomiting [R11.2] 07/08/2025 07/16/2025 Yes    Hypokalemia [E87.6] 07/08/2025 07/15/2025 Yes    Duodenitis [K29.80] 07/07/2025 07/16/2025 Yes        Physical Exam  Vitals reviewed.   Vitals:    07/16/25 0800   BP: 101/67   Pulse: 79   Resp: 16   Temp: 98.3 °F (36.8 °C)       Constitutional:       General: She is awake.      Appearance: Normal appearance.      Comments: On RA   HENT:      Head: Normocephalic.      Nose: Nose normal.      Mouth/Throat:      Mouth: Mucous membranes are moist.   Eyes:      Pupils: Pupils are equal, round, and reactive to light.   Cardiovascular:      Rate and Rhythm: Normal rate and regular rhythm.      Pulses: Normal pulses.   Pulmonary:      Effort: Pulmonary effort is normal. No respiratory distress.   Abdominal:      General: Abdomen is flat.   Genitourinary:     Comments: voiding without difficulty   Musculoskeletal:      Cervical back: " Normal range of motion.   Skin:     General: Skin is warm and dry.      Capillary Refill: Capillary refill takes less than 2 seconds.      Coloration: Skin is not jaundiced.      Comments: Bilateral groin site dsg CDI no swelling no drainage   Neurological:      General: No focal deficit present.      Mental Status: She is alert.      Sensory: Sensation is intact.      Motor: Motor function is intact.   Psychiatric:         Attention and Perception: Attention and perception normal.         Mood and Affect: Mood normal.         Behavior: Behavior normal. Behavior is cooperative.       Discharged Condition: good    Disposition: Home or Self Care    Follow Up:   Follow-up Information       Chata Pena MD Follow up in 4 week(s).    Specialty: Vascular Surgery  Contact information:  [address]  [facility] [address] [phone]                             Patient Instructions:   VASCULAR SURGERY DISCHARGE INSTRUCTIONS    Woundcare:  - Shower daily and pad your groin puncture site dry. Do not scrub hard over your incisions  - It is normal to notice some bruising at your groin site in the first 1-3 days after surgery    Activity:  - Walking is good for you. Try to walk frequently and increase walking distance every day  - No heavy lifting (anything >10 lbs or = to a gallon of milk) or strenuous exercise until cleared by physician. No  pushing or pulling activities such as vacuuming or raking. Otherwise, activity as tolerated.  - No baths, swimming in pools, lakes, [employer] etc. for 4 weeks     MEDICATIONS AND PAIN CONTROL  -- Please take any newly prescribed medications.  -- Most people find that over-the-counter pain medications (Tylenol) will be sufficient for most pain control.  -- You have been prescribed a narcotic pain medication. Please wean narcotic over the next few days. If you're taking  prescription narcotics, do not drive or operate heavy machinery. Do not drive if your pain is not controlled  enough for  you to react quickly safely.  -- No straining, avoid constipation. May take OTC stool softeners as described and laxatives as needed.    Diet:  -Resume your pre-operative home diet    Follow up:  -Follow up for CTA and vascular surgery clinic appointment in ~4 weeks    Call Vascular Surgery Office at [phone] if you experience:  -Increased redness, warmth, tenderness, or draining pus from your groin  -Increased pain/swelling at your groin  -Worsening fevers, chills, nausea/vomiting  -Pain, weakness, coldness, or numbness in your legs  -Uncontrolled pain  -Your call will be returned within 24 hours and further instructions will be provided    Go to ER/Urgent Care if you experience:  -Worsening shortness of breath or chest pain  -Sudden severe pain and swelling at your groin site         Diet general     Call MD for:  temperature >100.4     Call MD for:  persistent nausea and vomiting     Call MD for:  severe uncontrolled pain     Call MD for:  difficulty breathing, headache or visual disturbances     Call MD for:  redness, tenderness, or signs of infection (pain, swelling, redness, odor or green/yellow discharge around incision site)     Call MD for:  hives     Call MD for:  persistent dizziness or light-headedness     Call MD for:     Call MD for:  extreme fatigue     Lifting restrictions     Activity as tolerated       Medications:  Reconciled Home Medications:      Medication List        START taking these medications      acetaminophen 500 MG tablet  Commonly known as: TYLENOL  Take 2 tablets (1,000 mg total) by mouth every 8 (eight) hours. for 3 days     amLODIPine 10 MG tablet  Commonly known as: NORVASC  Take 1 tablet (10 mg total) by mouth once daily.     atorvastatin 40 MG tablet  Commonly known as: LIPITOR  Take 1 tablet (40 mg total) by mouth once daily.     clopidogreL 75 mg tablet  Commonly known as: PLAVIX  Take 1 tablet (75 mg total) by mouth once daily.     oxyCODONE 5 MG immediate  release tablet  Commonly known as: ROXICODONE  Take 1 tablet (5 mg total) by mouth every 6 (six) hours as needed for Pain.     polyethylene glycol 17 gram Pwpk  Commonly known as: GLYCOLAX  Take 17 g by mouth once daily.     senna-docusate 8.6-50 mg per tablet  Commonly known as: PERICOLACE  Take 1 tablet by mouth daily as needed for Constipation.            CONTINUE taking these medications      aspirin 81 MG EC tablet  Commonly known as: ECOTRIN  Take 81 mg by mouth once daily.     cetirizine 10 MG tablet  Commonly known as: ZYRTEC  Take 10 mg by mouth once daily.     cholecalciferol (vitamin D3) 125 mcg (5,000 unit) Tab  Take 5,000 Units by mouth 2 (two) times daily.     clobetasol 0.05% 0.05 % Oint  Commonly known as: TEMOVATE  Apply topically 2 (two) times daily.     DULoxetine 60 MG capsule  Commonly known as: CYMBALTA  Take 1 capsule (60 mg total) by mouth once daily.     estradioL 0.01 % (0.1 mg/gram) vaginal cream  Commonly known as: ESTRACE  Place 1 g vaginally twice a week.     eszopiclone 3 mg Tab  Commonly known as: LUNESTA  Take 3 mg by mouth every evening.     ezetimibe 10 mg tablet  Commonly known as: ZETIA  Take 10 mg by mouth nightly.     fenofibrate 160 MG Tab  Take 160 mg by mouth once daily.     fish oil-omega-3 fatty acids 300-1,000 mg capsule  Take 1 capsule by mouth 2 (two) times daily.     furosemide 40 MG tablet  Commonly known as: LASIX  furosemide 40 mg tablet   TAKE 1 TABLET BY MOUTH EVERY DAY IN THE MORNING.     methocarbamoL 750 MG Tab  Commonly known as: ROBAXIN  Take 1 tablet (750 mg total) by mouth 3 (three) times daily as needed (leg cramping).     metoprolol tartrate 50 MG tablet  Commonly known as: LOPRESSOR  Take 50 mg by mouth 2 (two) times daily.     potassium chloride SA 10 MEQ tablet  Commonly known as: K-DUR,KLOR-CON M  Take 10 mEq by mouth once daily.              JACQUES Marquez           Patient was seen and examined on the date of discharge and determined to be  suitable for discharge.  Total time spent preparing discharge services: 15 minutes.  Time was spent speaking with consultants and case management, reviewing records, and/or discussing the plan of care with patient/family.    Rajni Younger, MSN, APRN, ACCNS-AG, CCRN-K  Clinical Nurse Specialist  Vascular Surgery  [facility]  [address] [place] [address]

## 2025-07-16 NOTE — PLAN OF CARE
Problem: Adult Inpatient Plan of Care  Goal: Plan of Care Review  Outcome: Progressing  Goal: Patient-Specific Goal (Individualized)  Outcome: Progressing  Goal: Absence of Hospital-Acquired Illness or Injury  Outcome: Progressing  Goal: Optimal Comfort and Wellbeing  Outcome: Progressing     Problem: Wound  Goal: Optimal Functional Ability  Outcome: Progressing  Goal: Optimal Pain Control and Function  Outcome: Progressing     Problem: Fall Injury Risk  Goal: Absence of Fall and Fall-Related Injury  Outcome: Progressing

## 2025-07-16 NOTE — DISCHARGE INSTRUCTIONS
VASCULAR SURGERY DISCHARGE INSTRUCTIONS    Woundcare:  - Shower daily and pad your groin puncture site dry. Do not scrub hard over your incisions  - It is normal to notice some bruising at your groin site in the first 1-3 days after surgery    Activity:  - Walking is good for you. Try to walk frequently and increase walking distance every day  - No heavy lifting (anything >10 lbs or = to a gallon of milk) or strenuous exercise until cleared by physician. No  pushing or pulling activities such as vacuuming or raking. Otherwise, activity as tolerated.  - No baths, swimming in pools, [employer], [employer] etc. for 4 weeks     MEDICATIONS AND PAIN CONTROL  -- Please take any newly prescribed medications.  -- Most people find that over-the-counter pain medications (Tylenol) will be sufficient for most pain control.  -- You have been prescribed a narcotic pain medication. Please wean narcotic over the next few days. If you're taking  prescription narcotics, do not drive or operate heavy machinery. Do not drive if your pain is not controlled enough for  you to react quickly safely.  -- No straining, avoid constipation. May take OTC stool softeners as described and laxatives as needed.    Diet:  -Resume your pre-operative home diet    Follow up:  -Follow up for CTA and vascular surgery clinic appointment in ~4 weeks    Call Vascular Surgery Office at [phone] if you experience:  -Increased redness, warmth, tenderness, or draining pus from your groin  -Increased pain/swelling at your groin  -Worsening fevers, chills, nausea/vomiting  -Pain, weakness, coldness, or numbness in your legs  -Uncontrolled pain  -Your call will be returned within 24 hours and further instructions will be provided    Go to ER/Urgent Care if you experience:  -Worsening shortness of breath or chest pain  -Sudden severe pain and swelling at your groin site

## 2025-07-16 NOTE — PROGRESS NOTES
Vascular Surgery Note    POD#2 s/p endovascular juxtarenal abdominal aortic aneurysm repair.  Denied post-prandial or otherwise abdominal pain.  Tolerated macaroni and cheese and pulled pork yesterday.  Abdomen soft, ND, NTTP, without palpable pulsatile mass appreciated; bilateral groin access sites were without swelling or drainage with preserved distal perfusion.  Ambulating independently.  Requesting to be discharged home.  Dual antiplatelet and statin therapy recommended.  All of Ms. Santamaria's questions were answered.

## 2025-07-17 NOTE — OP NOTE
[facility]  Vascular Surgery  Operative Note    SUMMARY     Date of Procedure: 7/14/2025     Procedure: Procedure(s) (LRB):  REPAIR, ANEURYSM, AORTA, ENDOVASCULAR (N/A)     Surgeons and Role:     * Chata Pena MD - Primary     * Rory Garcia MD - Fellow     * Adebayo Camargo MD - Fellow     * Dayton Saenz MD - Co-Surgeon: Dr. Saenz's present as a co-surgeon was necessary for this case given the complex endovascular repair and his expertise in physician modified endografts    Assisting Surgeon: None    Pre-Operative Diagnosis: Aortic dissection [I71.00]    Post-Operative Diagnosis: Post-Op Diagnosis Codes:     * Aortic dissection [I71.00]    Anesthesia: General    Procedures Performed:    1. Ultrasound-guided bilateral common femoral artery access  2. Perclose of the bilateral common femoral arteries   3. Aortogram with mesenteric and renal angiography with selective retrograde catheterization of the superior mesenteric and bilateral renal arteries   4. Endovascular juxtarenal abdominal aortic aneurysm repair using a Cook TX2 (Left main body , physician modified with the addition of fenestrations for the superior mesenteric, left and right renal arteries as well as a partial constrained system)  5. Superior mesenteric artery bridge stent graft using a 7 x 22 atrium iCAST stent   6. Right renal artery bridge stent graft using a 5 x 22 atrium iCAST stent   7. Left renal artery bridge stent graft using a 5 x 22 atrium iCAST stent    Operative Findings (including complications, if any):  Successful repair of a juxtarenal saccular aortic aneurysm with the aforementioned physician modified endograft.  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    Description of Technical Procedures:  Ms. 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  Due to the lack of an adequate landing zone, I had recommended a physician modified endograft type repair.  Risks and benefits of the procedure have been discussed with the patient in detail prior to the procedure including but not limited to death, pain, bleeding, organ loss, need for open conversion, infection, need for further surgery.  Informed consent had been signed.      The patient was identified in the preoperative holding area.  She was brought to the operative suite and placed in the supine position.  General endotracheal anesthesia was administered as well as appropriate lines for monitoring during the case.  A time-out was called prior to the procedure confirming this was the correct patient correct procedure to be performed.  She had already been prepped and draped in the typical sterile fashion.  We began by obtaining bilateral ultrasound-guided access of the bilateral common femoral arteries with a micropuncture technique.  We then placed short 8 Georgian sheath after deployment of 2 ProGlides in each artery.  We then brought up a Glidewire advantage from the left femoral access and exchanges for a double curve Lunderquist wire in the ascending aorta via pigtail catheter.  We then fully heparinized her with 7000 units of IV heparin, and maintain an ACT greater than 250 during the entirety of the case.  We then brought of the pigtail catheter from the right femoral access and shot an aortogram.  We brought up the cook TX  device deploying it cephalad to the target vessels.  It should be noted that it had already been modified prior to the patient entering the operating room and re package.  We did pre cannulate the left renal artery which was the lowest of the 2 using a 14 [address] DrySeal sheath and a 7 Georgian aptus steerable sheath as well as a Glidewire advantage from the right femoral access.  We then cannulated the fenestrated cuff  bringing a 14 [place] DrySeal sheath into the inferior aspect of the cuff.  We then used the aptus steerable sheath and a Glidewire advantage to cannulate the superior mesenteric, right renal and left renal arteries individually leaving behind Rosario wires via an 035 glide cath exchange technique.  We then completed deployment of the fenestrated device using a Coda balloon to the proximal and distal landing zones.  Through the aptus steerable sheath we then brought up a 7 x 22 atrium iCAST stent in the superior mesenteric artery using a 7 x 22 iCAST stent, and a 5 x 22 iCAST into the right and left renal arteries.  These were all post dilated with a 9 x 2 balloon.  Completion aortogram showed a very delayed type 2 endoleak.  All wires and catheters were then removed under direct visualization.  We deployed the ProGlide preclose is that had already been placed without extravasation or hematoma.  The patient had palpable DP pulses bilaterally at the conclusion of the case.    Significant Surgical Tasks Conducted by the Assistant(s), if Applicable:  Dr. Saenz was present and scrubbed for key portions of the case.    Estimated Blood Loss (EBL): * No values recorded between 7/14/2025 11:02 AM and 7/14/2025  1:39 PM *           Implants:   Implant Name Type Inv. Item Serial No.  Lot No. LRB No. Used Action   Stent Icast   [id number]   N/A 1 Implanted   Stent iCast   [id number]   N/A 1 Implanted   Stent iCast   [id number]   N/A 1 Implanted   Dissection Endovascular Graft with Pro-Form    [employer] [id number] N/A 1 Implanted       Specimens:   Specimen (24h ago, onward)      None                    Condition: Good    Disposition: PACU - hemodynamically stable.    Attestation: I was present and scrubbed for the entire procedure.

## 2025-07-21 ENCOUNTER — TELEPHONE (OUTPATIENT)
Dept: OBSTETRICS AND GYNECOLOGY | Facility: CLINIC | Age: 60
End: 2025-07-21
Payer: COMMERCIAL

## 2025-07-21 NOTE — TELEPHONE ENCOUNTER
Pt was called and informed of normal mammogram results from 7/3/25 at [facility]. Pt voiced understanding.

## 2025-07-22 ENCOUNTER — PATIENT MESSAGE (OUTPATIENT)
Dept: VASCULAR SURGERY | Facility: CLINIC | Age: 60
End: 2025-07-22
Payer: COMMERCIAL

## 2025-08-04 ENCOUNTER — PATIENT MESSAGE (OUTPATIENT)
Dept: VASCULAR SURGERY | Facility: CLINIC | Age: 60
End: 2025-08-04
Payer: COMMERCIAL

## 2025-08-04 DIAGNOSIS — T14.8XXA HEMATOMA: Primary | ICD-10-CM

## 2025-08-06 ENCOUNTER — OFFICE VISIT (OUTPATIENT)
Dept: VASCULAR SURGERY | Facility: CLINIC | Age: 60
End: 2025-08-06
Payer: COMMERCIAL

## 2025-08-06 ENCOUNTER — HOSPITAL ENCOUNTER (OUTPATIENT)
Dept: RADIOLOGY | Facility: HOSPITAL | Age: 60
Discharge: HOME | End: 2025-08-06
Attending: STUDENT IN AN ORGANIZED HEALTH CARE EDUCATION/TRAINING PROGRAM
Payer: COMMERCIAL

## 2025-08-06 VITALS — DIASTOLIC BLOOD PRESSURE: 71 MMHG | SYSTOLIC BLOOD PRESSURE: 115 MMHG | HEART RATE: 80 BPM

## 2025-08-06 DIAGNOSIS — T14.8XXA HEMATOMA: ICD-10-CM

## 2025-08-06 DIAGNOSIS — I71.02 DISSECTION OF ABDOMINAL AORTA: Primary | ICD-10-CM

## 2025-08-06 PROCEDURE — 93971 EXTREMITY STUDY: CPT | Mod: TC,LT

## 2025-08-06 PROCEDURE — 99999 PR PBB SHADOW E&M-EST. PATIENT-LVL III: CPT | Mod: PBBFAC,,, | Performed by: STUDENT IN AN ORGANIZED HEALTH CARE EDUCATION/TRAINING PROGRAM

## 2025-08-06 PROCEDURE — 93971 EXTREMITY STUDY: CPT | Mod: 26,LT,, | Performed by: RADIOLOGY

## 2025-08-06 RX ORDER — LORATADINE 10 MG/1
10 TABLET ORAL DAILY
COMMUNITY

## 2025-08-06 NOTE — PROGRESS NOTES
VASCULAR SURGERY SERVICE    REFERRING DOCTOR: Sunny Alexandre NP    CHIEF COMPLAINT:  Abdominal aortic aneurysm    HISTORY OF PRESENT ILLNESS: Shelbie Santamaria is a 60 y.o. female with a past medical history significant for a saccular aneurysm of her infrarenal abdominal aorta that is now status post repair with a 4 vessel PMEG on 2025.  She has done very well since her surgery, her only complaint today is of a numbness in her left arm on the medial border of it.  She tells me that this happened when her IV infiltrated in the hospital.  Her strength is normal.  She has tried ice packs and pain medication without relief.  She describes a numbness and tingling pain is of her arm was waking up after going to sleep.  Otherwise she is doing well.  She is eating and drinking normally.  She denies any fevers.  She does report some chills.    Past Medical History:   Diagnosis Date    Essential (primary) hypertension     Headache        Past Surgical History:   Procedure Laterality Date    APPENDECTOMY      BREAST LUMPECTOMY      right     SECTION      COLONOSCOPY      CYSTOSCOPY      HYSTERECTOMY      pain- MI/BSO    PELVIC LAPAROSCOPY      REPAIR, ANEURYSM, AORTA, ENDOVASCULAR N/A 2025    Procedure: REPAIR, ANEURYSM, AORTA, ENDOVASCULAR;  Surgeon: Chata Pena MD;  Location: [facility] OR [address] [place] [address];  Service: Vascular;  Laterality: N/A;  mGy  1363.54   Gycm2 93.9449   Flouro time 12.8 min  Contrast amount 62ml    TONSILLECTOMY         Current Medications[1]    Review of patient's allergies indicates:   Allergen Reactions    Actifed plus      As a child    Erythromycin Other (See Comments)     unknown    Trazodone Palpitations       Family History   Problem Relation Name Age of Onset    Breast cancer Neg Hx      Colon cancer Neg Hx      Ovarian cancer Neg Hx         Social History[2]    REVIEW OF SYSTEMS:  General: No chills, fever, malaise, changes in weight  HEENT: No visual changes,  difficulty hearing  Cardiovascular: No chest pain, palpitations, claudication  Pulmonary: No dyspnea, cough, wheezing  Gastrointestinal: No nausea, vomiting, diarrhea, constipation  Genitourinary: No dysuria, low urine output, hematuria  Endocrine: No polydipsia, polyphagia  Hematologic: No fatigue with exertion, pica, pallor  Musculoskeletal: No extremity or joint pain, no back pain, no difficulty with ambulation  Neurologic: no seizures, no headaches, no weakness  Psychiatric: no mood disturbance      PHYSICAL EXAM:   /71 (BP Location: Right arm, Patient Position: Sitting)   Pulse 80   Constitutional:  Alert,   Well-appearing  In no distress.   Neurological: Normal speech  no focal findings  CN II - XII grossly intact.    Psychiatric: Mood and affect appropriate and symmetric.   HEENT: Normocephalic / atraumatic  PERRLA  Midline trachea  No scars across the neck   Cardiac: Regular rate and rhythm.   Pulmonary: Normal pulmonary effort.   Abdomen: Soft, not distended.     Skin: Warm and well perfused.    Vascular:  2+ DP bilaterally   Extremities/  Musculoskeletal: No edema.   No wounds.  Her groin wounds are very well healed.     IMAGING:  I reviewed her venous imaging independently.  She has nonocclusive thrombus in her left cephalic vein    IMPRESSION:  60-year-old female status post endovascular repair of a saccular aortic aneurysm with a 4 vessel PMEG    PLAN:   I reassured her that her arm symptoms should get better.  I suspect that this is potentially an irritation of the median antecubital vein which is a sensory nerve and in the distribution that the patient is describing.  I told her to try some warm compresses and continue with the NSAIDs and that it should get better over time.  I will see her at her regularly scheduled clinic visit with a CT scan 1 month postop.  All of her questions were answered.           [1]   Current Outpatient Medications:     amLODIPine (NORVASC) 10 MG tablet, Take 1  tablet (10 mg total) by mouth once daily., Disp: 90 tablet, Rfl: 3    aspirin (ECOTRIN) 81 MG EC tablet, Take 81 mg by mouth once daily., Disp: , Rfl:     atorvastatin (LIPITOR) 40 MG tablet, Take 1 tablet (40 mg total) by mouth once daily., Disp: 90 tablet, Rfl: 3    cholecalciferol, vitamin D3, 125 mcg (5,000 unit) Tab, Take 5,000 Units by mouth 2 (two) times daily., Disp: , Rfl:     clobetasol 0.05% (TEMOVATE) 0.05 % Oint, Apply topically 2 (two) times daily., Disp: 45 g, Rfl: 0    clopidogreL (PLAVIX) 75 mg tablet, Take 1 tablet (75 mg total) by mouth once daily., Disp: 30 tablet, Rfl: 11    DULoxetine (CYMBALTA) 60 MG capsule, Take 1 capsule (60 mg total) by mouth once daily., Disp: 90 capsule, Rfl: 1    estradioL (ESTRACE) 0.01 % (0.1 mg/gram) vaginal cream, Place 1 g vaginally twice a week., Disp: 42.5 g, Rfl: 2    eszopiclone (LUNESTA) 3 mg Tab, Take 3 mg by mouth every evening., Disp: , Rfl:     ezetimibe (ZETIA) 10 mg tablet, Take 10 mg by mouth nightly., Disp: , Rfl: 4    fenofibrate 160 MG Tab, Take 160 mg by mouth once daily., Disp: , Rfl:     fish oil-omega-3 fatty acids 300-1,000 mg capsule, Take 1 capsule by mouth 2 (two) times daily., Disp: , Rfl:     furosemide (LASIX) 40 MG tablet, furosemide 40 mg tablet  TAKE 1 TABLET BY MOUTH EVERY DAY IN THE MORNING., Disp: , Rfl:     loratadine (CLARITIN) 10 mg tablet, Take 10 mg by mouth once daily., Disp: , Rfl:     methocarbamoL (ROBAXIN) 750 MG Tab, Take 1 tablet (750 mg total) by mouth 3 (three) times daily as needed (leg cramping)., Disp: 270 tablet, Rfl: 1    metoprolol tartrate (LOPRESSOR) 50 MG tablet, Take 50 mg by mouth 2 (two) times daily. , Disp: , Rfl:     polyethylene glycol (GLYCOLAX) 17 gram/dose powder, Use cap to measure out (17 g) mix with a liquid and take by mouth once daily., Disp: 510 g, Rfl: 0    potassium chloride SA (K-DUR,KLOR-CON) 10 MEQ tablet, Take 10 mEq by mouth once daily. , Disp: , Rfl:     cetirizine (ZYRTEC) 10 MG tablet,  Take 10 mg by mouth once daily., Disp: , Rfl:     oxyCODONE (ROXICODONE) 5 MG immediate release tablet, Take 1 tablet (5 mg total) by mouth every 6 (six) hours as needed for Pain., Disp: 20 tablet, Rfl: 0    senna-docusate (PERICOLACE) 8.6-50 mg per tablet, Take 1 tablet by mouth daily as needed for Constipation., Disp: 14 tablet, Rfl: 0  [2]   Social History  Tobacco Use    Smoking status: Former     Current packs/day: 0.00     Types: Cigarettes     Quit date: 2018     Years since quittin.5    Smokeless tobacco: Never   Substance Use Topics    Alcohol use: Never    Drug use: Never

## 2025-08-29 ENCOUNTER — HOSPITAL ENCOUNTER (OUTPATIENT)
Dept: RADIOLOGY | Facility: HOSPITAL | Age: 60
Discharge: HOME | End: 2025-08-29
Attending: STUDENT IN AN ORGANIZED HEALTH CARE EDUCATION/TRAINING PROGRAM
Payer: COMMERCIAL

## 2025-08-29 ENCOUNTER — OFFICE VISIT (OUTPATIENT)
Dept: VASCULAR SURGERY | Facility: CLINIC | Age: 60
End: 2025-08-29
Payer: COMMERCIAL

## 2025-08-29 VITALS
SYSTOLIC BLOOD PRESSURE: 101 MMHG | DIASTOLIC BLOOD PRESSURE: 50 MMHG | HEART RATE: 71 BPM | WEIGHT: 178.56 LBS | BODY MASS INDEX: 28.7 KG/M2 | HEIGHT: 66 IN

## 2025-08-29 DIAGNOSIS — I71.02 DISSECTION OF ABDOMINAL AORTA: Primary | ICD-10-CM

## 2025-08-29 PROCEDURE — 99999 PR PBB SHADOW E&M-EST. PATIENT-LVL IV: CPT | Mod: PBBFAC,,, | Performed by: STUDENT IN AN ORGANIZED HEALTH CARE EDUCATION/TRAINING PROGRAM

## 2025-08-29 RX ORDER — DULOXETIN HYDROCHLORIDE 30 MG/1
30 CAPSULE, DELAYED RELEASE ORAL DAILY
COMMUNITY

## (undated) DEVICE — WIRE ROSEN .035X260

## (undated) DEVICE — SUT 4-0 12-18IN SILK BLACK

## (undated) DEVICE — SUT PERCLOSE PROSTYLE MEDIATE

## (undated) DEVICE — STOPCOCK 1 WAY PLASTIC

## (undated) DEVICE — CATH PIGTAIL

## (undated) DEVICE — SPONGE GAUZE 16PLY 4X4

## (undated) DEVICE — SUT MONOCRYL 3-0 SH U/D

## (undated) DEVICE — KIT INTRO MICRO NIT VSI 4FR

## (undated) DEVICE — Device

## (undated) DEVICE — SYR 10CC LUER LOCK

## (undated) DEVICE — VISE RADIFOCUS MULTI TORQUE

## (undated) DEVICE — COVER INSTR ELASTIC BAND 40X20

## (undated) DEVICE — SYR ONLY LUER LOCK 20CC

## (undated) DEVICE — SUT MONOCRYL 2-0 CT-1 VIL

## (undated) DEVICE — SUT SILK 2-0 SH 18IN BLACK

## (undated) DEVICE — ADHESIVE DERMABOND ADVANCED

## (undated) DEVICE — SOL NACL 0.9% IV INJ 1000ML

## (undated) DEVICE — DRAPE INCISE IOBAN 2 23X33IN

## (undated) DEVICE — CATH URETHRAL RED RUBBER 18FR

## (undated) DEVICE — BLLN CAT CODA 32MM

## (undated) DEVICE — SHEATH DRYSEAL 18FR 33CM

## (undated) DEVICE — INFLATOR ENCORE

## (undated) DEVICE — PENCIL ROCKER SWITCH 10FT CORD

## (undated) DEVICE — INTRODUCER VASC RADPQ 8FRX10CM

## (undated) DEVICE — CONTRAST FLEXCIL INJECTION

## (undated) DEVICE — SHEATH TOURGUIDE 7FR 9MM 5CM

## (undated) DEVICE — CATH ANGLED GLIDE CATH 5FR

## (undated) DEVICE — WIRE LUNDERQUIST 260

## (undated) DEVICE — APPLICATOR CHLORAPREP ORN 26ML

## (undated) DEVICE — SYR MARK 7 ARTERION 150ML

## (undated) DEVICE — SUT 2-0 12-18IN SILK

## (undated) DEVICE — TRAY CATH 1-LYR URIMTR 16FR

## (undated) DEVICE — SYR MED RAD 150ML

## (undated) DEVICE — ELECTRODE MEGADYNE RETURN DUAL

## (undated) DEVICE — CATH VALVE BALLOON 23X4

## (undated) DEVICE — CATH TORCOON NB ADV DAV

## (undated) DEVICE — GUIDEWIRE STF .035X260CM ANG

## (undated) DEVICE — STAPLER SKIN PROXIMATE WIDE

## (undated) DEVICE — SUT MCRYL PLUS 4-0 PS2 27IN

## (undated) DEVICE — SUT PROLENE 5-0 24 C-1 BL

## (undated) DEVICE — DECANTER FLUID TRNSF WHITE 9IN

## (undated) DEVICE — SUT 3-0 12-18IN SILK